# Patient Record
Sex: FEMALE | Race: BLACK OR AFRICAN AMERICAN | NOT HISPANIC OR LATINO | Employment: UNEMPLOYED | URBAN - METROPOLITAN AREA
[De-identification: names, ages, dates, MRNs, and addresses within clinical notes are randomized per-mention and may not be internally consistent; named-entity substitution may affect disease eponyms.]

---

## 2017-02-20 ENCOUNTER — COMMUNICATION - HEALTHEAST (OUTPATIENT)
Dept: CARDIOLOGY | Facility: CLINIC | Age: 47
End: 2017-02-20

## 2017-02-20 DIAGNOSIS — I10 HYPERTENSION: ICD-10-CM

## 2017-02-20 DIAGNOSIS — I10 UNSPECIFIED ESSENTIAL HYPERTENSION: ICD-10-CM

## 2017-04-11 ENCOUNTER — HOSPITAL ENCOUNTER (OUTPATIENT)
Dept: RADIOLOGY | Facility: HOSPITAL | Age: 47
Discharge: HOME | End: 2017-04-11
Attending: OBSTETRICS & GYNECOLOGY
Payer: COMMERCIAL

## 2017-04-11 DIAGNOSIS — Z12.31 VISIT FOR SCREENING MAMMOGRAM: ICD-10-CM

## 2017-04-11 PROCEDURE — 77067 SCR MAMMO BI INCL CAD: CPT | Mod: 26,,, | Performed by: RADIOLOGY

## 2017-04-11 PROCEDURE — 77067 SCR MAMMO BI INCL CAD: CPT | Mod: TC

## 2017-04-19 ENCOUNTER — COMMUNICATION - HEALTHEAST (OUTPATIENT)
Dept: CARDIOLOGY | Facility: CLINIC | Age: 47
End: 2017-04-19

## 2017-04-19 DIAGNOSIS — I10 UNSPECIFIED ESSENTIAL HYPERTENSION: ICD-10-CM

## 2017-04-19 DIAGNOSIS — I10 HYPERTENSION: ICD-10-CM

## 2017-04-26 ENCOUNTER — COMMUNICATION - HEALTHEAST (OUTPATIENT)
Dept: CARDIOLOGY | Facility: CLINIC | Age: 47
End: 2017-04-26

## 2017-05-18 ENCOUNTER — COMMUNICATION - HEALTHEAST (OUTPATIENT)
Dept: CARDIOLOGY | Facility: CLINIC | Age: 47
End: 2017-05-18

## 2017-05-18 DIAGNOSIS — I10 UNSPECIFIED ESSENTIAL HYPERTENSION: ICD-10-CM

## 2017-05-18 DIAGNOSIS — I10 HYPERTENSION: ICD-10-CM

## 2017-06-13 ENCOUNTER — COMMUNICATION - HEALTHEAST (OUTPATIENT)
Dept: CARDIOLOGY | Facility: CLINIC | Age: 47
End: 2017-06-13

## 2017-06-13 DIAGNOSIS — I10 HYPERTENSION: ICD-10-CM

## 2017-06-13 DIAGNOSIS — E87.6 HYPOKALEMIA: ICD-10-CM

## 2017-06-13 DIAGNOSIS — I10 UNSPECIFIED ESSENTIAL HYPERTENSION: ICD-10-CM

## 2017-07-25 ENCOUNTER — COMMUNICATION - HEALTHEAST (OUTPATIENT)
Dept: CARDIOLOGY | Facility: CLINIC | Age: 47
End: 2017-07-25

## 2017-07-25 DIAGNOSIS — I10 UNSPECIFIED ESSENTIAL HYPERTENSION: ICD-10-CM

## 2017-07-25 DIAGNOSIS — I10 HYPERTENSION: ICD-10-CM

## 2017-07-25 DIAGNOSIS — E87.6 HYPOKALEMIA: ICD-10-CM

## 2017-07-27 ENCOUNTER — OFFICE VISIT - HEALTHEAST (OUTPATIENT)
Dept: CARDIOLOGY | Facility: CLINIC | Age: 47
End: 2017-07-27

## 2017-07-27 DIAGNOSIS — I10 UNSPECIFIED ESSENTIAL HYPERTENSION: ICD-10-CM

## 2017-07-27 DIAGNOSIS — I10 ESSENTIAL HYPERTENSION: ICD-10-CM

## 2017-07-27 DIAGNOSIS — E87.6 HYPOKALEMIA: ICD-10-CM

## 2017-07-27 DIAGNOSIS — I10 HYPERTENSION: ICD-10-CM

## 2017-07-27 LAB
ATRIAL RATE - MUSE: 65 BPM
DIASTOLIC BLOOD PRESSURE - MUSE: NORMAL MMHG
INTERPRETATION ECG - MUSE: NORMAL
P AXIS - MUSE: 51 DEGREES
PR INTERVAL - MUSE: 166 MS
QRS DURATION - MUSE: 82 MS
QT - MUSE: 412 MS
QTC - MUSE: 428 MS
R AXIS - MUSE: 52 DEGREES
SYSTOLIC BLOOD PRESSURE - MUSE: NORMAL MMHG
T AXIS - MUSE: 41 DEGREES
VENTRICULAR RATE- MUSE: 65 BPM

## 2017-07-27 ASSESSMENT — MIFFLIN-ST. JEOR: SCORE: 1724.05

## 2018-03-27 ENCOUNTER — COMMUNICATION - HEALTHEAST (OUTPATIENT)
Dept: CARDIOLOGY | Facility: CLINIC | Age: 48
End: 2018-03-27

## 2018-03-28 DIAGNOSIS — Z12.39 SCREENING BREAST EXAMINATION: Primary | ICD-10-CM

## 2018-04-03 ENCOUNTER — OFFICE VISIT - HEALTHEAST (OUTPATIENT)
Dept: CARDIOLOGY | Facility: CLINIC | Age: 48
End: 2018-04-03

## 2018-04-03 DIAGNOSIS — I10 ESSENTIAL HYPERTENSION: ICD-10-CM

## 2018-04-03 DIAGNOSIS — E66.811 OBESITY (BMI 30.0-34.9): ICD-10-CM

## 2018-04-03 DIAGNOSIS — R07.89 OTHER CHEST PAIN: ICD-10-CM

## 2018-04-03 ASSESSMENT — MIFFLIN-ST. JEOR: SCORE: 1749.45

## 2018-04-16 ENCOUNTER — HOSPITAL ENCOUNTER (OUTPATIENT)
Dept: RADIOLOGY | Facility: HOSPITAL | Age: 48
Discharge: HOME | End: 2018-04-16
Attending: OBSTETRICS & GYNECOLOGY
Payer: COMMERCIAL

## 2018-04-16 ENCOUNTER — AMBULATORY - HEALTHEAST (OUTPATIENT)
Dept: CARDIOLOGY | Facility: CLINIC | Age: 48
End: 2018-04-16

## 2018-04-16 VITALS — BODY MASS INDEX: 25.71 KG/M2 | HEIGHT: 66 IN | WEIGHT: 160 LBS

## 2018-04-16 DIAGNOSIS — Z12.39 SCREENING BREAST EXAMINATION: ICD-10-CM

## 2018-04-16 DIAGNOSIS — R07.89 CHEST DISCOMFORT: ICD-10-CM

## 2018-04-16 DIAGNOSIS — I10 HTN (HYPERTENSION): ICD-10-CM

## 2018-04-16 PROCEDURE — 77063 BREAST TOMOSYNTHESIS BI: CPT | Mod: 26,,, | Performed by: RADIOLOGY

## 2018-04-16 PROCEDURE — 77067 SCR MAMMO BI INCL CAD: CPT | Mod: 26,,, | Performed by: RADIOLOGY

## 2018-04-16 PROCEDURE — 77067 SCR MAMMO BI INCL CAD: CPT | Mod: TC

## 2018-04-18 ENCOUNTER — COMMUNICATION - HEALTHEAST (OUTPATIENT)
Dept: ADMINISTRATIVE | Facility: CLINIC | Age: 48
End: 2018-04-18

## 2018-04-20 ENCOUNTER — COMMUNICATION - HEALTHEAST (OUTPATIENT)
Dept: CARDIOLOGY | Facility: CLINIC | Age: 48
End: 2018-04-20

## 2018-05-08 ENCOUNTER — COMMUNICATION - HEALTHEAST (OUTPATIENT)
Dept: CARDIOLOGY | Facility: CLINIC | Age: 48
End: 2018-05-08

## 2018-05-14 ENCOUNTER — COMMUNICATION - HEALTHEAST (OUTPATIENT)
Dept: CARDIOLOGY | Facility: CLINIC | Age: 48
End: 2018-05-14

## 2018-06-10 ENCOUNTER — OFFICE VISIT (OUTPATIENT)
Dept: URGENT CARE | Facility: URGENT CARE | Age: 48
End: 2018-06-10
Payer: COMMERCIAL

## 2018-06-10 VITALS
RESPIRATION RATE: 16 BRPM | WEIGHT: 220 LBS | HEIGHT: 69 IN | HEART RATE: 78 BPM | TEMPERATURE: 98.1 F | OXYGEN SATURATION: 99 % | BODY MASS INDEX: 32.58 KG/M2

## 2018-06-10 DIAGNOSIS — R07.9 ACUTE CHEST PAIN: Primary | ICD-10-CM

## 2018-06-10 DIAGNOSIS — I10 HYPERTENSION, UNSPECIFIED TYPE: ICD-10-CM

## 2018-06-10 PROCEDURE — 93000 ELECTROCARDIOGRAM COMPLETE: CPT | Performed by: PHYSICIAN ASSISTANT

## 2018-06-10 PROCEDURE — 99204 OFFICE O/P NEW MOD 45 MIN: CPT | Performed by: PHYSICIAN ASSISTANT

## 2018-06-10 RX ORDER — HYDROCHLOROTHIAZIDE 12.5 MG/1
12.5 CAPSULE ORAL DAILY
COMMUNITY
End: 2021-10-22

## 2018-06-10 NOTE — MR AVS SNAPSHOT
"              After Visit Summary   6/10/2018    Esmer Smith    MRN: [id number]           Patient Information     Date Of Birth          1970        Visit Information        Provider Department      6/10/2018 10:25 AM Tiffanie Bang PA-C [facility]        Today's Diagnoses     Acute chest pain    -  1    Hypertension, unspecified type           Follow-ups after your visit        Who to contact     If you have questions or need follow up information about today's clinic visit or your schedule please contact [facility] directly at [phone].  Normal or non-critical lab and imaging results will be communicated to you by MyChart, letter or phone within 4 business days after the clinic has received the results. If you do not hear from us within 7 days, please contact the clinic through MyChart or phone. If you have a critical or abnormal lab result, we will notify you by phone as soon as possible.  Submit refill requests through [employer] or call your pharmacy and they will forward the refill request to us. Please allow 3 business days for your refill to be completed.          Additional Information About Your Visit        Care EveryWhere ID     This is your Care EveryWhere ID. This could be used by other organizations to access your [place] medical records  [id number]        Your Vitals Were     Pulse Temperature Respirations Height Pulse Oximetry Breastfeeding?    78 98.1  F (36.7  C) (Oral) 16 5' 9\" (1.753 m) 99% No    BMI (Body Mass Index)                   32.49 kg/m2            Blood Pressure from Last 3 Encounters:   No data found for BP    Weight from Last 3 Encounters:   06/10/18 220 lb (99.8 kg)              We Performed the Following     EKG 12-lead complete w/read - Clinics        Primary Care Provider    None Specified       No primary provider on file.        Equal Access to Services     DIANE CERDA AH: Hadii navi Wynne " adriana bryantbenvirgilio jordanidalia joseph rosafred camargo. So [facility] [phone].    ATENCIÓN: Si meri bello, tiene a perez disposición servicios gratuitos de asistencia lingüística. Llame al [phone].    We comply with applicable federal civil rights laws and [place] laws. We do not discriminate on the basis of race, color, national origin, age, disability, sex, sexual orientation, or gender identity.            Thank you!     Thank you for choosing [facility]  for your care. Our goal is always to provide you with excellent care. Hearing back from our patients is one way we can continue to improve our services. Please take a few minutes to complete the written survey that you may receive in the mail after your visit with us. Thank you!             Your Updated Medication List - Protect others around you: Learn how to safely use, store and throw away your medicines at www.disposemymeds.org.          This list is accurate as of 6/10/18  1:23 PM.  Always use your most recent med list.                   Brand Name Dispense Instructions for use Diagnosis    hydrochlorothiazide 12.5 MG capsule    MICROZIDE     Take 12.5 mg by mouth daily        NORVASC PO           POTASSIUM CITRATE PO

## 2018-06-10 NOTE — PROGRESS NOTES
SUBJECTIVE:  Esmer Smith is a 47 year old female who presents to the office with the CC of chest pain.  Patient complains of right sided chest pain and right arm numbness.  The pain is characterized as severe burning located right chest with radiation to upper back. Symptoms began 2 hours prior to arrival and are worsening.   Pain is associated with diaphoresis, nausea, lightheadedness and feeling faint.  Pain is exacerbated by no known provoking events.  Pain is relieved by none.  Cardiac risk factors: negative for, abnormal lipids, diabetes mellitus, smoking  positive for HTN and FH      PAST MEDICAL HISTORY:   Past Medical History:   Diagnosis Date     Hypertension        PAST SURGICAL HISTORY:   Past Surgical History:   Procedure Laterality Date     GYN SURGERY         FAMILY HISTORY:   Family History   Problem Relation Age of Onset     CEREBROVASCULAR DISEASE Maternal Grandmother        SOCIAL HISTORY:   Social History   Substance Use Topics     Smoking status: Never Smoker     Smokeless tobacco: Never Used     Alcohol use Not on file         Current Outpatient Prescriptions:      AmLODIPine Besylate (NORVASC PO), , Disp: , Rfl:      hydrochlorothiazide (MICROZIDE) 12.5 MG capsule, Take 12.5 mg by mouth daily, Disp: , Rfl:      POTASSIUM CITRATE PO, , Disp: , Rfl:     Social History   Substance Use Topics     Smoking status: Never Smoker     Smokeless tobacco: Never Used     Alcohol use Not on file     ROS:  C: NEGATIVE for fever, chills, change in weight  INTEGUMENTARY/SKIN: NEGATIVE for worrisome rashes, moles or lesions  E/M: NEGATIVE for sore throat, ear or sinus problems  R: NEGATIVE for cough  CV: POSITIVE for CP  GI: POSITIVE for nausea  : NEGATIVE for dysuria, hematuria, decreased urinary stream or discharge  MUSCULOSKELETAL: POSITIVE for arm pain  NEURO: POSITIVE for weakness, dizziness  ENDOCRINE: NEGATIVE for cold intolerance, HX diabetes and HX thyroid disease  HEME/ALLERGY/IMMUNE: NEGATIVE for  "swollen nodes      EXAM:  Pulse 78  Temp 98.1  F (36.7  C) (Oral)  Resp 16  Ht 5' 9\" (1.753 m)  Wt 220 lb (99.8 kg)  SpO2 99%  Breastfeeding? No  BMI 32.49 kg/m2  GENERAL APPEARANCE: healthy, alert and distressed  EYES: EOMI,  PERRL, conjunctiva clear  RESP: lungs clear to auscultation - no rales, rhonchi or wheezes  CV: regular rates and rhythm, normal S1 S2, no murmur noted  ABDOMEN:  soft, nontender, no HSM or masses and bowel sounds normal  NEURO: Normal strength and tone, sensory exam grossly normal,  normal speech and mentation  SKIN: no suspicious lesions or rashes  PSYCH: mentation appears normal, anxious and crying     Office EKG demonstrates:  appears normal, NSR,normal axis, normal intervals, no acute ST/T changes c/w ischemia,no LVH by voltage criteria,unchanged from previous tracings    Assessment / PLAN:  1. Chest pain  Patient is anxious in office, crying and stating that she cant move her arms.   Unable to obtain BP, EMS called for transport to ED for evaluation.   - EKG 12-lead complete w/read - Clinics    Tiffanie Bang PA-C    "

## 2018-06-15 ENCOUNTER — HOSPITAL ENCOUNTER (OUTPATIENT)
Dept: CARDIOLOGY | Facility: CLINIC | Age: 48
Discharge: HOME | End: 2018-06-15
Attending: INTERNAL MEDICINE

## 2018-06-15 DIAGNOSIS — R07.89 CHEST DISCOMFORT: ICD-10-CM

## 2018-06-15 DIAGNOSIS — I10 HTN (HYPERTENSION): ICD-10-CM

## 2018-06-15 LAB
CV STRESS CURRENT BP HE: NORMAL
CV STRESS CURRENT HR HE: 100
CV STRESS CURRENT HR HE: 105
CV STRESS CURRENT HR HE: 106
CV STRESS CURRENT HR HE: 116
CV STRESS CURRENT HR HE: 122
CV STRESS CURRENT HR HE: 124
CV STRESS CURRENT HR HE: 125
CV STRESS CURRENT HR HE: 127
CV STRESS CURRENT HR HE: 140
CV STRESS CURRENT HR HE: 140
CV STRESS CURRENT HR HE: 147
CV STRESS CURRENT HR HE: 149
CV STRESS CURRENT HR HE: 150
CV STRESS CURRENT HR HE: 154
CV STRESS CURRENT HR HE: 163
CV STRESS CURRENT HR HE: 166
CV STRESS CURRENT HR HE: 167
CV STRESS CURRENT HR HE: 167
CV STRESS CURRENT HR HE: 75
CV STRESS CURRENT HR HE: 87
CV STRESS CURRENT HR HE: 88
CV STRESS CURRENT HR HE: 88
CV STRESS CURRENT HR HE: 92
CV STRESS CURRENT HR HE: 93
CV STRESS DEVIATION TIME HE: NORMAL
CV STRESS ECHO PERCENT HR HE: NORMAL
CV STRESS EXERCISE STAGE HE: NORMAL
CV STRESS FINAL RESTING BP HE: NORMAL
CV STRESS FINAL RESTING HR HE: 92
CV STRESS MAX HR HE: 167
CV STRESS MAX TREADMILL GRADE HE: 14
CV STRESS MAX TREADMILL SPEED HE: 3.4
CV STRESS PEAK DIA BP HE: NORMAL
CV STRESS PEAK SYS BP HE: NORMAL
CV STRESS PHASE HE: NORMAL
CV STRESS PROTOCOL HE: NORMAL
CV STRESS RESTING PT POSITION HE: NORMAL
CV STRESS RESTING PT POSITION HE: NORMAL
CV STRESS ST DEVIATION AMOUNT HE: NORMAL
CV STRESS ST DEVIATION ELEVATION HE: NORMAL
CV STRESS ST EVELATION AMOUNT HE: NORMAL
CV STRESS TEST TYPE HE: NORMAL
CV STRESS TOTAL STAGE TIME MIN 1 HE: NORMAL
ECHO EJECTION FRACTION ESTIMATED: 60 %
STRESS ECHO BASELINE BP: NORMAL
STRESS ECHO BASELINE HR: 84
STRESS ECHO CALCULATED PERCENT HR: 95 %
STRESS ECHO LAST STRESS BP: NORMAL
STRESS ECHO LAST STRESS HR: 167
STRESS ECHO POST ESTIMATED WORKLOAD: 9.3
STRESS ECHO POST EXERCISE DUR MIN: 7
STRESS ECHO POST EXERCISE DUR SEC: 43
STRESS ECHO TARGET HR: 149

## 2018-06-20 ENCOUNTER — OFFICE VISIT - HEALTHEAST (OUTPATIENT)
Dept: CARDIOLOGY | Facility: CLINIC | Age: 48
End: 2018-06-20

## 2018-06-20 DIAGNOSIS — I10 ESSENTIAL HYPERTENSION: ICD-10-CM

## 2018-06-20 ASSESSMENT — MIFFLIN-ST. JEOR: SCORE: 1746.73

## 2018-09-10 ENCOUNTER — COMMUNICATION - HEALTHEAST (OUTPATIENT)
Dept: CARDIOLOGY | Facility: CLINIC | Age: 48
End: 2018-09-10

## 2018-09-18 ENCOUNTER — COMMUNICATION - HEALTHEAST (OUTPATIENT)
Dept: CARDIOLOGY | Facility: CLINIC | Age: 48
End: 2018-09-18

## 2018-09-18 DIAGNOSIS — I10 ESSENTIAL HYPERTENSION: ICD-10-CM

## 2018-09-18 DIAGNOSIS — E87.6 HYPOKALEMIA: ICD-10-CM

## 2018-09-25 ENCOUNTER — RECORDS - HEALTHEAST (OUTPATIENT)
Dept: LAB | Facility: CLINIC | Age: 48
End: 2018-09-25

## 2018-09-25 LAB
ANION GAP SERPL CALCULATED.3IONS-SCNC: 5 MMOL/L (ref 5–18)
BUN SERPL-MCNC: 9 MG/DL (ref 8–22)
CALCIUM SERPL-MCNC: 9 MG/DL (ref 8.5–10.5)
CHLORIDE BLD-SCNC: 108 MMOL/L (ref 98–107)
CHOLEST SERPL-MCNC: 193 MG/DL
CO2 SERPL-SCNC: 26 MMOL/L (ref 22–31)
CREAT SERPL-MCNC: 0.67 MG/DL (ref 0.6–1.1)
FASTING STATUS PATIENT QL REPORTED: YES
FERRITIN SERPL-MCNC: 50 NG/ML (ref 10–130)
GFR SERPL CREATININE-BSD FRML MDRD: >60 ML/MIN/1.73M2
GLUCOSE BLD-MCNC: 95 MG/DL (ref 70–125)
HDLC SERPL-MCNC: 53 MG/DL
LDLC SERPL CALC-MCNC: 129 MG/DL
MAGNESIUM SERPL-MCNC: 2 MG/DL (ref 1.8–2.6)
POTASSIUM BLD-SCNC: 3.9 MMOL/L (ref 3.5–5)
SODIUM SERPL-SCNC: 139 MMOL/L (ref 136–145)
TRIGL SERPL-MCNC: 54 MG/DL
TSH SERPL DL<=0.005 MIU/L-ACNC: 3.31 UIU/ML (ref 0.3–5)

## 2019-04-01 DIAGNOSIS — Z12.31 VISIT FOR SCREENING MAMMOGRAM: Primary | ICD-10-CM

## 2019-04-23 ENCOUNTER — HOSPITAL ENCOUNTER (OUTPATIENT)
Dept: RADIOLOGY | Facility: HOSPITAL | Age: 49
Discharge: HOME | End: 2019-04-23
Attending: OBSTETRICS & GYNECOLOGY
Payer: COMMERCIAL

## 2019-04-23 VITALS — HEIGHT: 66 IN | WEIGHT: 160 LBS | BODY MASS INDEX: 25.71 KG/M2

## 2019-04-23 DIAGNOSIS — Z12.31 VISIT FOR SCREENING MAMMOGRAM: ICD-10-CM

## 2019-04-23 PROCEDURE — 77063 BREAST TOMOSYNTHESIS BI: CPT | Mod: 26,,, | Performed by: RADIOLOGY

## 2019-04-23 PROCEDURE — 77067 MAMMO DIGITAL SCREENING BILAT WITH TOMOSYNTHESIS_CAD: ICD-10-PCS | Mod: 26,,, | Performed by: RADIOLOGY

## 2019-04-23 PROCEDURE — 77063 MAMMO DIGITAL SCREENING BILAT WITH TOMOSYNTHESIS_CAD: ICD-10-PCS | Mod: 26,,, | Performed by: RADIOLOGY

## 2019-04-23 PROCEDURE — 77067 SCR MAMMO BI INCL CAD: CPT | Mod: 26,,, | Performed by: RADIOLOGY

## 2019-04-23 PROCEDURE — 77067 SCR MAMMO BI INCL CAD: CPT | Mod: TC

## 2019-06-15 ENCOUNTER — COMMUNICATION - HEALTHEAST (OUTPATIENT)
Dept: CARDIOLOGY | Facility: CLINIC | Age: 49
End: 2019-06-15

## 2019-06-15 DIAGNOSIS — E87.6 HYPOKALEMIA: ICD-10-CM

## 2019-06-15 DIAGNOSIS — I10 ESSENTIAL HYPERTENSION: ICD-10-CM

## 2019-06-28 ENCOUNTER — RECORDS - HEALTHEAST (OUTPATIENT)
Dept: LAB | Facility: CLINIC | Age: 49
End: 2019-06-28

## 2019-06-28 LAB — TSH SERPL DL<=0.005 MIU/L-ACNC: 3.61 UIU/ML (ref 0.3–5)

## 2019-07-02 LAB — THYROID PEROXIDASE ANTIBODIES - HISTORICAL: 148.1 IU/ML (ref 0–5.6)

## 2019-07-03 ENCOUNTER — COMMUNICATION - HEALTHEAST (OUTPATIENT)
Dept: CARDIOLOGY | Facility: CLINIC | Age: 49
End: 2019-07-03

## 2019-07-03 DIAGNOSIS — I10 ESSENTIAL HYPERTENSION: ICD-10-CM

## 2019-07-16 ENCOUNTER — RECORDS - HEALTHEAST (OUTPATIENT)
Dept: ADMINISTRATIVE | Facility: OTHER | Age: 49
End: 2019-07-16

## 2019-07-16 ENCOUNTER — AMBULATORY - HEALTHEAST (OUTPATIENT)
Dept: CARDIOLOGY | Facility: CLINIC | Age: 49
End: 2019-07-16

## 2019-07-17 ENCOUNTER — AMBULATORY - HEALTHEAST (OUTPATIENT)
Dept: CARDIOLOGY | Facility: CLINIC | Age: 49
End: 2019-07-17

## 2019-07-26 ENCOUNTER — OFFICE VISIT - HEALTHEAST (OUTPATIENT)
Dept: CARDIOLOGY | Facility: CLINIC | Age: 49
End: 2019-07-26

## 2019-07-26 DIAGNOSIS — I10 ESSENTIAL HYPERTENSION: ICD-10-CM

## 2019-07-26 DIAGNOSIS — R07.89 ATYPICAL CHEST PAIN: ICD-10-CM

## 2019-07-26 ASSESSMENT — MIFFLIN-ST. JEOR: SCORE: 1784.81

## 2019-12-30 ENCOUNTER — COMMUNICATION - HEALTHEAST (OUTPATIENT)
Dept: CARDIOLOGY | Facility: CLINIC | Age: 49
End: 2019-12-30

## 2019-12-30 DIAGNOSIS — I10 ESSENTIAL HYPERTENSION: ICD-10-CM

## 2019-12-31 ENCOUNTER — COMMUNICATION - HEALTHEAST (OUTPATIENT)
Dept: CARDIOLOGY | Facility: CLINIC | Age: 49
End: 2019-12-31

## 2019-12-31 DIAGNOSIS — E87.6 HYPOKALEMIA: ICD-10-CM

## 2020-03-02 ENCOUNTER — COMMUNICATION - HEALTHEAST (OUTPATIENT)
Dept: CARDIOLOGY | Facility: CLINIC | Age: 50
End: 2020-03-02

## 2020-03-02 DIAGNOSIS — I10 ESSENTIAL HYPERTENSION: ICD-10-CM

## 2020-04-08 ENCOUNTER — COMMUNICATION - HEALTHEAST (OUTPATIENT)
Dept: CARDIOLOGY | Facility: CLINIC | Age: 50
End: 2020-04-08

## 2020-04-15 ENCOUNTER — OFFICE VISIT - HEALTHEAST (OUTPATIENT)
Dept: CARDIOLOGY | Facility: CLINIC | Age: 50
End: 2020-04-15

## 2020-04-15 DIAGNOSIS — I10 ESSENTIAL HYPERTENSION: ICD-10-CM

## 2020-04-15 DIAGNOSIS — R07.89 OTHER CHEST PAIN: ICD-10-CM

## 2020-04-15 DIAGNOSIS — I10 HTN (HYPERTENSION): ICD-10-CM

## 2020-04-16 ENCOUNTER — COMMUNICATION - HEALTHEAST (OUTPATIENT)
Dept: CARDIOLOGY | Facility: CLINIC | Age: 50
End: 2020-04-16

## 2020-06-19 ENCOUNTER — COMMUNICATION - HEALTHEAST (OUTPATIENT)
Dept: CARDIOLOGY | Facility: CLINIC | Age: 50
End: 2020-06-19

## 2020-06-19 DIAGNOSIS — I10 ESSENTIAL HYPERTENSION: ICD-10-CM

## 2020-06-19 DIAGNOSIS — E87.6 HYPOKALEMIA: ICD-10-CM

## 2020-06-23 ENCOUNTER — COMMUNICATION - HEALTHEAST (OUTPATIENT)
Dept: CARDIOLOGY | Facility: CLINIC | Age: 50
End: 2020-06-23

## 2020-06-23 DIAGNOSIS — I10 ESSENTIAL HYPERTENSION: ICD-10-CM

## 2020-12-01 ENCOUNTER — OFFICE VISIT (OUTPATIENT)
Dept: INTERNAL MEDICINE | Facility: CLINIC | Age: 50
End: 2020-12-01
Payer: COMMERCIAL

## 2020-12-01 VITALS
OXYGEN SATURATION: 99 % | DIASTOLIC BLOOD PRESSURE: 70 MMHG | HEIGHT: 66 IN | WEIGHT: 180.44 LBS | HEART RATE: 73 BPM | BODY MASS INDEX: 29 KG/M2 | SYSTOLIC BLOOD PRESSURE: 132 MMHG

## 2020-12-01 DIAGNOSIS — Z00.00 ROUTINE GENERAL MEDICAL EXAMINATION AT A HEALTH CARE FACILITY: ICD-10-CM

## 2020-12-01 DIAGNOSIS — I51.7 LVH (LEFT VENTRICULAR HYPERTROPHY): ICD-10-CM

## 2020-12-01 DIAGNOSIS — I10 ESSENTIAL HYPERTENSION: Primary | ICD-10-CM

## 2020-12-01 LAB
BILIRUB SERPL-MCNC: ABNORMAL MG/DL
BLOOD URINE, POC: ABNORMAL
CLARITY, POC UA: ABNORMAL
COLOR, POC UA: ABNORMAL
GLUCOSE UR QL STRIP: ABNORMAL
KETONES UR QL STRIP: ABNORMAL
LEUKOCYTE ESTERASE URINE, POC: ABNORMAL
NITRITE, POC UA: ABNORMAL
PH, POC UA: ABNORMAL
PROTEIN, POC: ABNORMAL
SPECIFIC GRAVITY, POC UA: ABNORMAL
UROBILINOGEN, POC UA: ABNORMAL

## 2020-12-01 PROCEDURE — 90471 FLU VACCINE (QUAD) GREATER THAN OR EQUAL TO 3YO PRESERVATIVE FREE IM: ICD-10-PCS | Mod: S$GLB,,, | Performed by: INTERNAL MEDICINE

## 2020-12-01 PROCEDURE — 90471 IMMUNIZATION ADMIN: CPT | Mod: S$GLB,,, | Performed by: INTERNAL MEDICINE

## 2020-12-01 PROCEDURE — 93010 EKG 12-LEAD: ICD-10-PCS | Mod: S$GLB,,, | Performed by: INTERNAL MEDICINE

## 2020-12-01 PROCEDURE — 99204 OFFICE O/P NEW MOD 45 MIN: CPT | Mod: 25,S$GLB,, | Performed by: INTERNAL MEDICINE

## 2020-12-01 PROCEDURE — 3008F BODY MASS INDEX DOCD: CPT | Mod: CPTII,S$GLB,, | Performed by: INTERNAL MEDICINE

## 2020-12-01 PROCEDURE — 3008F PR BODY MASS INDEX (BMI) DOCUMENTED: ICD-10-PCS | Mod: CPTII,S$GLB,, | Performed by: INTERNAL MEDICINE

## 2020-12-01 PROCEDURE — 90686 IIV4 VACC NO PRSV 0.5 ML IM: CPT | Mod: S$GLB,,, | Performed by: INTERNAL MEDICINE

## 2020-12-01 PROCEDURE — 81002 URINALYSIS NONAUTO W/O SCOPE: CPT | Mod: S$GLB,,, | Performed by: INTERNAL MEDICINE

## 2020-12-01 PROCEDURE — 99204 PR OFFICE/OUTPT VISIT, NEW, LEVL IV, 45-59 MIN: ICD-10-PCS | Mod: 25,S$GLB,, | Performed by: INTERNAL MEDICINE

## 2020-12-01 PROCEDURE — 99999 PR PBB SHADOW E&M-EST. PATIENT-LVL III: ICD-10-PCS | Mod: PBBFAC,,, | Performed by: INTERNAL MEDICINE

## 2020-12-01 PROCEDURE — 93010 ELECTROCARDIOGRAM REPORT: CPT | Mod: S$GLB,,, | Performed by: INTERNAL MEDICINE

## 2020-12-01 PROCEDURE — 93005 ELECTROCARDIOGRAM TRACING: CPT | Mod: S$GLB,,, | Performed by: INTERNAL MEDICINE

## 2020-12-01 PROCEDURE — 93005 EKG 12-LEAD: ICD-10-PCS | Mod: S$GLB,,, | Performed by: INTERNAL MEDICINE

## 2020-12-01 PROCEDURE — 1126F PR PAIN SEVERITY QUANTIFIED, NO PAIN PRESENT: ICD-10-PCS | Mod: S$GLB,,, | Performed by: INTERNAL MEDICINE

## 2020-12-01 PROCEDURE — 90472 IMMUNIZATION ADMIN EACH ADD: CPT | Mod: S$GLB,,, | Performed by: INTERNAL MEDICINE

## 2020-12-01 PROCEDURE — 90472 TDAP VACCINE GREATER THAN OR EQUAL TO 7YO IM: ICD-10-PCS | Mod: S$GLB,,, | Performed by: INTERNAL MEDICINE

## 2020-12-01 PROCEDURE — 81002 POCT URINE DIPSTICK WITHOUT MICROSCOPE: ICD-10-PCS | Mod: S$GLB,,, | Performed by: INTERNAL MEDICINE

## 2020-12-01 PROCEDURE — 90715 TDAP VACCINE 7 YRS/> IM: CPT | Mod: S$GLB,,, | Performed by: INTERNAL MEDICINE

## 2020-12-01 PROCEDURE — 90715 TDAP VACCINE GREATER THAN OR EQUAL TO 7YO IM: ICD-10-PCS | Mod: S$GLB,,, | Performed by: INTERNAL MEDICINE

## 2020-12-01 PROCEDURE — 99999 PR PBB SHADOW E&M-EST. PATIENT-LVL III: CPT | Mod: PBBFAC,,, | Performed by: INTERNAL MEDICINE

## 2020-12-01 PROCEDURE — 90686 FLU VACCINE (QUAD) GREATER THAN OR EQUAL TO 3YO PRESERVATIVE FREE IM: ICD-10-PCS | Mod: S$GLB,,, | Performed by: INTERNAL MEDICINE

## 2020-12-01 PROCEDURE — 1126F AMNT PAIN NOTED NONE PRSNT: CPT | Mod: S$GLB,,, | Performed by: INTERNAL MEDICINE

## 2020-12-01 RX ORDER — LOSARTAN POTASSIUM 100 MG/1
100 TABLET ORAL DAILY
Qty: 90 TABLET | Refills: 3 | Status: SHIPPED | OUTPATIENT
Start: 2020-12-01 | End: 2021-05-27 | Stop reason: SDUPTHER

## 2020-12-01 RX ORDER — ACETAMINOPHEN 500 MG
1 TABLET ORAL DAILY
Qty: 1 EACH | Refills: 0 | Status: SHIPPED | OUTPATIENT
Start: 2020-12-01 | End: 2021-05-30

## 2020-12-01 RX ORDER — MEDROXYPROGESTERONE ACETATE 150 MG/ML
INJECTION, SUSPENSION INTRAMUSCULAR
COMMUNITY
Start: 2020-11-24 | End: 2022-07-14 | Stop reason: SDUPTHER

## 2020-12-01 NOTE — PATIENT INSTRUCTIONS
Eating Heart-Healthy Foods  Eating has a big impact on your heart health. In fact, eating healthier can improve several of your heart risks at once. For instance, it helps you manage weight, cholesterol, and blood pressure. Here are ideas to help you make heart-healthy changes without giving up all the foods and flavors you love.  Getting started  · Talk with your health care provider about eating plans, such as the DASH or Mediterranean diet. You may also be referred to a dietitian.  · Change a few things at a time. Give yourself time to get used to a few eating changes before adding more.  · Work to create a tasty, healthy eating plan that you can stick to for the rest of your life.    Goals for healthy eating  Below are some tips to improve your eating habits:  · Limit saturated fats and trans fats. Saturated fats raise your levels of cholesterol, so keep these fats to a minimum. They are found in foods such as fatty meats, whole milk, cheese, and palm and coconut oils. Avoid trans fats because they lower good cholesterol as well as raise bad cholesterol. Trans fats are most often found in processed foods.  · Reduce sodium (salt) intake. Eating too much salt may increase your blood pressure. Limit your sodium intake to 2,300 milligrams (mg) per day, or less if your health care provider recommends it. Dining out less often and eating fewer processed foods are two great ways to decrease the amount of salt you consume.  · Managing calories. A calorie is a unit of energy. Your body burns calories for fuel, but if you eat more calories than your body burns, the extras are stored as fat. Your health care provider can help you create a diet plan to manage your calories. This will likely include eating healthier foods as well as exercising regularly. To help you track your progress, keep a diary to record what you eat and how often you exercise.  Choose the right foods  Aim to make these foods staples of your diet. If  you have diabetes, you may have different recommendations than what is listed here:  · Fruits and vegetable provide plenty of nutrients without a lot of calories. At meals, fill half your plate with these foods. Split the other half of your plate between whole grains and lean protein.  · Whole grains are high in fiber and rich in vitamins and nutrients. Good choices include whole-wheat bread, pasta, and brown rice.  · Lean proteins give you nutrition with less fat. Good choices include fish, skinless chicken, and beans.  · Low-fat or nonfat dairy provides nutrients without a lot of fat. Try low-fat or nonfat milk, cheese, or yogurt.  · Healthy fats can be good for you in small amounts. These are unsaturated fats, such as olive oil, nuts, and fish. Try to have at least 2 servings per week of fatty fish such as salmon, sardines, mackerel, rainbow trout, and albacore tuna. These contain omega-3 fatty acids, which are good for your heart. Flaxseed is another source of a heart-healthy fat.  More on heart healthy eating    Read food labels  Healthy eating starts at the grocery store. Be sure to pay attention to food labels on packaged foods. Look for products that are high in fiber and protein, and low in saturated fat, cholesterol, and sodium. Avoid products that contain trans fat. And pay close attention to serving size. For instance, if you plan to eat two servings, double all the numbers on the label.  Prepare food right  A key part of healthy cooking is cutting down on added fat and salt. Look on the internet for lower-fat, lower-sodium recipes. Also, try these tips:  · Remove fat from meat and skin from poultry before cooking.  · Skim fat from the surface of soups and sauces.  · Broil, boil, bake, steam, grill, and microwave food without added fats.  · Choose ingredients that spice up your food without adding calories, fat, or sodium. Try these items: horseradish, hot sauce, lemon, mustard, nonfat salad dressings,  and vinegar. For salt-free herbs and spices, try basil, cilantro, cinnamon, pepper, and rosemary.  Date Last Reviewed: 6/25/2015  © 2221-5526 [employer]. [address] [place] [address], [place] [address]. All rights reserved. This information is not intended as a substitute for professional medical care. Always follow your healthcare professional's instructions.        Exercise for a Healthier Heart  You may wonder how you can improve the health of your heart. If youre thinking about exercise, youre on the right track. You dont need to become an athlete, but you do need a certain amount of brisk exercise to help strengthen your heart. If you have been diagnosed with a heart condition, your doctor may recommend exercise to help stabilize your condition. To help make exercise a habit, choose safe, fun activities.     Exercise with a friend. When activity is fun, you're more likely to stick with it.     Be sure to check with your healthcare provider before starting an exercise program.   Why exercise?  Exercising regularly offers many healthy rewards. It can help you do all of the following:  · Improve your blood cholesterol level to help prevent further heart trouble  · Lower your blood pressure to help prevent a stroke or heart attack  · Control diabetes, or reduce your risk of getting this disease  · Improve your heart and lung function  · Reach and maintain a healthy weight  · Make your muscles stronger and more limber so you can stay active  · Prevent falls and fractures by slowing the loss of bone mass (osteoporosis)  · Manage stress better  · Reduce your blood pressure  · Improve your sense of self and your body image  Exercise tips  Ease into your routine. Set small goals. Then build on them.  Exercise on most days. Aim for a total of 150 or more minutes of moderate to  vigorous intensity activity each week. Consider 40 minutes, 3 to 4 times a week. For best results, activity should last for 40  minutes on average. It is OK to work up to the 40 minute period over time. Examples of moderate-intensity activity is walking 1 mile in 15 minutes or 30 to 45 minutes of yard work.  Step up your daily activity level. Along with your exercise program, try being more active throughout the day. Walk instead of drive. Do more household tasks or yard work.  Choose one or more activities you enjoy. Walking is one of the easiest things you can do. You can also try swimming, riding a bike, dancing, or taking an exercise class.  Stop exercising and call your doctor if you:  · Have chest pain or feel dizzy or lightheaded  · Feel burning, tightness, pressure, or heaviness in your chest, neck, shoulders, back, or arms  · Have unusual shortness of breath  · Have increased joint or muscle pain  · Have palpitations or an irregular heartbeat   Date Last Reviewed: 5/1/2016 © 2000-2017 [employer]. [address] [place] [address]. All rights reserved. This information is not intended as a substitute for professional medical care. Always follow your healthcare professional's instructions.        Taking Your Blood Pressure  Blood pressure is the force of blood against the artery wall as it moves from the heart through the blood vessels. You can take your own blood pressure reading using a digital monitor. Take your readings the same each time, using the same arm. Take readings as often as your healthcare provider instructs.  About blood pressure monitors  Blood pressure monitors are designed for certain ages and cases. You can find monitors for older adults, for pregnant women, and for children. Make sure the one you choose is the right one for your age and situation.  The American Heart Association recommends an automatic cuff monitor that fits on your upper arm (bicep). The cuff should fit your arm size. A cuff thats too large or too small will not give an accurate reading. Measure around your upper arm to  find your size.  Monitors that attach to your finger or wrist are not as accurate as monitors for your upper arm.  Ask your healthcare provider for help in choosing a monitor. Bring your monitor to your next provider visit if you need help in using it the correct way.  The steps below are general instructions for using an automatic digital monitor.  Step 1. Relax    · Take your blood pressure at the same time every day, such as in the morning or evening, or at the time your healthcare provider recommends.  · Wait at least a half-hour after smoking, eating, or exercising. Don't drink coffee, tea, soda, or other caffeinated beverages before checking your blood pressure.  · Sit comfortably at a table with both feet on the floor. Do not cross your legs or feet. Place the monitor near you.  · Rest for a few minutes before you begin.  Step 2. Wrap the cuff    · Place your arm on the table, palm up. Your arm should be at the level of your heart. Wrap the cuff around your upper arm, just above your elbow. Its best done on bare skin, not over clothing. Most cuffs will indicate where the brachial artery (the blood vessel in the middle of the arm at the inner side of the elbow) should line up with the cuff. Look in your monitor's instruction booklet for an illustration. You can also bring your cuff to your healthcare provider and have them show you how to correctly place the cuff.  Step 3. Inflate the cuff    · Push the button that starts the pump.  · The cuff will tighten, then loosen.  · The numbers will change. When they stop changing, your blood pressure reading will appear.  · Take 2 or 3 readings one minute apart.  Step 4. Write down the results of each reading    · Write down your blood pressure numbers for each reading. Note the date and time. Keep your results in one place, such as a notebook. Even if your monitor has a built-in memory, keep a hard copy of the readings.  · Remove the cuff from your arm. Turn off the  machine.  · Bring your blood pressure records with your healthcare providers at each visit.  · If you start a new blood pressure medicine, note the day you started the new medicine. Also note the day if you change the dose of your medicine. This information goes on your blood pressure recording sheet. This will help your healthcare provider monitor how well the medicine changes are working.  · Ask your healthcare provider what numbers should prompt you to call him or her. Also ask what numbers should prompt you to get help right away.  Date Last Reviewed: 11/1/2016 © 2000-2017 [employer]. [address] [place] [address]. All rights reserved. This information is not intended as a substitute for professional medical care. Always follow your healthcare professional's instructions.

## 2020-12-01 NOTE — PROGRESS NOTES
Subjective:       Patient ID: Anna Oakley is a 50 y.o. female.    Chief Complaint: Hypertension (labs needed) and Headache    HPI  Pt establishing care.  Pt started awakening with HA, blurry vision 2 weeks ago and started having her mother check her BPs in AM which were running in 170s/90s range.  No CP, SOB but has sl SALAS climbing steps.  Had a heart murmur as a child..  FH of HTN.  Pt stopped walking and has gained > 20 lbs over past year. Only recently started avoiding Na.  No cigs.  Light EtOH use.  U/A with 1 + protein, EKG with LAD. LAE, question of AMI.  Review of Systems   All other systems reviewed and are negative.      Objective:      Physical Exam  Vitals signs reviewed.   Constitutional:       General: She is not in acute distress.     Appearance: She is well-developed.   HENT:      Head: Normocephalic.      Mouth/Throat:      Mouth: Mucous membranes are moist.   Eyes:      General: No scleral icterus.     Extraocular Movements: Extraocular movements intact.      Conjunctiva/sclera: Conjunctivae normal.   Neck:      Musculoskeletal: Normal range of motion.   Cardiovascular:      Rate and Rhythm: Normal rate and regular rhythm.      Pulses: Normal pulses.      Heart sounds: Normal heart sounds.   Pulmonary:      Effort: Pulmonary effort is normal.      Breath sounds: Normal breath sounds.   Abdominal:      General: Bowel sounds are normal. There is no distension.      Palpations: Abdomen is soft. There is mass.   Musculoskeletal: Normal range of motion.      Right lower leg: No edema.      Left lower leg: No edema.   Lymphadenopathy:      Cervical: No cervical adenopathy.   Skin:     General: Skin is warm and dry.   Neurological:      General: No focal deficit present.      Mental Status: She is alert.      Cranial Nerves: No cranial nerve deficit.      Motor: No abnormal muscle tone.      Coordination: Coordination normal.   Psychiatric:         Mood and Affect: Mood normal.         Behavior:  Behavior normal.         Assessment:       1. Essential hypertension    2. LVH (left ventricular hypertrophy)    3. Routine general medical examination at a health care facility        Plan:       Anna was seen today for hypertension and headache.    Diagnoses and all orders for this visit:    Essential hypertension  -     POCT urine dipstick without microscope  -     EKG 12-lead  -     blood pressure test kit-large Kit; 1 Units by Misc.(Non-Drug; Combo Route) route once daily.  -     losartan (COZAAR) 100 MG tablet; Take 1 tablet (100 mg total) by mouth once daily.   Low na diet   Walk qd    LVH (left ventricular hypertrophy)  -     CBC Auto Differential; Future  -     Comprehensive Metabolic Panel; Future  -     TSH; Future  -     Echo Color Flow Doppler? Yes  -     losartan (COZAAR) 100 MG tablet; Take 1 tablet (100 mg total) by mouth once daily.    Routine general medical examination at a health care facility  -     Influenza - Quadrivalent (PF)  -     Tdap Vaccine  -     Lipid Panel; Future      Follow up in about 2 weeks (around 12/15/2020).

## 2020-12-02 ENCOUNTER — TELEPHONE (OUTPATIENT)
Dept: INTERNAL MEDICINE | Facility: CLINIC | Age: 50
End: 2020-12-02

## 2020-12-07 ENCOUNTER — HOSPITAL ENCOUNTER (OUTPATIENT)
Dept: CARDIOLOGY | Facility: HOSPITAL | Age: 50
Discharge: HOME | End: 2020-12-07
Attending: INTERNAL MEDICINE
Payer: COMMERCIAL

## 2020-12-07 VITALS — HEIGHT: 66 IN | WEIGHT: 180 LBS | BODY MASS INDEX: 28.93 KG/M2

## 2020-12-07 LAB
AORTIC ROOT ANNULUS: 2.09 CM
AORTIC VALVE CUSP SEPERATION: 1.64 CM
AV INDEX (PROSTH): 0.78
AV MEAN GRADIENT: 6 MMHG
AV PEAK GRADIENT: 9 MMHG
AV VALVE AREA: 2.32 CM2
AV VELOCITY RATIO: 0.84
BSA FOR ECHO PROCEDURE: 1.95 M2
CV ECHO LV RWT: 0.44 CM
DOP CALC AO PEAK VEL: 1.5 M/S
DOP CALC AO VTI: 35 CM
DOP CALC LVOT AREA: 3 CM2
DOP CALC LVOT DIAMETER: 1.95 CM
DOP CALC LVOT PEAK VEL: 1.26 M/S
DOP CALC LVOT STROKE VOLUME: 81.16 CM3
DOP CALCLVOT PEAK VEL VTI: 27.19 CM
E WAVE DECELERATION TIME: 153.59 MSEC
E/A RATIO: 1.52
E/E' RATIO: 7.38 M/S
ECHO LV POSTERIOR WALL: 0.9 CM (ref 0.6–1.1)
FRACTIONAL SHORTENING: 28 % (ref 28–44)
INTERVENTRICULAR SEPTUM: 0.7 CM (ref 0.6–1.1)
IVC PROX: 0.8 CM
LA MAJOR: 4.4 CM
LA MINOR: 4.48 CM
LA WIDTH: 3.29 CM
LEFT ATRIUM SIZE: 3.24 CM
LEFT ATRIUM VOLUME INDEX: 21 ML/M2
LEFT ATRIUM VOLUME: 40.23 CM3
LEFT INTERNAL DIMENSION IN SYSTOLE: 2.96 CM (ref 2.1–4)
LEFT VENTRICLE DIASTOLIC VOLUME INDEX: 40.98 ML/M2
LEFT VENTRICLE DIASTOLIC VOLUME: 78.37 ML
LEFT VENTRICLE MASS INDEX: 51 G/M2
LEFT VENTRICLE SYSTOLIC VOLUME INDEX: 17.7 ML/M2
LEFT VENTRICLE SYSTOLIC VOLUME: 33.82 ML
LEFT VENTRICULAR INTERNAL DIMENSION IN DIASTOLE: 4.1 CM (ref 3.5–6)
LEFT VENTRICULAR MASS: 97.34 G
LV LATERAL E/E' RATIO: 6.86 M/S
LV SEPTAL E/E' RATIO: 8 M/S
MV A" WAVE DURATION": 118 MSEC
MV PEAK A VEL: 0.63 M/S
MV PEAK E VEL: 0.96 M/S
MV STENOSIS PRESSURE HALF TIME: 44.54 MS
MV VALVE AREA P 1/2 METHOD: 4.94 CM2
PULM VEIN A" WAVE DURATION": 93 MSEC
PULM VEIN S/D RATIO: 1.27
PV PEAK D VEL: 0.48 M/S
PV PEAK S VEL: 0.61 M/S
PV PEAK VELOCITY: 1.61 CM/S
RA MAJOR: 3.66 CM
RA PRESSURE: 3 MMHG
RA WIDTH: 3.04 CM
RIGHT VENTRICULAR END-DIASTOLIC DIMENSION: 2.29 CM
SINUS: 2.12 CM
TDI LATERAL: 0.14 M/S
TDI SEPTAL: 0.12 M/S
TDI: 0.13 M/S
TRICUSPID ANNULAR PLANE SYSTOLIC EXCURSION: 2.28 CM

## 2020-12-07 PROCEDURE — 93306 ECHO (CUPID ONLY): ICD-10-PCS | Mod: 26,,, | Performed by: INTERNAL MEDICINE

## 2020-12-07 PROCEDURE — 93306 TTE W/DOPPLER COMPLETE: CPT | Mod: 26,,, | Performed by: INTERNAL MEDICINE

## 2020-12-07 PROCEDURE — 93306 TTE W/DOPPLER COMPLETE: CPT | Mod: PO

## 2020-12-09 ENCOUNTER — COMMUNICATION - HEALTHEAST (OUTPATIENT)
Dept: CARDIOLOGY | Facility: CLINIC | Age: 50
End: 2020-12-09

## 2020-12-09 DIAGNOSIS — E87.6 HYPOKALEMIA: ICD-10-CM

## 2020-12-15 ENCOUNTER — OFFICE VISIT (OUTPATIENT)
Dept: INTERNAL MEDICINE | Facility: CLINIC | Age: 50
End: 2020-12-15
Payer: COMMERCIAL

## 2020-12-15 VITALS
WEIGHT: 178.38 LBS | SYSTOLIC BLOOD PRESSURE: 130 MMHG | BODY MASS INDEX: 28.67 KG/M2 | HEIGHT: 66 IN | DIASTOLIC BLOOD PRESSURE: 76 MMHG | HEART RATE: 72 BPM | OXYGEN SATURATION: 97 %

## 2020-12-15 DIAGNOSIS — Z00.00 ROUTINE GENERAL MEDICAL EXAMINATION AT A HEALTH CARE FACILITY: Primary | ICD-10-CM

## 2020-12-15 DIAGNOSIS — E03.9 HYPOTHYROIDISM, UNSPECIFIED TYPE: ICD-10-CM

## 2020-12-15 DIAGNOSIS — I51.7 LVH (LEFT VENTRICULAR HYPERTROPHY): ICD-10-CM

## 2020-12-15 DIAGNOSIS — I10 ESSENTIAL HYPERTENSION: ICD-10-CM

## 2020-12-15 PROCEDURE — 1126F PR PAIN SEVERITY QUANTIFIED, NO PAIN PRESENT: ICD-10-PCS | Mod: S$GLB,,, | Performed by: INTERNAL MEDICINE

## 2020-12-15 PROCEDURE — 99396 PR PREVENTIVE VISIT,EST,40-64: ICD-10-PCS | Mod: S$GLB,,, | Performed by: INTERNAL MEDICINE

## 2020-12-15 PROCEDURE — 99999 PR PBB SHADOW E&M-EST. PATIENT-LVL III: CPT | Mod: PBBFAC,,, | Performed by: INTERNAL MEDICINE

## 2020-12-15 PROCEDURE — 3008F PR BODY MASS INDEX (BMI) DOCUMENTED: ICD-10-PCS | Mod: CPTII,S$GLB,, | Performed by: INTERNAL MEDICINE

## 2020-12-15 PROCEDURE — 1126F AMNT PAIN NOTED NONE PRSNT: CPT | Mod: S$GLB,,, | Performed by: INTERNAL MEDICINE

## 2020-12-15 PROCEDURE — 3008F BODY MASS INDEX DOCD: CPT | Mod: CPTII,S$GLB,, | Performed by: INTERNAL MEDICINE

## 2020-12-15 PROCEDURE — 99396 PREV VISIT EST AGE 40-64: CPT | Mod: S$GLB,,, | Performed by: INTERNAL MEDICINE

## 2020-12-15 PROCEDURE — 99999 PR PBB SHADOW E&M-EST. PATIENT-LVL III: ICD-10-PCS | Mod: PBBFAC,,, | Performed by: INTERNAL MEDICINE

## 2020-12-15 RX ORDER — LEVOTHYROXINE SODIUM 50 UG/1
50 TABLET ORAL DAILY
Qty: 90 TABLET | Refills: 3 | Status: SHIPPED | OUTPATIENT
Start: 2020-12-15 | End: 2021-05-27

## 2020-12-15 NOTE — PROGRESS NOTES
Subjective:       Patient ID: Anna Oakley is a 50 y.o. female.    Chief Complaint: Follow-up (2 week f/u) and Fatigue    HPI  Wellness check.  Chart reviewed.  Home BPs 130s/80s.  Still with AM, HA, sl fatigue.  Sticking to low Na diet.  Review of Systems   All other systems reviewed and are negative.      Objective:      Physical Exam  Vitals signs reviewed.   Constitutional:       General: She is not in acute distress.     Appearance: She is well-developed.   HENT:      Head: Normocephalic.      Mouth/Throat:      Mouth: Mucous membranes are moist.   Eyes:      General: No scleral icterus.     Extraocular Movements: Extraocular movements intact.      Conjunctiva/sclera: Conjunctivae normal.   Neck:      Musculoskeletal: Normal range of motion.   Cardiovascular:      Rate and Rhythm: Normal rate and regular rhythm.      Pulses: Normal pulses.      Heart sounds: Normal heart sounds.   Pulmonary:      Effort: Pulmonary effort is normal.      Breath sounds: Normal breath sounds.   Abdominal:      General: There is no distension.   Musculoskeletal: Normal range of motion.      Right lower leg: No edema.      Left lower leg: No edema.   Lymphadenopathy:      Cervical: No cervical adenopathy.   Skin:     General: Skin is warm and dry.   Neurological:      General: No focal deficit present.      Mental Status: She is alert.      Cranial Nerves: No cranial nerve deficit.      Motor: No abnormal muscle tone.      Coordination: Coordination normal.   Psychiatric:         Mood and Affect: Mood normal.         Behavior: Behavior normal.         Assessment:       1. Routine general medical examination at a health care facility    2. Essential hypertension    3. LVH (left ventricular hypertrophy)    4. Hypothyroidism, unspecified type        Plan:       Anna was seen today for follow-up and fatigue.    Diagnoses and all orders for this visit:    Routine general medical examination at a health care  facility    Essential hypertension   Well-cont    LVH (left ventricular hypertrophy)   Cont rx    Hypothyroidism, unspecified type  -     TSH; Future  -     levothyroxine (SYNTHROID) 50 MCG tablet; Take 1 tablet (50 mcg total) by mouth once daily.      Follow up in about 6 months (around 6/15/2021).

## 2021-01-06 ENCOUNTER — OFFICE VISIT - HEALTHEAST (OUTPATIENT)
Dept: CARDIOLOGY | Facility: CLINIC | Age: 51
End: 2021-01-06

## 2021-01-06 DIAGNOSIS — I10 ESSENTIAL HYPERTENSION: ICD-10-CM

## 2021-01-08 ENCOUNTER — OFFICE VISIT - HEALTHEAST (OUTPATIENT)
Dept: CARDIOLOGY | Facility: CLINIC | Age: 51
End: 2021-01-08

## 2021-01-08 DIAGNOSIS — I10 HTN (HYPERTENSION): ICD-10-CM

## 2021-01-08 DIAGNOSIS — I10 BENIGN ESSENTIAL HYPERTENSION: ICD-10-CM

## 2021-01-08 DIAGNOSIS — E03.9 HYPOTHYROIDISM: ICD-10-CM

## 2021-01-08 DIAGNOSIS — R07.89 ATYPICAL CHEST PAIN: ICD-10-CM

## 2021-01-08 LAB
ANION GAP SERPL CALCULATED.3IONS-SCNC: 8 MMOL/L (ref 5–18)
BUN SERPL-MCNC: 11 MG/DL (ref 8–22)
CALCIUM SERPL-MCNC: 8.6 MG/DL (ref 8.5–10.5)
CHLORIDE BLD-SCNC: 106 MMOL/L (ref 98–107)
CHOLEST SERPL-MCNC: 187 MG/DL
CO2 SERPL-SCNC: 25 MMOL/L (ref 22–31)
CREAT SERPL-MCNC: 0.65 MG/DL (ref 0.6–1.1)
FASTING STATUS PATIENT QL REPORTED: YES
GFR SERPL CREATININE-BSD FRML MDRD: >60 ML/MIN/1.73M2
GLUCOSE BLD-MCNC: 90 MG/DL (ref 70–125)
HDLC SERPL-MCNC: 53 MG/DL
LDLC SERPL CALC-MCNC: 118 MG/DL
MAGNESIUM SERPL-MCNC: 1.8 MG/DL (ref 1.8–2.6)
POTASSIUM BLD-SCNC: 4 MMOL/L (ref 3.5–5)
SODIUM SERPL-SCNC: 139 MMOL/L (ref 136–145)
TRIGL SERPL-MCNC: 78 MG/DL
TSH SERPL DL<=0.005 MIU/L-ACNC: 3.25 UIU/ML (ref 0.3–5)

## 2021-01-08 ASSESSMENT — MIFFLIN-ST. JEOR: SCORE: 1778.54

## 2021-01-11 LAB
25(OH)D3 SERPL-MCNC: 10.6 NG/ML (ref 30–80)
ATRIAL RATE - MUSE: 65 BPM
DIASTOLIC BLOOD PRESSURE - MUSE: NORMAL
INTERPRETATION ECG - MUSE: NORMAL
P AXIS - MUSE: 52 DEGREES
PR INTERVAL - MUSE: 160 MS
QRS DURATION - MUSE: 80 MS
QT - MUSE: 420 MS
QTC - MUSE: 436 MS
R AXIS - MUSE: 36 DEGREES
SYSTOLIC BLOOD PRESSURE - MUSE: NORMAL
T AXIS - MUSE: 32 DEGREES
VENTRICULAR RATE- MUSE: 65 BPM

## 2021-01-13 ENCOUNTER — COMMUNICATION - HEALTHEAST (OUTPATIENT)
Dept: FAMILY MEDICINE | Facility: CLINIC | Age: 51
End: 2021-01-13

## 2021-01-13 DIAGNOSIS — Z12.31 OTHER SCREENING MAMMOGRAM: ICD-10-CM

## 2021-01-14 ENCOUNTER — AMBULATORY - HEALTHEAST (OUTPATIENT)
Dept: CARDIOLOGY | Facility: CLINIC | Age: 51
End: 2021-01-14

## 2021-01-14 DIAGNOSIS — R89.9 ABNORMAL LABORATORY TEST RESULT: ICD-10-CM

## 2021-01-19 ENCOUNTER — COMMUNICATION - HEALTHEAST (OUTPATIENT)
Dept: SCHEDULING | Facility: CLINIC | Age: 51
End: 2021-01-19

## 2021-01-19 DIAGNOSIS — R89.9 ABNORMAL LABORATORY TEST RESULT: ICD-10-CM

## 2021-03-01 ENCOUNTER — HOSPITAL ENCOUNTER (OUTPATIENT)
Dept: RADIOLOGY | Facility: HOSPITAL | Age: 51
Discharge: HOME | End: 2021-03-01
Attending: INTERNAL MEDICINE
Payer: COMMERCIAL

## 2021-03-01 DIAGNOSIS — Z12.31 OTHER SCREENING MAMMOGRAM: ICD-10-CM

## 2021-03-01 PROCEDURE — 77067 MAMMO DIGITAL SCREENING BILAT WITH TOMO: ICD-10-PCS | Mod: 26,,, | Performed by: RADIOLOGY

## 2021-03-01 PROCEDURE — 77067 SCR MAMMO BI INCL CAD: CPT | Mod: TC

## 2021-03-01 PROCEDURE — 77063 MAMMO DIGITAL SCREENING BILAT WITH TOMO: ICD-10-PCS | Mod: 26,,, | Performed by: RADIOLOGY

## 2021-03-01 PROCEDURE — 77067 SCR MAMMO BI INCL CAD: CPT | Mod: 26,,, | Performed by: RADIOLOGY

## 2021-03-01 PROCEDURE — 77063 BREAST TOMOSYNTHESIS BI: CPT | Mod: 26,,, | Performed by: RADIOLOGY

## 2021-03-02 ENCOUNTER — TELEPHONE (OUTPATIENT)
Dept: INTERNAL MEDICINE | Facility: CLINIC | Age: 51
End: 2021-03-02

## 2021-03-03 ENCOUNTER — COMMUNICATION - HEALTHEAST (OUTPATIENT)
Dept: CARDIOLOGY | Facility: CLINIC | Age: 51
End: 2021-03-03

## 2021-03-03 DIAGNOSIS — I10 ESSENTIAL HYPERTENSION: ICD-10-CM

## 2021-04-14 ENCOUNTER — COMMUNICATION - HEALTHEAST (OUTPATIENT)
Dept: SCHEDULING | Facility: CLINIC | Age: 51
End: 2021-04-14

## 2021-04-14 DIAGNOSIS — R89.9 ABNORMAL LABORATORY TEST RESULT: ICD-10-CM

## 2021-05-12 ENCOUNTER — OFFICE VISIT (OUTPATIENT)
Dept: URGENT CARE | Facility: URGENT CARE | Age: 51
End: 2021-05-12
Payer: COMMERCIAL

## 2021-05-12 VITALS
DIASTOLIC BLOOD PRESSURE: 88 MMHG | HEIGHT: 69 IN | OXYGEN SATURATION: 97 % | SYSTOLIC BLOOD PRESSURE: 130 MMHG | WEIGHT: 236 LBS | TEMPERATURE: 97.4 F | BODY MASS INDEX: 34.96 KG/M2 | HEART RATE: 83 BPM

## 2021-05-12 DIAGNOSIS — R35.0 URINARY FREQUENCY: ICD-10-CM

## 2021-05-12 DIAGNOSIS — S39.012A BACK STRAIN, INITIAL ENCOUNTER: ICD-10-CM

## 2021-05-12 DIAGNOSIS — R30.0 DYSURIA: Primary | ICD-10-CM

## 2021-05-12 DIAGNOSIS — G62.9 NEUROPATHY: ICD-10-CM

## 2021-05-12 LAB
ALBUMIN UR-MCNC: NEGATIVE MG/DL
APPEARANCE UR: CLEAR
BILIRUB UR QL STRIP: NEGATIVE
COLOR UR AUTO: YELLOW
FERRITIN SERPL-MCNC: 62 NG/ML (ref 8–252)
GLUCOSE UR STRIP-MCNC: NEGATIVE MG/DL
HGB UR QL STRIP: NEGATIVE
KETONES UR STRIP-MCNC: NEGATIVE MG/DL
LEUKOCYTE ESTERASE UR QL STRIP: NEGATIVE
NITRATE UR QL: NEGATIVE
PH UR STRIP: 5.5 PH (ref 5–7)
SOURCE: NORMAL
SP GR UR STRIP: >1.03 (ref 1–1.03)
UROBILINOGEN UR STRIP-ACNC: 0.2 EU/DL (ref 0.2–1)
VIT B12 SERPL-MCNC: 466 PG/ML (ref 193–986)

## 2021-05-12 PROCEDURE — 82607 VITAMIN B-12: CPT | Performed by: FAMILY MEDICINE

## 2021-05-12 PROCEDURE — 81003 URINALYSIS AUTO W/O SCOPE: CPT | Performed by: FAMILY MEDICINE

## 2021-05-12 PROCEDURE — 82728 ASSAY OF FERRITIN: CPT | Performed by: FAMILY MEDICINE

## 2021-05-12 PROCEDURE — 36415 COLL VENOUS BLD VENIPUNCTURE: CPT | Performed by: FAMILY MEDICINE

## 2021-05-12 PROCEDURE — 99204 OFFICE O/P NEW MOD 45 MIN: CPT | Performed by: FAMILY MEDICINE

## 2021-05-12 RX ORDER — CYCLOBENZAPRINE HCL 5 MG
5 TABLET ORAL 3 TIMES DAILY PRN
Qty: 20 TABLET | Refills: 0 | Status: SHIPPED | OUTPATIENT
Start: 2021-05-12 | End: 2021-11-27

## 2021-05-12 ASSESSMENT — MIFFLIN-ST. JEOR: SCORE: 1764.87

## 2021-05-12 NOTE — PROGRESS NOTES
Assessment & Plan     Dysuria  Urinary frequency  - *UA reflex to Microscopic and Culture ([facility] (except [place] and [place])    Back strain, initial encounter  - cyclobenzaprine (FLEXERIL) 5 MG tablet  Dispense: 20 tablet; Refill: 0    Neuropathy  - Ferritin  - Vitamin B12     Will check labs as above. For the back strain a course of muscle relaxants to treat discomfort as described by patient. No evidence of nerve impingement on exam    F/u with PCP in the next week    Additional care tips provided in AVS      Amaury Ruggiero MD   [facility]    Amelia Lyman is a 50 year old female who presents to clinic today for the following health issues:  Chief Complaint   Patient presents with     Urgent Care     Pt in clinic c/o back pain, hand tingling, and urinary frequency.     Back Pain     Numbness     Frequency     HPI    Last night had 3 episodes of needing to wake up to go to the bathroom  Additionally having left lower back strain that started last night wondering if it was related to where she reached far backseat while in 's seat.     Her last UTI was  A couple years . No present burning with urination.    Last week she saw her PCP and discussed sore throat symptoms -- was having symptoms of a dry mouth especially after waking up.     Stressful incidents in life with family/friends passing away.     She does have a hx of carpal tunnel of her hands. No weakness of her extremities. No obvious swelling of the extremities    Tylenol attempted for discomfort    Muscle relaxers have been used in the past    Back strain does feel like a tightness/spasm. No hx of a slipped disc or pinched nerve. No leg weakness      There are no active problems to display for this patient.      Current Outpatient Medications   Medication     cyclobenzaprine (FLEXERIL) 5 MG tablet     AmLODIPine Besylate (NORVASC PO)     hydrochlorothiazide (MICROZIDE) 12.5 MG capsule     POTASSIUM  "CITRATE PO     No current facility-administered medications for this visit.            Objective    /88   Pulse 83   Temp 97.4  F (36.3  C) (Tympanic)   Ht 1.753 m (5' 9\")   Wt 107 kg (236 lb)   SpO2 97%   BMI 34.85 kg/m    Physical Exam   GEN: NAD, aaox3  Back: left of center mid -lumbar discomfort aggravated with forward flexion, flexion to 45, extension for 30 degrees.   HEENT: no enlarged tonsils, no discharge, no trismus      Results for orders placed or performed in visit on 05/12/21   *UA reflex to Microscopic and Culture ([facility] (except [place] and [place])     Status: None    Specimen: Midstream Urine   Result Value Ref Range    Color Urine Yellow     Appearance Urine Clear     Glucose Urine Negative NEG^Negative mg/dL    Bilirubin Urine Negative NEG^Negative    Ketones Urine Negative NEG^Negative mg/dL    Specific Gravity Urine >1.030 1.003 - 1.035    Blood Urine Negative NEG^Negative    pH Urine 5.5 5.0 - 7.0 pH    Protein Albumin Urine Negative NEG^Negative mg/dL    Urobilinogen Urine 0.2 0.2 - 1.0 EU/dL    Nitrite Urine Negative NEG^Negative    Leukocyte Esterase Urine Negative NEG^Negative    Source Midstream Urine                  The use of Dragon/Erenis dictation services may have been used to construct the content in this note; any grammatical or spelling errors are non-intentional. Please contact the author of this note directly if you are in need of any clarification.   "

## 2021-05-12 NOTE — PATIENT INSTRUCTIONS
If you develop worsening symptoms (pain with urination, increased frequency, blood in urine) then return to be evaluated      For the back strain:     1) flexeril (prescriptoin)  every 8 hours as needed    2) Ibuprofen and/or tylenol every 4-6 hours as needed    3) heat packs and /or topical back creams  Avoid excessive sitting/laying as this can weaken your core muscles  If symptoms worsen return to be seen, otherwise expect improvement over the next week.         Follow up appointment with your Doctor in the next week to discuss the numbness/tingling

## 2021-05-24 ENCOUNTER — LAB VISIT (OUTPATIENT)
Dept: LAB | Facility: HOSPITAL | Age: 51
End: 2021-05-24
Attending: INTERNAL MEDICINE
Payer: COMMERCIAL

## 2021-05-24 DIAGNOSIS — E03.9 HYPOTHYROIDISM, UNSPECIFIED TYPE: ICD-10-CM

## 2021-05-24 LAB
T4 FREE SERPL-MCNC: 1.03 NG/DL (ref 0.71–1.51)
TSH SERPL DL<=0.005 MIU/L-ACNC: 5.76 UIU/ML (ref 0.4–4)

## 2021-05-24 PROCEDURE — 84439 ASSAY OF FREE THYROXINE: CPT | Performed by: INTERNAL MEDICINE

## 2021-05-24 PROCEDURE — 84443 ASSAY THYROID STIM HORMONE: CPT | Mod: PO | Performed by: INTERNAL MEDICINE

## 2021-05-24 PROCEDURE — 36415 COLL VENOUS BLD VENIPUNCTURE: CPT | Mod: PO | Performed by: INTERNAL MEDICINE

## 2021-05-27 ENCOUNTER — OFFICE VISIT (OUTPATIENT)
Dept: FAMILY MEDICINE | Facility: CLINIC | Age: 51
End: 2021-05-27
Payer: COMMERCIAL

## 2021-05-27 VITALS
HEART RATE: 92 BPM | WEIGHT: 181.44 LBS | HEIGHT: 66 IN | SYSTOLIC BLOOD PRESSURE: 132 MMHG | DIASTOLIC BLOOD PRESSURE: 82 MMHG | OXYGEN SATURATION: 95 % | TEMPERATURE: 98 F | BODY MASS INDEX: 29.16 KG/M2

## 2021-05-27 DIAGNOSIS — I51.7 LVH (LEFT VENTRICULAR HYPERTROPHY): ICD-10-CM

## 2021-05-27 DIAGNOSIS — Z01.419 WELL WOMAN EXAM: Primary | ICD-10-CM

## 2021-05-27 DIAGNOSIS — E03.9 HYPOTHYROIDISM, UNSPECIFIED TYPE: ICD-10-CM

## 2021-05-27 DIAGNOSIS — I10 ESSENTIAL HYPERTENSION: ICD-10-CM

## 2021-05-27 PROCEDURE — 1126F AMNT PAIN NOTED NONE PRSNT: CPT | Mod: S$GLB,,, | Performed by: STUDENT IN AN ORGANIZED HEALTH CARE EDUCATION/TRAINING PROGRAM

## 2021-05-27 PROCEDURE — 3079F DIAST BP 80-89 MM HG: CPT | Mod: CPTII,S$GLB,, | Performed by: STUDENT IN AN ORGANIZED HEALTH CARE EDUCATION/TRAINING PROGRAM

## 2021-05-27 PROCEDURE — 3075F SYST BP GE 130 - 139MM HG: CPT | Mod: CPTII,S$GLB,, | Performed by: STUDENT IN AN ORGANIZED HEALTH CARE EDUCATION/TRAINING PROGRAM

## 2021-05-27 PROCEDURE — 3079F PR MOST RECENT DIASTOLIC BLOOD PRESSURE 80-89 MM HG: ICD-10-PCS | Mod: CPTII,S$GLB,, | Performed by: STUDENT IN AN ORGANIZED HEALTH CARE EDUCATION/TRAINING PROGRAM

## 2021-05-27 PROCEDURE — 1126F PR PAIN SEVERITY QUANTIFIED, NO PAIN PRESENT: ICD-10-PCS | Mod: S$GLB,,, | Performed by: STUDENT IN AN ORGANIZED HEALTH CARE EDUCATION/TRAINING PROGRAM

## 2021-05-27 PROCEDURE — 3008F PR BODY MASS INDEX (BMI) DOCUMENTED: ICD-10-PCS | Mod: CPTII,S$GLB,, | Performed by: STUDENT IN AN ORGANIZED HEALTH CARE EDUCATION/TRAINING PROGRAM

## 2021-05-27 PROCEDURE — 99212 PR OFFICE/OUTPT VISIT, EST, LEVL II, 10-19 MIN: ICD-10-PCS | Mod: S$GLB,,, | Performed by: STUDENT IN AN ORGANIZED HEALTH CARE EDUCATION/TRAINING PROGRAM

## 2021-05-27 PROCEDURE — 99212 OFFICE O/P EST SF 10 MIN: CPT | Mod: S$GLB,,, | Performed by: STUDENT IN AN ORGANIZED HEALTH CARE EDUCATION/TRAINING PROGRAM

## 2021-05-27 PROCEDURE — 3008F BODY MASS INDEX DOCD: CPT | Mod: CPTII,S$GLB,, | Performed by: STUDENT IN AN ORGANIZED HEALTH CARE EDUCATION/TRAINING PROGRAM

## 2021-05-27 PROCEDURE — 3075F PR MOST RECENT SYSTOLIC BLOOD PRESS GE 130-139MM HG: ICD-10-PCS | Mod: CPTII,S$GLB,, | Performed by: STUDENT IN AN ORGANIZED HEALTH CARE EDUCATION/TRAINING PROGRAM

## 2021-05-27 RX ORDER — LEVOTHYROXINE SODIUM 88 UG/1
88 TABLET ORAL DAILY
Qty: 90 TABLET | Refills: 3 | Status: SHIPPED | OUTPATIENT
Start: 2021-05-27 | End: 2022-03-03 | Stop reason: SDUPTHER

## 2021-05-27 RX ORDER — LOSARTAN POTASSIUM 100 MG/1
100 TABLET ORAL DAILY
Qty: 90 TABLET | Refills: 3 | Status: SHIPPED | OUTPATIENT
Start: 2021-05-27 | End: 2022-03-03 | Stop reason: SDUPTHER

## 2021-05-27 RX ORDER — UBIDECARENONE 75 MG
500 CAPSULE ORAL DAILY
COMMUNITY
End: 2022-08-17

## 2021-05-28 ENCOUNTER — TELEPHONE (OUTPATIENT)
Dept: ADMINISTRATIVE | Facility: OTHER | Age: 51
End: 2021-05-28

## 2021-05-29 ENCOUNTER — TELEPHONE (OUTPATIENT)
Dept: FAMILY MEDICINE | Facility: CLINIC | Age: 51
End: 2021-05-29

## 2021-05-30 NOTE — PATIENT INSTRUCTIONS - HE
We talked about cutting the Amlodipine in half to 5mg from 10mg. Please call with 6 to 10 blood pressure readings over the next couple of weeks to my nurse.Check your blood pressure after being seated for 10 minutes.We are going to plan a stress test. Goal blood pressure is an average of 130/80.My nurse is Elaine and her number is [phone]

## 2021-05-31 VITALS — WEIGHT: 227 LBS | BODY MASS INDEX: 33.62 KG/M2 | HEIGHT: 69 IN

## 2021-06-01 VITALS — WEIGHT: 232 LBS | HEIGHT: 69 IN | BODY MASS INDEX: 34.36 KG/M2

## 2021-06-01 VITALS — BODY MASS INDEX: 34.45 KG/M2 | WEIGHT: 232.6 LBS | HEIGHT: 69 IN

## 2021-06-02 ENCOUNTER — RECORDS - HEALTHEAST (OUTPATIENT)
Dept: ADMINISTRATIVE | Facility: CLINIC | Age: 51
End: 2021-06-02

## 2021-06-03 VITALS — WEIGHT: 238 LBS | BODY MASS INDEX: 34.07 KG/M2 | HEIGHT: 70 IN

## 2021-06-05 VITALS
DIASTOLIC BLOOD PRESSURE: 94 MMHG | HEIGHT: 70 IN | HEART RATE: 80 BPM | SYSTOLIC BLOOD PRESSURE: 132 MMHG | BODY MASS INDEX: 33.87 KG/M2 | WEIGHT: 236.6 LBS | RESPIRATION RATE: 16 BRPM

## 2021-06-07 NOTE — TELEPHONE ENCOUNTER
----- Message from Ever Cooper MD sent at 4/7/2020  9:18 PM CDT -----  Regarding: FW: Change to phone visit  Phone olga jacksong  ----- Message -----  From: Esperanza Olmedo RN  Sent: 4/7/2020   2:46 PM CDT  To: Ever Cooper MD  Subject: Change to phone visit                            -- Message -----  From: Esperanza Olmedo RN  Sent: 4/7/2020   2:38 PM CDT  To: [facility] Scheduling Registration Pool  Subject: ov change to phone                               Pt was seen by you 7/26/19 and was to follow up in 3 months, BP meds adjusted.   change to phone?

## 2021-06-07 NOTE — TELEPHONE ENCOUNTER
----- Message from Ever Cooper MD sent at 4/15/2020  3:27 PM CDT -----  Hi  I spoke with her by phone and I like to have her get a basic metabolic profile and magnesium may be in 4 to 6 weeks when things settle down.  Can we please schedule this.  Also can somebody call her to help her set up my chart she was hoping to get some help so that she can get on my chart.  Let me know if you are able to reach her and set this up.  Thank you mdg

## 2021-06-07 NOTE — PATIENT INSTRUCTIONS - HE
It was nice to visit with you by telephone.  I understand that these are stressful times and talked about this today.  I did do the previous stress test a few years ago that was negative.  We talked about providing me with some blood pressure readings over the next 3 to 4 weeks and calling with any specific concerns.  I also wanted to schedule some laboratory studies to follow-up on your potassium and kidney function.  Please call my nurse Elaine with any heart related questions.Her number is [phone]

## 2021-06-07 NOTE — TELEPHONE ENCOUNTER
Phone call to patient - informed her that she should contact [facility] support team for assistance in setting up program and provided contact # - explained that lab orders placed and  should call her to arrange f/u labs - patient verbalized understanding and offered no questions/concerns at this time.  mg

## 2021-06-08 ENCOUNTER — COMMUNICATION - HEALTHEAST (OUTPATIENT)
Dept: CARDIOLOGY | Facility: CLINIC | Age: 51
End: 2021-06-08

## 2021-06-08 DIAGNOSIS — E87.6 HYPOKALEMIA: ICD-10-CM

## 2021-06-12 NOTE — PROGRESS NOTES
[facility] Progress Note    Assessment:  1.  Hypertension.  Blood pressure appears to be under good control based on blood pressure readings today.  I have asked her to monitor her blood pressure periodically and she does have a blood pressure cuff at home.  I have asked her to contact us with some blood pressure readings.  I renewed her antihypertensive medications today and will plan chemistry profile.  I have asked her to follow-up again in 6 months.  I have asked her to follow-up with her primary care physician.        Plan: Outlined above monitor blood pressure with plans for laboratory studies today    1. Hypertension  Basic metabolic panel    [facility] Clinic - Today   2. Hypokalemia  potassium chloride SA (KLOR-CON M20) 20 MEQ tablet   3. Essential hypertension  hydroCHLOROthiazide (HYDRODIURIL) 12.5 MG tablet   4. Unspecified essential hypertension  amLODIPine (NORVASC) 10 MG tablet         An After Visit Summary was printed and given to the patient.    Subjective:    Esmer Smith is a 46 y.o. female who returned for a planned  follow up visit.  I have not seen her back in follow-up since December 2015.  She is followed secondary to hypertension.  She indicates that she takes her medications on most days but has not been monitoring her blood pressures regularly as requested.  She tells me that a year ago she had a complicated hysterectomy with postop infection.  She had laboratory studies at that time per review of the records were chemistries were stable with a potassium of 3.9 and creatinine 0.61.  She was anemic at that time and encouraged to follow-up with her primary care physician.. She had a negative coronary CT angiogram in 2012.  She reports chest discomfort with increased stress events but otherwise denies complaints of chest pain or shortness of breath..    Review of Systems:   General: WNL  Eyes: WNL  Ears/Nose/Throat: WNL  Lungs: WNL  Heart: WNL  Stomach: WNL  Bladder:  "WNL  Muscle/Joints: WNL  Skin: WNL  Nervous System: WNL  Mental Health: WNL     Blood: WNL      Problem List:    Patient Active Problem List   Diagnosis     Atypical Chest Pain     Hypertension       Social History     Social History     Marital status:      Spouse name: N/A     Number of children: N/A     Years of education: N/A     Occupational History     Not on file.     Social History Main Topics     Smoking status: Never Smoker     Smokeless tobacco: Never Used     Alcohol use Not on file     Drug use: No     Sexual activity: Not on file     Other Topics Concern     Not on file     Social History Narrative       No family history on file.    Current Outpatient Prescriptions   Medication Sig Dispense Refill     amLODIPine (NORVASC) 10 MG tablet Take 1 tablet (10 mg total) by mouth daily. 90 tablet 3     hydroCHLOROthiazide (HYDRODIURIL) 12.5 MG tablet Take 1 tablet (12.5 mg total) by mouth daily. 90 tablet 3     potassium chloride SA (KLOR-CON M20) 20 MEQ tablet Take 1 tablet (20 mEq total) by mouth daily. 90 tablet 3     No current facility-administered medications for this visit.        Objective:     /76 (Patient Site: Right Arm, Patient Position: Sitting, Cuff Size: Adult Large)  Pulse 80  Resp 18  Ht 5' 9\" (1.753 m)  Wt (!) 227 lb (103 kg)  BMI 33.52 kg/m2  (!) 227 lb (103 kg)       Physical Exam:    GENERAL APPEARANCE: alert, no apparent distress  HEENT: no scleral icterus or xanthelasma  NECK: jugular venous pressure within normal limits  CHEST: symmetric, the lungs are clear to auscultation  CARDIOVASCULAR: regular rhythm without murmur, click, or gallop; no carotid bruits  Abdomen: No Organomegaly, masses, bruits, or tenderness. Bowels sounds are present      EXTREMITIES: no cyanosis, clubbing or edema    Cardiac Testing:  ECG today demonstrates normal sinus rhythm.  Normal EKG    Lab Results:    Lab Results   Component Value Date     05/27/2016    K 3.9 06/29/2016     " 05/27/2016    CO2 26 05/27/2016    BUN 11 05/27/2016    CREATININE 0.66 05/27/2016    CALCIUM 9.4 05/27/2016     Lab Results   Component Value Date    LDLDIRECT 111 12/07/2015     BNP (pg/mL)   Date Value   10/10/2012 <10     Creatinine (mg/dL)   Date Value   05/27/2016 0.66   12/07/2015 0.68   09/15/2014 0.74   08/28/2013 0.78     No results found for: LDLCALC  Lab Results   Component Value Date    HGB 13.1 01/02/2011               This note has been dictated using voice recognition software. Any grammatical or context distortions are unintentional and inherent to the software.      Ever Cooper M.D., F.A.C.C.  [facility]  [phone]

## 2021-06-14 ENCOUNTER — OFFICE VISIT (OUTPATIENT)
Dept: OBSTETRICS AND GYNECOLOGY | Facility: CLINIC | Age: 51
End: 2021-06-14
Payer: COMMERCIAL

## 2021-06-14 VITALS
SYSTOLIC BLOOD PRESSURE: 130 MMHG | DIASTOLIC BLOOD PRESSURE: 90 MMHG | WEIGHT: 181.19 LBS | BODY MASS INDEX: 29.25 KG/M2

## 2021-06-14 DIAGNOSIS — Z01.419 WOMEN'S ANNUAL ROUTINE GYNECOLOGICAL EXAMINATION: Primary | ICD-10-CM

## 2021-06-14 DIAGNOSIS — Z11.51 SCREENING FOR HPV (HUMAN PAPILLOMAVIRUS): ICD-10-CM

## 2021-06-14 DIAGNOSIS — Z30.9 ENCOUNTER FOR CONTRACEPTIVE MANAGEMENT, UNSPECIFIED TYPE: ICD-10-CM

## 2021-06-14 DIAGNOSIS — Z12.4 ENCOUNTER FOR PAPANICOLAOU SMEAR FOR CERVICAL CANCER SCREENING: ICD-10-CM

## 2021-06-14 DIAGNOSIS — Z01.419 WELL WOMAN EXAM: ICD-10-CM

## 2021-06-14 PROCEDURE — 87624 HPV HI-RISK TYP POOLED RSLT: CPT | Performed by: NURSE PRACTITIONER

## 2021-06-14 PROCEDURE — 99386 PR PREVENTIVE VISIT,NEW,40-64: ICD-10-PCS | Mod: S$GLB,,, | Performed by: NURSE PRACTITIONER

## 2021-06-14 PROCEDURE — 99386 PREV VISIT NEW AGE 40-64: CPT | Mod: S$GLB,,, | Performed by: NURSE PRACTITIONER

## 2021-06-14 PROCEDURE — 1126F PR PAIN SEVERITY QUANTIFIED, NO PAIN PRESENT: ICD-10-PCS | Mod: S$GLB,,, | Performed by: NURSE PRACTITIONER

## 2021-06-14 PROCEDURE — 3008F BODY MASS INDEX DOCD: CPT | Mod: CPTII,S$GLB,, | Performed by: NURSE PRACTITIONER

## 2021-06-14 PROCEDURE — 1126F AMNT PAIN NOTED NONE PRSNT: CPT | Mod: S$GLB,,, | Performed by: NURSE PRACTITIONER

## 2021-06-14 PROCEDURE — 88175 CYTOPATH C/V AUTO FLUID REDO: CPT | Performed by: NURSE PRACTITIONER

## 2021-06-14 PROCEDURE — 3008F PR BODY MASS INDEX (BMI) DOCUMENTED: ICD-10-PCS | Mod: CPTII,S$GLB,, | Performed by: NURSE PRACTITIONER

## 2021-06-14 PROCEDURE — 99999 PR PBB SHADOW E&M-EST. PATIENT-LVL III: ICD-10-PCS | Mod: PBBFAC,,, | Performed by: NURSE PRACTITIONER

## 2021-06-14 PROCEDURE — 99999 PR PBB SHADOW E&M-EST. PATIENT-LVL III: CPT | Mod: PBBFAC,,, | Performed by: NURSE PRACTITIONER

## 2021-06-14 RX ORDER — MEDROXYPROGESTERONE ACETATE 150 MG/ML
150 INJECTION, SUSPENSION INTRAMUSCULAR
Qty: 1 SYRINGE | Refills: 3 | Status: SHIPPED | OUTPATIENT
Start: 2021-06-14 | End: 2021-06-18

## 2021-06-14 NOTE — TELEPHONE ENCOUNTER
Triage call:    Pt calling regarding vitamin D prescription from Dr. Cooper.   Pt states the pharmacist told her she could get a prescription.  It was recommended to have a prescription sent in for the Vitamin D.   Needs it to be a prescription so she can pay for it through her HSA.   Pt does not have appointment to establish with Dr. Godoy until 2/10/21.  Please advise on vitamin D request.     Pt uses the [facility] on [address]     RN called over to [facility] to find what pool to route message to. RN was advised to route to RN working with Dr. Cooper and was given name of RN from [facility].  RN will route to clinic to advise on patient request.     Ludivina Marinelli RN 01/19/21 3:35 PM  [facility] Nurse Advisor    Reason for Disposition    Caller requesting a NON-URGENT new prescription or refill and triager unable to refill per department policy    Additional Information    Negative: Caller has NON-URGENT medication question about med that PCP prescribed and triager unable to answer question    Negative: Request for URGENT new prescription or refill of 'essential' medication (i.e., likelihood of harm to patient if not taken) and triager unable to fill per department policy    Negative: Prescription not at pharmacy and was prescribed today by PCP    Negative: Pharmacy calling with prescription questions and triager unable to answer question    Negative: Caller has URGENT medication question about med that PCP prescribed and triager unable to answer question    Negative: Caller has medication question about med not prescribed by PCP and triager unable to answer question (e.g., compatibility with other med, storage)    Negative: DOUBLE DOSE (an extra dose or lesser amount) of prescription drug and NO symptoms (Exception: a double dose of antibiotics)    Negative: Diabetes drug error or overdose (e.g., took wrong type of insulin or took extra dose)    Negative: MORE THAN A DOUBLE DOSE of a prescription or  over-the-counter (OTC) drug    Negative: DOUBLE DOSE (an extra dose or lesser amount) of over-the-counter (OTC) drug and any symptoms (e.g., dizziness, nausea, pain, sleepiness)    Negative: DOUBLE DOSE (an extra dose or lesser amount) of prescription drug and any symptoms (e.g., dizziness, nausea, pain, sleepiness)    Negative: Took another person's prescription drug    Negative: Drug overdose and triager unable to answer question    Negative: Caller requesting information unrelated to medicine    Negative: Caller requesting a prescription for Strep throat and has a positive culture result    Negative: Rash while taking a medication or within 3 days of stopping it    Negative: Immunization reaction suspected    Negative: Asthma and having symptoms of asthma (cough, wheezing, etc.)    Negative: Breastfeeding questions about mother's medicines and diet    Protocols used: MEDICATION QUESTION CALL-A-OH

## 2021-06-14 NOTE — PROGRESS NOTES
----- Message -----  From: Harika Godoy MD  Sent: 1/13/2021   3:30 PM CST  To: MD Gerson Rubin,     I generally have folks start with 2000 international unit(s) of D3 daily, available OTC. You could do 50,000 units weekly for a few months, but generally this has fallen out of favor (hard to remember and too easy to over treat).     Bouchra MONIQUE    ----- Message -----  From: Ever Cooper MD  Sent: 1/13/2021   6:56 PM CST  To: Elaine Michael RN, *    Much appreciated.  Elaine can we please let her know to initiate 2000 international units of Vitamin D3 daily which she can get over-the-counter.  In addition she needs to follow-up with Dr. Godoy as a new patient.ILDA Cooper    ----------------------------------------------------------------------  LVM for pt stating recommendations.  Vit D3 ordered. -cleo

## 2021-06-14 NOTE — PATIENT INSTRUCTIONS - HE
It was nice to visit with you today.  We talked about monitoring your blood pressure after being seated for 10 to 15 minutes.  I am going to review the laboratory studies and then will be in touch with you with the results and then will make some decisions about whether we can uptitrate the blood pressure medicines.  We also talked about whether we would repeat the CT scan of your heart given some periodic chest discomfort that you are experiencing.  Please let me know if you have any questions or concerns.  My nurse is Elaine and her number is [phone]

## 2021-06-14 NOTE — TELEPHONE ENCOUNTER
Left message to call back for: patient  Information to relay to patient:  See below and help schedule with Dr Godoy.

## 2021-06-14 NOTE — TELEPHONE ENCOUNTER
Please call patient, her cardiologist reached out letting me know that she is looking for new primary care. i'd be happy to see her for a physical or establish care if she is due.

## 2021-06-14 NOTE — TELEPHONE ENCOUNTER
Patient Returning Call  Reason for call:  Patient called to schedule appointment with Dr Godoy  Information relayed to patient:  Relayed below message from Dr Godoy, writer scheduled as a Physical, unable to use Establish Care or Establish Care Physical due to rescrictions, please adjust appointment if needed.  Patient has additional questions:  No  If YES, what are your questions/concerns:   Okay to leave a detailed message?: No call back needed, unless needed.

## 2021-06-14 NOTE — TELEPHONE ENCOUNTER
----- Message from Ever Cooper MD sent at 1/10/2021  6:56 PM CST -----  That would be great if your office can help her schedule  Thanks ! Gerson Cooper

## 2021-06-16 PROBLEM — E66.811 OBESITY (BMI 30.0-34.9): Status: ACTIVE | Noted: 2018-04-03

## 2021-06-16 PROBLEM — D25.9 UTERINE FIBROID: Status: ACTIVE | Noted: 2021-02-10

## 2021-06-16 NOTE — TELEPHONE ENCOUNTER
RN cannot approve Refill Request    RN can NOT refill this medication No established PCP. Last office visit: 7/26/2019 Ever Cooper MD Last Physical: Visit date not found Last MTM visit: Visit date not found Last visit same specialty: Visit date not found.  Next visit within 3 mo: Visit date not found  Next physical within 3 mo: Visit date not found      Zunilda Mendez, [facility] Triage/Med Refill 4/14/2021    Requested Prescriptions   Pending Prescriptions Disp Refills     cholecalciferol, vitamin D3, 50 mcg (2,000 unit) capsule [Pharmacy Med Name: VITAMIN D3 50 MCG (2,000 UNIT)] 90 capsule 0     Sig: TAKE 1 CAPSULE BY MOUTH EVERY DAY       There is no refill protocol information for this order

## 2021-06-17 NOTE — PROGRESS NOTES
Assessment/Plan:     1. Hypertension: Today clinic BP reading is 120/70 and HR 80- Repeat BP was 120/80 at the end of the visit. She works as a .Reporsts her BP at school last Friday was 180/90 and she had symptoms of headache, blurred vision, chest discomfort, and some numbness in right hand. She reports her BP fluctuates and gets elevated with stressful situation. She did not bring any BP readings from home or her BP cuff with her. She reports occasionally miss taking her BP medications.She has been taking OTC Ibuprofen 600 mg every 4 hours as needed for headache. She doesn't have PCP but has seen provider at [facility]. She followed up with her OBGYN last year November. She has an appointment coming up with Opthalmology next week.    Recommended to check BP and heart rate X 1 week and call with readings. Will consider making adjustment on BP medications.  Highly recommend establish care with PCP regarding her ongoing personal stress with high BP and headache and for other regular health maintenance. Discussed about the importance of staying compliant with BP medications to reduce CV risk and stroke. Encouraged to limit salt intake to 2400 mg/day and avoid using NSAID to prevent CV adverse effect and hypertension.  Also gave instruction on how to check the blood pressure accurately.    Risk factor modification and lifestyle management topics were discussed including managing comorbidities, weight loss, heart healthy diet, exercise and stress reduction.  Cardiac rehab has been ordered.      2. Chest discomfort: Reports chest discomfort with high BP. She denies BAKER, orthopnea, PND, leg swelling, fatigue or abdominal bloating. Likely stress induced and high blood pressure. She had negative CT angio in 2012. Will discuss with Dr. Cooper for any further work up.     3. Hypokalemia: Recent K level 3.5 and on KCl 20 mEq daily.     4. Obesity with BMI of 34.35: Reports weight gain during winter from lack of  exercise and usually able to lose it during summer.  We discussed about the regular physical exercise (fitness center or walking at the mall), weight loss, and heart healthy diet including low sodium diet.     Follow-up with primary care provider   Subjective:     Esmer Smith is a 47 y.o. years old  with a significant PMH of HTN,obesity with a BMI of 34.35, and  atypical chest pain  who is seen at [facility] for follow up per Dr. Cooper. Patient was last seen by Dr. Cooper on July 2017 for high blood pressure and atypical chest discomfort.  She had an EKG done with no new changes.  Her kidney function was unremarkable.  Her most CT angiogram was done in 2012 that was negative.    Today, patient comes in for 6 months follow-up.  Her blood pressure today is 120/70 and heart rate 80.  Repeat blood pressure at the end of the visit was 120/80.  Patient reports her blood pressure last Friday when checked by the school nurse was 180/90.  She had some personal stress event which led her to have high BP.  She reports her blood pressure tends to run high when she is stressed out.  She also experiences some headache, blurry vision, and some numbness in her hand when her blood pressure is high.  She has been taking ibuprofen 600 mg every 4 hours as needed for headache.  She reports she occasionally missed to take her blood pressure medicine.  She tends to gain weight during the winter time due to lack of exercise and able to lose it during summer.  She did not bring her blood pressure cuff with her and has not been monitoring her blood pressure at home.      She denies fatigue, lightheadedness, shortness of breath, dyspnea on exertion, orthopnea, PND, palpitations, chest pain, abdominal fullness/bloating and lower extremity edema.  She has an appointment coming up with her ophthalmology next week.  She does not have a primary care provider but has seen provider at the entire clinic in the past.    Review of Systems:    General: WNL  Eyes: Visual Distubance  Ears/Nose/Throat: WNL  Lungs: WNL  Heart: Chest Pain  Stomach: WNL  Bladder: WNL  Muscle/Joints: WNL  Skin: WNL  Nervous System: WNL  Mental Health: WNL     Blood: WNL     Patient Active Problem List   Diagnosis     Atypical Chest Pain     Hypertension       No past medical history on file.    No past surgical history on file.    No family history on file.    Social History     Social History     Marital status:      Spouse name: N/A     Number of children: N/A     Years of education: N/A     Occupational History     Not on file.     Social History Main Topics     Smoking status: Never Smoker     Smokeless tobacco: Never Used     Alcohol use Not on file     Drug use: No     Sexual activity: Not on file     Other Topics Concern     Not on file     Social History Narrative       Current Outpatient Prescriptions   Medication Sig Dispense Refill     amLODIPine (NORVASC) 10 MG tablet Take 1 tablet (10 mg total) by mouth daily. 90 tablet 3     hydroCHLOROthiazide (HYDRODIURIL) 12.5 MG tablet Take 1 tablet (12.5 mg total) by mouth daily. 90 tablet 3     ibuprofen (ADVIL,MOTRIN) 200 MG tablet Take 200 mg by mouth every 6 (six) hours as needed for headaches (Takes 600 mg every4 hours as needed for headache).       potassium chloride SA (KLOR-CON M20) 20 MEQ tablet Take 1 tablet (20 mEq total) by mouth daily. 90 tablet 3     No current facility-administered medications for this visit.        No Known Allergies    Objective:     Vitals:    04/03/18 1327   BP: 120/70   Pulse: 80   Resp: 20     Body mass index is 34.35 kg/(m^2).    General Appearance:   Alert, cooperative and in no acute distress.   HEENT:  No scleral icterus; the mucous membranes were pink and moist. No JVD and negative HJR.   Chest: The spine was straight. The chest was symmetric.   Lungs:   Respirations unlabored; the lungs are clear to auscultation.   Cardiovascular:   Regular rhythm. S1 and S2 without murmur,  clicks or rubs. Carotid pulses are intact and symmetrical.  No carotid/radial/pedal pulses are intact. CMS intact. No carotid bruits noted.   Abdomen:  Large but soft, nontender, nondistended, bowel sounds present   Extremities: No cyanosis, clubbing, or edema.   Skin: No xanthelasma.   Neurologic: Mood and affect are appropriate.             Lab Review   Lab Results   Component Value Date    CREATININE 0.71 07/27/2017    BUN 13 07/27/2017     07/27/2017    K 3.5 07/27/2017     (H) 07/27/2017    CO2 24 07/27/2017     Creatinine (mg/dL)   Date Value   07/27/2017 0.71   05/27/2016 0.66   12/07/2015 0.68   09/15/2014 0.74       30 minutes were spent with the patient with greater than 50% spent on education and counseling.      Chavo Brown, [facility]

## 2021-06-18 LAB
HPV HR 12 DNA SPEC QL NAA+PROBE: POSITIVE
HPV16 AG SPEC QL: NEGATIVE
HPV18 DNA SPEC QL NAA+PROBE: NEGATIVE

## 2021-06-18 NOTE — PROGRESS NOTES
[facility] Progress Note    Assessment:  1.  Hypertension.  Blood pressures appear to be under good control today.  Blood pressures at home have been better since she has been under less stress and dissipating a more prudent diet and regular exercise.  We spent some time reviewing this today.  Talked about trying not to miss her blood pressure medications and talked about contact me with any specific complaints.  Recent laboratory studies are reviewed from [facility]    2.  Atypical chest discomfort.  Chest discomfort has resolved in the past few weeks now that her stress level is improved.  Blood pressures appear to be under good control.  She had a normal coronary CT angiogram in 2012.  We talked about prudent diet and regular exercise.        Plan: As outlined above.  I did ask her to consider having follow-up lipid studies when she sees her primary care physician.    1. Hypertension           An After Visit Summary was printed and given to the patient.    Subjective:    Esmer Smith is a 47 y.o. female who returned for a planned  follow up visit.  She had some recent stressful family issues and her blood pressure was higher and she was describing some atypical chest discomfort.  She recently had a stress echocardiogram that was reported negative by EKG and echocardiographic criteria.  He was seen in the emergency room at [facility] on [address] and I reviewed the chart records.  She had a negative troponin and laboratory studies were within normal limits.  ECG was reviewed that demonstrated no significant EKG changes by the report.    She indicates that she is trying to do more exercise and cut back on her soda intake and is feeling much better.    Review of Systems:   General: Weight Gain  Eyes: WNL  Ears/Nose/Throat: WNL  Lungs: WNL  Heart: Chest Pain  Stomach: WNL  Bladder: WNL  Muscle/Joints: WNL  Skin: WNL  Nervous System: WNL  Mental Health: Anxiety     Blood: WNL      Problem  "List:    Patient Active Problem List   Diagnosis     Atypical Chest Pain     Hypertension     Obesity (BMI 30.0-34.9)       Social History     Social History     Marital status:      Spouse name: N/A     Number of children: N/A     Years of education: N/A     Occupational History     Not on file.     Social History Main Topics     Smoking status: Never Smoker     Smokeless tobacco: Never Used     Alcohol use Not on file     Drug use: No     Sexual activity: Not on file     Other Topics Concern     Not on file     Social History Narrative       No family history on file.    Current Outpatient Prescriptions   Medication Sig Dispense Refill     acetaminophen (TYLENOL) 325 MG tablet Take 650 mg by mouth every 6 (six) hours as needed for pain.       amLODIPine (NORVASC) 10 MG tablet Take 1 tablet (10 mg total) by mouth daily. 90 tablet 3     hydroCHLOROthiazide (HYDRODIURIL) 12.5 MG tablet Take 1 tablet (12.5 mg total) by mouth daily. 90 tablet 3     potassium chloride SA (KLOR-CON M20) 20 MEQ tablet Take 1 tablet (20 mEq total) by mouth daily. 90 tablet 3     No current facility-administered medications for this visit.        Objective:     /88 (Patient Site: Left Arm, Patient Position: Sitting, Cuff Size: Adult Regular)  Pulse 68  Resp 18  Ht 5' 9\" (1.753 m)  Wt (!) 232 lb (105.2 kg)  BMI 34.26 kg/m2  (!) 232 lb (105.2 kg)       Physical Exam:    GENERAL APPEARANCE: alert, no apparent distress  HEENT: no scleral icterus or xanthelasma  NECK: jugular venous pressure within normal limits  CHEST: symmetric, the lungs are clear to auscultation  CARDIOVASCULAR: regular rhythm without murmur, click, or gallop; no carotid bruits  Abdomen: No Organomegaly, masses, bruits, or tenderness. Bowels sounds are present      EXTREMITIES: no cyanosis, clubbing or edema    Cardiac Testing:   Scan on: 6/15/18 2:10 PM by:       Indications      Chest discomfort     HTN (hypertension)       Interpretation Summary      "   The patient exercised for 7 minutes and 43 seconds under a Royal protocol.  This represents an average exercise capacity for age and gender.    There is a normal heart rate and blood pressure response to exercise with no reported exertional symptoms.    The stress electrocardiogram is negative for inducible ischemic changes    Baseline screening echocardiogram reveals normal biventricular size and systolic function with an LVEF is 60% and no hemodynamically significant valvular disease.    Stress echocardiogram does not reveal any inducible ischemia.    Overall the sensitivity of these findings are mildly reduced due to relatively poor acoustic windows from patient's body habitus.    On direct comparison to the prior study dated 12/1/2012, there has been no significant change.         Lab Results:    Lab Results   Component Value Date     07/27/2017    K 3.5 07/27/2017     (H) 07/27/2017    CO2 24 07/27/2017    BUN 13 07/27/2017    CREATININE 0.71 07/27/2017    CALCIUM 8.8 07/27/2017     Lab Results   Component Value Date    LDLDIRECT 111 12/07/2015     BNP (pg/mL)   Date Value   10/10/2012 <10     Creatinine (mg/dL)   Date Value   07/27/2017 0.71   05/27/2016 0.66   12/07/2015 0.68   09/15/2014 0.74     No results found for: LDLCALC  Lab Results   Component Value Date    HGB 13.1 01/02/2011               This note has been dictated using voice recognition software. Any grammatical or context distortions are unintentional and inherent to the software.      Ever Cooper M.D., F.A.C.C.  [facility]  [phone]

## 2021-06-19 LAB
FINAL PATHOLOGIC DIAGNOSIS: NORMAL
Lab: NORMAL

## 2021-06-21 ENCOUNTER — PATIENT MESSAGE (OUTPATIENT)
Dept: OBSTETRICS AND GYNECOLOGY | Facility: CLINIC | Age: 51
End: 2021-06-21

## 2021-06-21 DIAGNOSIS — B97.7 HPV IN FEMALE: ICD-10-CM

## 2021-06-27 NOTE — PROGRESS NOTES
Progress Notes by Ever Cooper MD at 7/26/2019  1:30 PM     Author: Ever Cooper MD Service: -- Author Type: Physician    Filed: 7/26/2019  2:23 PM Encounter Date: 7/26/2019 Status: Signed    : Ever Cooper MD (Physician)       [facility] Progress Note    Assessment:  1.  Hypertension.  Blood pressure appears to be under good control based on blood pressures shortly obtained.  She would like to try to cut back on her antihypertensive medications and therefore we are going to decrease the amlodipine to 5 mg daily.  Recent laboratory studies are reviewed and were within normal limits except for mild elevation in TSH.  She is asked to monitor and report her blood pressure over the next few weeks.    2.  Atypical chest discomfort with a history of atypical chest discomfort.  Negative stress echocardiogram 1 year ago.  She had a negative coronary CT angiogram in 2012.  Given the most recent episodes of chest discomfort we are going to plan a repeat stress test in the form of an exercise stress echocardiogram.  I will comment once this is available for review.    3.  Mild elevation in TSH.  She will continue to monitor and follow-up with her primary care physician has been placed on low-dose Synthroid by her primary care provider.      Plan: Decrease amlodipine to 5 mg daily and follow-up in 4 months.    1. Atypical chest pain  Echo Stress Exercise   2. Hypertension           An After Visit Summary was printed and given to the patient.    Subjective:    Esmer Smith is a 48 y.o. female who returned for a planned  follow up visit.  I have reviewed recent notes from her primary care physician in the ER.  She presented to the ER and can night in June 23, 2019 with atypical chest discomfort.  She reported at that time right-sided chest discomfort radiating to her right arm and leg is worsening with lying flat.  She also complained of some diaphoresis.  She underwent a CT scan of the  second abdominal aorta which was negative.  Small amount of free fluid in the pelvis.  Was also noted to have a mild elevation in TSH in the 6.24 range.  Absolutely seen by her primary care physician where she was started on Synthroid.  Pressures both by her primary care physician in the ER were adequately controlled in the 120/70 range.  She does follow for hypertension and has been on a combination of amlodipine and hydrochlorothiazide.  She has not been monitoring her blood pressure regularly.    Review of Systems:   General: WNL  Eyes: WNL  Ears/Nose/Throat: WNL  Lungs: WNL  Heart: Chest Pain(Patient denies experiencing chest pain at this time. )  Stomach: WNL  Bladder: WNL  Muscle/Joints: Muscle Pain  Skin: WNL  Nervous System: WNL  Mental Health: WNL     Blood: WNL      Problem List:    Patient Active Problem List   Diagnosis   ? Atypical Chest Pain   ? Hypertension   ? Obesity (BMI 30.0-34.9)       Social History     Socioeconomic History   ? Marital status:      Spouse name: Not on file   ? Number of children: Not on file   ? Years of education: Not on file   ? Highest education level: Not on file   Occupational History   ? Not on file   Social Needs   ? Financial resource strain: Not on file   ? Food insecurity:     Worry: Not on file     Inability: Not on file   ? Transportation needs:     Medical: Not on file     Non-medical: Not on file   Tobacco Use   ? Smoking status: Never Smoker   ? Smokeless tobacco: Never Used   Substance and Sexual Activity   ? Alcohol use: Not on file   ? Drug use: No   ? Sexual activity: Not on file   Lifestyle   ? Physical activity:     Days per week: Not on file     Minutes per session: Not on file   ? Stress: Not on file   Relationships   ? Social connections:     Talks on phone: Not on file     Gets together: Not on file     Attends Pent[employer]alism service: Not on file     Active member of club or organization: Not on file     Attends meetings of clubs or organizations:  "Not on file     Relationship status: Not on file   ? Intimate partner violence:     Fear of current or ex partner: Not on file     Emotionally abused: Not on file     Physically abused: Not on file     Forced sexual activity: Not on file   Other Topics Concern   ? Not on file   Social History Narrative   ? Not on file       No family history on file.    Current Outpatient Medications   Medication Sig Dispense Refill   ? amLODIPine (NORVASC) 10 MG tablet TAKE 1 TABLET (10 MG TOTAL) BY MOUTH DAILY. 90 tablet 0   ? hydroCHLOROthiazide (HYDRODIURIL) 12.5 MG tablet TAKE 1 TABLET (12.5 MG TOTAL) BY MOUTH DAILY. 90 tablet 0   ? KLOR-CON M20 20 mEq tablet TAKE 1 TABLET (20 MEQ TOTAL) BY MOUTH DAILY. 90 tablet 0   ? levothyroxine (SYNTHROID, LEVOTHROID) 50 MCG tablet Take 50 mcg by mouth daily.     ? acetaminophen (TYLENOL) 325 MG tablet Take 650 mg by mouth every 6 (six) hours as needed for pain.     ? amLODIPine (NORVASC) 10 MG tablet TAKE 1 TABLET (10 MG TOTAL) BY MOUTH DAILY. 90 tablet 0   ? hydroCHLOROthiazide (HYDRODIURIL) 12.5 MG tablet TAKE 1 TABLET (12.5 MG TOTAL) BY MOUTH DAILY. 90 tablet 0     No current facility-administered medications for this visit.        Objective:     BP 96/64 (Patient Site: Right Arm, Patient Position: Sitting, Cuff Size: Adult Large)   Pulse 84   Resp 16   Ht 5' 9.69\" (1.77 m)   Wt (!) 238 lb (108 kg)   BMI 34.46 kg/m    120/80.    Physical Exam:    GENERAL APPEARANCE: alert, no apparent distress  HEENT: no scleral icterus or xanthelasma  NECK: jugular venous pressure normal limits  CHEST: symmetric, the lungs are clear to auscultation  CARDIOVASCULAR: regular rhythm without murmur, click, or gallop; no carotid bruits  Abdomen: No Organomegaly, masses, bruits, or tenderness. Bowels sounds are present      EXTREMITIES: no cyanosis, clubbing or edema    Cardiac Testing:  Performing Date Performing Department   Servando 15, 2018 [facility] [[id number]]   Patient Information "     Patient Name  Esmer Smith MRN  [id number] Sex  Female              Age  1970 (48 y.o.)   EKG Scan       Scan on: 6/15/18 2:10 PM by:    Indications     Chest discomfort   HTN (hypertension)   Interpretation Summary       The patient exercised for 7 minutes and 43 seconds under a Royal protocol.  This represents an average exercise capacity for age and gender.    There is a normal heart rate and blood pressure response to exercise with no reported exertional symptoms.    The stress electrocardiogram is negative for inducible ischemic changes    Baseline screening echocardiogram reveals normal biventricular size and systolic function with an LVEF is 60% and no hemodynamically significant valvular disease.    Stress echocardiogram does not reveal any inducible ischemia.    Overall the sensitivity of these findings are mildly reduced due to relatively poor acoustic windows from patient's body habitus.    On direct comparison to the prior study dated 2012, there has been no significant change.           Lab Results:    Lab Results   Component Value Date     2018    K 3.9 2018     (H) 2018    CO2 26 2018    BUN 9 2018    CREATININE 0.67 2018    CALCIUM 9.0 2018     Lab Results   Component Value Date    CHOL 193 2018    TRIG 54 2018    HDL 53 2018    LDLDIRECT 111 2015     BNP (pg/mL)   Date Value   10/10/2012 <10     Creatinine (mg/dL)   Date Value   2018 0.67   2017 0.71   2016 0.66   2015 0.68     LDL Calculated (mg/dL)   Date Value   2018 129     Lab Results   Component Value Date    HGB 13.1 2011               This note has been dictated using voice recognition software. Any grammatical or context distortions are unintentional and inherent to the software.      Ever Cooper M.D., F.A.C.C.  [facility]  [phone]

## 2021-06-29 NOTE — PROGRESS NOTES
"Progress Notes by Ever Cooper MD at 4/15/2020  3:10 PM     Author: Ever Cooper MD Service: -- Author Type: Physician    Filed: 4/15/2020  4:21 PM Encounter Date: 4/15/2020 Status: Signed    : Ever Cooper MD (Physician)           The patient has been notified of following:     \"This telephone visit will be conducted via a call between you and your physician/provider. We have found that certain health care needs can be provided without the need for a physical exam.  This service lets us provide the care you need with a phone conversation.  If a prescription is necessary we can send it directly to your pharmacy.  If lab work is needed we can place an order for that and you can then stop by our lab to have the test done at a later time. If during the course of the call the physician/provider feels a telephone visit is not appropriate, you will not be charged for this service.\" Verbal consent has been obtained for this service by care team member:         HEART CARE PHONE ENCOUNTER        The patient has chosen to have the visit conducted as a telephone visit, to reduce risk of exposure given the current status of Coronavirus in our community. This telephone visit is being conducted via a call between the patient and physician/provider. Health care needs are being provided without a physical exam.     Assessment/Recommendations   Assessment:    1.  Hypertension.  Blood pressures have been under good control historically with a combination of medications that she has been on.  She is tolerating the medications but is not monitoring her blood pressures regularly and is going to try to get back to the habit.  Did review a note from an ER visit for atypical chest pain January 2020  Which time her blood pressure was said to be 128/85.    2.  Atypical chest discomfort.  She does have a fair amount of anxiety and we did talk about this.  She intermittently has some chest discomfort which sounds " musculoskeletal on her description with certain movements and position changes.  She was seen in the emergency room as noted in January 2020 and was felt to have musculoskeletal discomfort.  She is trying to be more regular with respect to exercise and laxation techniques.  He had an exercise stress echocardiogram June 2018 that was negative for ischemia with somewhat challenging imaging.  She has a CT coronary angiogram from 2012 that was negative for coronary artery disease and a 0 calcium score.  Plan:  1.  She is requested to get some additional blood pressure readings over the next few weeks and follow-up laboratory studies once things settle down with respect to the virus.      Follow Up Plan: Follow up in   I have reviewed the note as documented.  This accurately captures the substance of my conversation with the patient.    Total time of call between patient and provider was 15 minutes   Start Time:310  Stop Time:325       History of Present Illness/Subjective    Esmer Smith is a 49 y.o. female who is being evaluated via a billable telephone visit.    She has a history of hypertension and is followed intermittently with me in the office.  She does indicate that she has been under a fair amount of stress as a relates to current issues related to the virus, family related issues and she is a schoolteacher and trying to teach remotely.  She has not monitored her blood pressure recently and did ask her to consider doing this periodically.  She continues with the current combination of medications.  She is taking 10 mg of amlodipine.  I had previously recommended that she cut the dose to 5 mg but she states that she has been taking 10 mg.  She is not describing dizziness or lightheadedness.  She has intermittently had some atypical chest discomfort which he describes as being musculoskeletal.  Patient was seen in the ER at [place] January 2020 with some symptoms of dizziness related to some increased distress  and some chest discomfort that per the ER note was felt to be musculoskeletal based on their note.  A troponin during that visit was normal but no additional electrolytes were obtained.  The most recent laboratory studies otherwise in the chart record are from 2019 where her creatinine was 0.73, sodium 138, potassium 3.5, chloride 106, CO2 24.  I did suggest that once things settle down with the virus that she does have some laboratory studies follow-up on her chemistries as she is on 12.5 mg of hydrochlorothiazide  Amlodipine 10 mg daily, potassium 20 mEq daily and Synthroid.    Patient Information     Patient Name   Esmer Smith  MRN   [id number]  Sex   Female   1   1970 (47 y.o.)    EKG Scan      Stress Test Data - Scan on 6/15/18 2:10 PM by     Indications     Chest discomfort    HTN (hypertension)    Interpretation Summary       The patient exercised for 7 minutes and 43 seconds under a Royal protocol.  This represents an average exercise capacity for age and gender.    There is a normal heart rate and blood pressure response to exercise with no reported exertional symptoms.    The stress electrocardiogram is negative for inducible ischemic changes    Baseline screening echocardiogram reveals normal biventricular size and systolic function with an LVEF is 60% and no hemodynamically significant valvular disease.    Stress echocardiogram does not reveal any inducible ischemia.    Overall the sensitivity of these findings are mildly reduced due to relatively poor acoustic windows from patient's body habitus.    On direct comparison to the prior study dated 2012, there has been no significant change       I have reviewed and updated the patient's Past Medical History, Social History, Family History and Medication List.     Physical Examination not performed given phone encounter Review of Systems                                                Medical History  Surgical History Family History  Social History   No past medical history on file. No past surgical history on file. No family history on file. Social History     Socioeconomic History   ? Marital status:      Spouse name: Not on file   ? Number of children: Not on file   ? Years of education: Not on file   ? Highest education level: Not on file   Occupational History   ? Not on file   Social Needs   ? Financial resource strain: Not on file   ? Food insecurity     Worry: Not on file     Inability: Not on file   ? Transportation needs     Medical: Not on file     Non-medical: Not on file   Tobacco Use   ? Smoking status: Never Smoker   ? Smokeless tobacco: Never Used   Substance and Sexual Activity   ? Alcohol use: Not on file   ? Drug use: No   ? Sexual activity: Not on file   Lifestyle   ? Physical activity     Days per week: Not on file     Minutes per session: Not on file   ? Stress: Not on file   Relationships   ? Social connections     Talks on phone: Not on file     Gets together: Not on file     Attends Mandaeism service: Not on file     Active member of club or organization: Not on file     Attends meetings of clubs or organizations: Not on file     Relationship status: Not on file   ? Intimate partner violence     Fear of current or ex partner: Not on file     Emotionally abused: Not on file     Physically abused: Not on file     Forced sexual activity: Not on file   Other Topics Concern   ? Not on file   Social History Narrative   ? Not on file          Medications  Allergies   Current Outpatient Medications   Medication Sig Dispense Refill   ? acetaminophen (TYLENOL) 325 MG tablet Take 650 mg by mouth every 6 (six) hours as needed for pain.     ? amLODIPine (NORVASC) 10 MG tablet TAKE 1 TABLET (10 MG TOTAL) BY MOUTH DAILY. 90 tablet 0   ? hydroCHLOROthiazide (HYDRODIURIL) 12.5 MG tablet TAKE 1 TABLET (12.5 MG TOTAL) BY MOUTH DAILY. 90 tablet 0   ? KLOR-CON M20 20 mEq tablet TAKE 1 TABLET (20 MEQ TOTAL) BY MOUTH DAILY. 90 tablet 0    ? amLODIPine (NORVASC) 10 MG tablet TAKE 1/2 TABLET BY MOUTH DAILY 45 tablet 0   ? hydroCHLOROthiazide (HYDRODIURIL) 12.5 MG tablet TAKE 1 TABLET BY MOUTH EVERY DAY 90 tablet 0   ? levothyroxine (SYNTHROID, LEVOTHROID) 50 MCG tablet Take 50 mcg by mouth daily.       No current facility-administered medications for this visit.     Allergies   Allergen Reactions   ? Chocolate      itching         Lab Results    Chemistry/lipid CBC Cardiac Enzymes/BNP/TSH/INR   Lab Results   Component Value Date    CHOL 193 09/25/2018    HDL 53 09/25/2018    LDLCALC 129 09/25/2018    TRIG 54 09/25/2018    CREATININE 0.67 09/25/2018    BUN 9 09/25/2018    K 3.9 09/25/2018     09/25/2018     (H) 09/25/2018    CO2 26 09/25/2018    Lab Results   Component Value Date    HGB 13.1 01/02/2011    Lab Results   Component Value Date    TROPONINI <0.01 12/06/2012    BNP <10 10/10/2012    TSH 3.61 06/28/2019        Ever Cooper

## 2021-06-30 NOTE — PROGRESS NOTES
"Progress Notes by Ever Cooper MD at 1/6/2021  8:10 AM     Author: Ever Cooper MD Service: -- Author Type: Physician    Filed: 1/6/2021  8:47 AM Encounter Date: 1/6/2021 Status: Signed    : Ever Cooper MD (Physician)           The patient has been notified of following:     \"This video visit will be conducted via a call between you and your physician/provider. We have found that certain health care needs can be provided without the need for an in-person physical exam.  This service lets us provide the care you need with a video conversation.  If a prescription is necessary we can send it directly to your pharmacy.  If lab work is needed we can place an order for that and you can then stop by our lab to have the test done at a later time.      Patient has given verbal consent to a Video visit? Yes    HEART CARE VIDEO ENCOUNTER        The patient has chosen to have the visit conducted as a video visit, to reduce risk of exposure given the current status of Coronavirus in our community. This video visit is being conducted via a call between the patient and physician/provider. Health care needs are being provided without a physical exam.     Assessment/Recommendations   Assessment/Plan:  1.  Hypertension.  She is reporting to me that her blood pressures are perhaps running higher than ideal.  She only provided with a few blood pressure readings and I thought it would be best if she came to the office for blood pressure evaluation.  She will check her blood pressure between now and Friday and bring me some additional blood pressure readings.  She currently is on amlodipine and hydrochlorothiazide.    2.  History of atypical chest discomfort.  She had a negative coronary CT angiogram in 2012.  She had atypical chest discomfort and was evaluated in January 2020 had a negative stress echocardiogram in 2018.    Follow Up Plan: This upcoming Friday  I have reviewed the note as documented.  This accurately " captures the substance of my conversation with the patient.    Total time of video between patient and provider was 15 minutes   Start Time:805   Stop Time:820    Originating Location (pt. Location): Home    Distant Location (provider location):  [facility]     Mode of Communication:  Video Conference via [email or web address]       History of Present Illness/Subjective    Esmer Smith is a 50 y.o. female who is being evaluated via a billable video visit and has consented to a video visit. Esmer Smith has a history of hypertension.  She reports to me today that she has been under a fair amount of stress related to family issues and work issues and was tearful during our visit.  She tells me she is feeling overwhelmed but is not having any thoughts of hurting herself.  She is not been to see her primary care physician.  She tells me that her blood pressures been running high although she provides any which is a few readings this past  she reported the blood pressure is 150/100 and the previous number of 148/98.  She tells me that on her own she increased the amlodipine back to 10 mg daily.  She has not had follow-up blood work.  She is not describing any specific cardiovascular symptoms.  I suggested to her today that it would be best if she came to visit with me in the office so that we can check her blood pressure in the office and consider repeat laboratory studies.    Patient Information    Patient Name   Esmer Smith MRN   [id number] Sex   Female  1   1970 (47 y.o.)   EKG Scan     Stress Test Data - Scan on 6/15/18 2:10 PM by    Indications    Chest discomfort   HTN (hypertension)   Interpretation Summary      The patient exercised for 7 minutes and 43 seconds under a Royal protocol.  This represents an average exercise capacity for age and gender.    There is a normal heart rate and blood pressure response to exercise with no reported exertional symptoms.    The  stress electrocardiogram is negative for inducible ischemic changes    Baseline screening echocardiogram reveals normal biventricular size and systolic function with an LVEF is 60% and no hemodynamically significant valvular disease.    Stress echocardiogram does not reveal any inducible ischemia.    Overall the sensitivity of these findings are mildly reduced due to relatively poor acoustic windows from patient's body habitus.    On direct comparison to the prior study dated 12/1/2012, there has been no significant change       I have reviewed and updated the patient's Past Medical History, Social History, Family History and Medication List.     Physical Examination performed via live video encounter Review of Systems   General Appearance:   no distress, normal body habitus, upright.  Irritable at times   ENT/Mouth: membranes moist, no nasal discharge or bleeding gums.  Normal head shape, no evidence of injury or laceration.     EYES:  no scleral icterus, normal conjunctivae   Neck: no evidence of thyromegaly.  Supple   Chest/Lungs:   No audible wheezing equal chest wall expansion. Non labored breathing.  No cough.   Cardiovascular:      Abdomen:     Extremities:    Skin:    Neurologic: Normal arm motion bilateral, no tremors.  No evidence of focal defect.       Psychiatric: alert and oriented x3, calm                                               Medical History  Surgical History Family History Social History   No past medical history on file. No past surgical history on file. No family history on file.   Social History     Socioeconomic History   ? Marital status:      Spouse name: Not on file   ? Number of children: Not on file   ? Years of education: Not on file   ? Highest education level: Not on file   Occupational History   ? Not on file   Social Needs   ? Financial resource strain: Not on file   ? Food insecurity     Worry: Not on file     Inability: Not on file   ? Transportation needs     Medical:  Not on file     Non-medical: Not on file   Tobacco Use   ? Smoking status: Never Smoker   ? Smokeless tobacco: Never Used   Substance and Sexual Activity   ? Alcohol use: Not on file   ? Drug use: No   ? Sexual activity: Not on file   Lifestyle   ? Physical activity     Days per week: Not on file     Minutes per session: Not on file   ? Stress: Not on file   Relationships   ? Social connections     Talks on phone: Not on file     Gets together: Not on file     Attends Mosque service: Not on file     Active member of club or organization: Not on file     Attends meetings of clubs or organizations: Not on file     Relationship status: Not on file   ? Intimate partner violence     Fear of current or ex partner: Not on file     Emotionally abused: Not on file     Physically abused: Not on file     Forced sexual activity: Not on file   Other Topics Concern   ? Not on file   Social History Narrative   ? Not on file          Medications  Allergies   Current Outpatient Medications   Medication Sig Dispense Refill   ? acetaminophen (TYLENOL) 325 MG tablet Take 650 mg by mouth every 6 (six) hours as needed for pain.     ? amLODIPine (NORVASC) 10 MG tablet Take 1 tablet (10 mg total) by mouth daily. 90 tablet 1   ? hydroCHLOROthiazide (HYDRODIURIL) 12.5 MG tablet Take 1 tablet (12.5 mg total) by mouth daily. 90 tablet 1   ? hydrOXYzine HCL (ATARAX) 10 MG tablet Take 10 mg by mouth 3 (three) times a day as needed for itching.     ? KLOR-CON M20 20 mEq tablet TAKE 1 TABLET BY MOUTH EVERY DAY 90 tablet 0   ? levothyroxine (SYNTHROID, LEVOTHROID) 50 MCG tablet Take 50 mcg by mouth daily.       No current facility-administered medications for this visit.     Allergies   Allergen Reactions   ? Chocolate      itching         Lab Results    Chemistry/lipid CBC Cardiac Enzymes/BNP/TSH/INR   Lab Results   Component Value Date    CHOL 193 09/25/2018    HDL 53 09/25/2018    LDLCALC 129 09/25/2018    TRIG 54 09/25/2018    CREATININE  0.67 09/25/2018    BUN 9 09/25/2018    K 3.9 09/25/2018     09/25/2018     (H) 09/25/2018    CO2 26 09/25/2018    Lab Results   Component Value Date    HGB 13.1 01/02/2011    Lab Results   Component Value Date    TROPONINI <0.01 12/06/2012    BNP <10 10/10/2012    TSH 3.61 06/28/2019        Ever Cooper

## 2021-06-30 NOTE — PROGRESS NOTES
Progress Notes by Ever Cooper MD at 1/8/2021  7:50 AM     Author: Ever Cooper MD Service: -- Author Type: Physician    Filed: 1/8/2021  9:14 AM Encounter Date: 1/8/2021 Status: Signed    : Ever Cooper MD (Physician)             [facility] Progress Note    Assessment:  1.  Hypertension.  She does report some intermittent elevation in blood pressure but there does appear to be a stress and anxiety component.  We will plan to check some laboratory studies today and then make decisions about whether uptitrating the hydrochlorothiazide or adding an ACE inhibitor would be next options.  She does try to follow a low-sodium diet and she does try to walk regularly.    2.  Chest discomfort.  Atypical features.  Negative stress echocardiogram in 2018 with a negative coronary CT angiography dating back to 2012.  She does have a significant family history of heart disease.  Need to recheck lipids today.  Cholesterol numbers from September 2018 revealed a cholesterol 193 with an LDL of 129.  ECG today demonstrates normal sinus rhythm, normal resting EKG    3.  Stress and anxiety.  Patient does have a fair amount of stressors related to family issues and work issues.  We talked about options for further evaluation and I am going to correspond with the primary care provider and see if we get her reestablished with her primary care.      Plan: As outlined above with plan follow-up in 3 months with telephone conversation in the interim.    1. Atypical chest pain  ECG Clinic - Today    Lipid Profile   2. Benign essential hypertension  Basic metabolic panel    Magnesium    Vitamin D, Total (25-Hydroxy)    Lipid Profile   3. Hypothyroidism  Thyroid Stimulating Hormone (TSH)    Magnesium         An After Visit Summary was printed and given to the patient.    Subjective:    Esmer Smith is a 50 y.o. female who returned for a planned  follow up visit.  She has a history of hypertension and  tells me that her blood pressures have been running somewhat higher as of late.  She states they can run from 140-150/ although this appears to be at times when she is more anxious.  She reports that he continues to walk regularly but has been under a fair amount of stress.  Increased stressors related to family issues as well as work issues and Covid related issues.  We spent a good portion of this visit discussing blood pressure management and relaxation techniques.  In addition she is to reestablish with primary care.    She reports some episodic chest discomfort that is mid substernal with occasional radiation to her left arm.  There is no clear exertional component of the symptom.  She has had intermittent chest discomfort for quite some time.  Work-up in the past has been negative.  Had a negative coronary CT angiogram in 2012.  Stress echocardiogram was negative for inducible ischemia with normal left ventricular function in 2018.    Review of Systems:   General: Weight Loss  Eyes: WNL  Ears/Nose/Throat: WNL  Lungs: WNL  Heart: Chest Pain, Arm Pain  Stomach: WNL  Bladder: WNL  Muscle/Joints: Muscle Pain  Skin: WNL  Nervous System: WNL  Mental Health: Depression, Anxiety     Blood: WNL      Problem List:    Patient Active Problem List   Diagnosis   ? Atypical Chest Pain   ? Hypertension   ? Obesity (BMI 30.0-34.9)       Social History     Socioeconomic History   ? Marital status:      Spouse name: Not on file   ? Number of children: Not on file   ? Years of education: Not on file   ? Highest education level: Not on file   Occupational History   ? Not on file   Social Needs   ? Financial resource strain: Not on file   ? Food insecurity     Worry: Not on file     Inability: Not on file   ? Transportation needs     Medical: Not on file     Non-medical: Not on file   Tobacco Use   ? Smoking status: Never Smoker   ? Smokeless tobacco: Never Used   Substance and Sexual Activity   ? Alcohol use: Not on  "file   ? Drug use: No   ? Sexual activity: Not on file   Lifestyle   ? Physical activity     Days per week: Not on file     Minutes per session: Not on file   ? Stress: Not on file   Relationships   ? Social connections     Talks on phone: Not on file     Gets together: Not on file     Attends Faith service: Not on file     Active member of club or organization: Not on file     Attends meetings of clubs or organizations: Not on file     Relationship status: Not on file   ? Intimate partner violence     Fear of current or ex partner: Not on file     Emotionally abused: Not on file     Physically abused: Not on file     Forced sexual activity: Not on file   Other Topics Concern   ? Not on file   Social History Narrative   ? Not on file       No family history on file.    Current Outpatient Medications   Medication Sig Dispense Refill   ? acetaminophen (TYLENOL) 325 MG tablet Take 650 mg by mouth every 6 (six) hours as needed for pain.     ? amLODIPine (NORVASC) 10 MG tablet Take 1 tablet (10 mg total) by mouth daily. 90 tablet 1   ? hydroCHLOROthiazide (HYDRODIURIL) 12.5 MG tablet Take 1 tablet (12.5 mg total) by mouth daily. 90 tablet 1   ? KLOR-CON M20 20 mEq tablet TAKE 1 TABLET BY MOUTH EVERY DAY 90 tablet 0   ? nystatin (MYCOSTATIN) 100,000 unit/mL suspension Take 500,000 Units by mouth as needed.     ? hydrOXYzine HCL (ATARAX) 10 MG tablet Take 10 mg by mouth 3 (three) times a day as needed for itching.     ? levothyroxine (SYNTHROID, LEVOTHROID) 50 MCG tablet Take 50 mcg by mouth daily.       No current facility-administered medications for this visit.        Objective:     BP (!) 132/94 (Patient Site: Left Arm, Patient Position: Sitting, Cuff Size: Adult Large)   Pulse 80   Resp 16   Ht 5' 9.69\" (1.77 m)   Wt (!) 236 lb 9.6 oz (107.3 kg)   BMI 34.25 kg/m    (!) 236 lb 9.6 oz (107.3 kg)   [unfilled]  Wt Readings from Last 3 Encounters:   01/08/21 (!) 236 lb 9.6 oz (107.3 kg)   07/26/19 (!) 238 lb " (108 kg)   18 (!) 232 lb (105.2 kg)     122/86 left arm seated  Physical Exam:    GENERAL APPEARANCE: alert, no apparent distress  HEENT: no scleral icterus or xanthelasma  NECK: jugular venous pressure within normal limits  CHEST: symmetric, the lungs are clear to auscultation  CARDIOVASCULAR: regular rhythm without murmur, click, or gallop; no carotid bruits        EXTREMITIES: no cyanosis, clubbing or edema    Cardiac Testing:  Echo Stress Exercise  Order# [id number]  Reading physician: Shay Garcia MD Ordering physician: Ever Cooper MD Study date: 6/15/18   Performing Date Performing Department   Servando 15, 2018  CARDIAC TESTING [[id number]]   Patient Information    Patient Name   Esmer Smith MRN   [id number] Sex   Female  1   1970 (47 y.o.)   EKG Scan     Stress Test Data - Scan on 6/15/18 2:10 PM by    Indications    Chest discomfort   HTN (hypertension)   Interpretation Summary      The patient exercised for 7 minutes and 43 seconds under a Royal protocol.  This represents an average exercise capacity for age and gender.    There is a normal heart rate and blood pressure response to exercise with no reported exertional symptoms.    The stress electrocardiogram is negative for inducible ischemic changes    Baseline screening echocardiogram reveals normal biventricular size and systolic function with an LVEF is 60% and no hemodynamically significant valvular disease.    Stress echocardiogram does not reveal any inducible ischemia.    Overall the sensitivity of these findings are mildly reduced due to relatively poor acoustic windows from patient's body habitus.    On direct comparison to the prior study dated 2012, there has been no significant change.   Technical Details      MUSE DIAGNOSIS  ECG Clinic - Today  Collected: [id number]   Result status: Final   Resulting lab: HE MUSE   Value: Normal sinus rhythm   Normal ECG   No previous ECGs available   Confirmed by HARVINDER   DEVAN HAMPTON LOC:LEAH ([id number]) on 7/27/2017 4:51:31 PM    ECG, normal sinus rhythm, normal resting EKG    Lab Results:    Lab Results   Component Value Date     09/25/2018    K 3.9 09/25/2018     (H) 09/25/2018    CO2 26 09/25/2018    BUN 9 09/25/2018    CREATININE 0.67 09/25/2018    CALCIUM 9.0 09/25/2018     Lab Results   Component Value Date    CHOL 193 09/25/2018    TRIG 54 09/25/2018    HDL 53 09/25/2018    LDLDIRECT 111 12/07/2015     BNP (pg/mL)   Date Value   10/10/2012 <10     Creatinine (mg/dL)   Date Value   09/25/2018 0.67   07/27/2017 0.71   05/27/2016 0.66   12/07/2015 0.68     LDL Calculated (mg/dL)   Date Value   09/25/2018 129     Lab Results   Component Value Date    HGB 13.1 01/02/2011         This note has been dictated using voice recognition software. Any grammatical or context distortions are unintentional and inherent to the software.

## 2021-07-08 ENCOUNTER — LAB VISIT (OUTPATIENT)
Dept: LAB | Facility: HOSPITAL | Age: 51
End: 2021-07-08
Attending: STUDENT IN AN ORGANIZED HEALTH CARE EDUCATION/TRAINING PROGRAM
Payer: COMMERCIAL

## 2021-07-08 DIAGNOSIS — E03.9 HYPOTHYROIDISM, UNSPECIFIED TYPE: ICD-10-CM

## 2021-07-08 LAB — TSH SERPL DL<=0.005 MIU/L-ACNC: 1.09 UIU/ML (ref 0.4–4)

## 2021-07-08 PROCEDURE — 36415 COLL VENOUS BLD VENIPUNCTURE: CPT | Mod: PO | Performed by: STUDENT IN AN ORGANIZED HEALTH CARE EDUCATION/TRAINING PROGRAM

## 2021-07-08 PROCEDURE — 84443 ASSAY THYROID STIM HORMONE: CPT | Mod: PO | Performed by: STUDENT IN AN ORGANIZED HEALTH CARE EDUCATION/TRAINING PROGRAM

## 2021-10-13 ENCOUNTER — PATIENT OUTREACH (OUTPATIENT)
Dept: ADMINISTRATIVE | Facility: OTHER | Age: 51
End: 2021-10-13

## 2021-10-14 ENCOUNTER — HOSPITAL ENCOUNTER (OUTPATIENT)
Dept: RADIOLOGY | Facility: HOSPITAL | Age: 51
Discharge: HOME | End: 2021-10-14
Attending: PHYSICAL MEDICINE & REHABILITATION
Payer: COMMERCIAL

## 2021-10-14 ENCOUNTER — OFFICE VISIT (OUTPATIENT)
Dept: PAIN MEDICINE | Facility: CLINIC | Age: 51
End: 2021-10-14
Payer: COMMERCIAL

## 2021-10-14 VITALS — DIASTOLIC BLOOD PRESSURE: 83 MMHG | HEART RATE: 75 BPM | SYSTOLIC BLOOD PRESSURE: 138 MMHG

## 2021-10-14 DIAGNOSIS — M54.16 LUMBAR RADICULOPATHY: ICD-10-CM

## 2021-10-14 DIAGNOSIS — G89.29 CHRONIC RIGHT-SIDED LOW BACK PAIN WITH RIGHT-SIDED SCIATICA: Primary | ICD-10-CM

## 2021-10-14 DIAGNOSIS — M53.3 SACROILIAC JOINT DYSFUNCTION: ICD-10-CM

## 2021-10-14 DIAGNOSIS — G89.29 CHRONIC RIGHT-SIDED LOW BACK PAIN WITH RIGHT-SIDED SCIATICA: ICD-10-CM

## 2021-10-14 DIAGNOSIS — M54.41 CHRONIC RIGHT-SIDED LOW BACK PAIN WITH RIGHT-SIDED SCIATICA: Primary | ICD-10-CM

## 2021-10-14 DIAGNOSIS — M54.41 CHRONIC RIGHT-SIDED LOW BACK PAIN WITH RIGHT-SIDED SCIATICA: ICD-10-CM

## 2021-10-14 PROCEDURE — 72100 X-RAY EXAM L-S SPINE 2/3 VWS: CPT | Mod: 26,,, | Performed by: RADIOLOGY

## 2021-10-14 PROCEDURE — 99204 OFFICE O/P NEW MOD 45 MIN: CPT | Mod: S$GLB,,, | Performed by: PHYSICAL MEDICINE & REHABILITATION

## 2021-10-14 PROCEDURE — 4010F ACE/ARB THERAPY RXD/TAKEN: CPT | Mod: CPTII,S$GLB,, | Performed by: PHYSICAL MEDICINE & REHABILITATION

## 2021-10-14 PROCEDURE — 4010F PR ACE/ARB THEARPY RXD/TAKEN: ICD-10-PCS | Mod: CPTII,S$GLB,, | Performed by: PHYSICAL MEDICINE & REHABILITATION

## 2021-10-14 PROCEDURE — 99204 PR OFFICE/OUTPT VISIT, NEW, LEVL IV, 45-59 MIN: ICD-10-PCS | Mod: S$GLB,,, | Performed by: PHYSICAL MEDICINE & REHABILITATION

## 2021-10-14 PROCEDURE — 3079F DIAST BP 80-89 MM HG: CPT | Mod: CPTII,S$GLB,, | Performed by: PHYSICAL MEDICINE & REHABILITATION

## 2021-10-14 PROCEDURE — 72100 XR LUMBAR SPINE AP AND LATERAL: ICD-10-PCS | Mod: 26,,, | Performed by: RADIOLOGY

## 2021-10-14 PROCEDURE — 99999 PR PBB SHADOW E&M-EST. PATIENT-LVL III: CPT | Mod: PBBFAC,,, | Performed by: PHYSICAL MEDICINE & REHABILITATION

## 2021-10-14 PROCEDURE — 3079F PR MOST RECENT DIASTOLIC BLOOD PRESSURE 80-89 MM HG: ICD-10-PCS | Mod: CPTII,S$GLB,, | Performed by: PHYSICAL MEDICINE & REHABILITATION

## 2021-10-14 PROCEDURE — 72100 X-RAY EXAM L-S SPINE 2/3 VWS: CPT | Mod: TC,FY

## 2021-10-14 PROCEDURE — 1160F RVW MEDS BY RX/DR IN RCRD: CPT | Mod: CPTII,S$GLB,, | Performed by: PHYSICAL MEDICINE & REHABILITATION

## 2021-10-14 PROCEDURE — 99999 PR PBB SHADOW E&M-EST. PATIENT-LVL III: ICD-10-PCS | Mod: PBBFAC,,, | Performed by: PHYSICAL MEDICINE & REHABILITATION

## 2021-10-14 PROCEDURE — 1160F PR REVIEW ALL MEDS BY PRESCRIBER/CLIN PHARMACIST DOCUMENTED: ICD-10-PCS | Mod: CPTII,S$GLB,, | Performed by: PHYSICAL MEDICINE & REHABILITATION

## 2021-10-14 PROCEDURE — 3075F PR MOST RECENT SYSTOLIC BLOOD PRESS GE 130-139MM HG: ICD-10-PCS | Mod: CPTII,S$GLB,, | Performed by: PHYSICAL MEDICINE & REHABILITATION

## 2021-10-14 PROCEDURE — 1159F MED LIST DOCD IN RCRD: CPT | Mod: CPTII,S$GLB,, | Performed by: PHYSICAL MEDICINE & REHABILITATION

## 2021-10-14 PROCEDURE — 3075F SYST BP GE 130 - 139MM HG: CPT | Mod: CPTII,S$GLB,, | Performed by: PHYSICAL MEDICINE & REHABILITATION

## 2021-10-14 PROCEDURE — 1159F PR MEDICATION LIST DOCUMENTED IN MEDICAL RECORD: ICD-10-PCS | Mod: CPTII,S$GLB,, | Performed by: PHYSICAL MEDICINE & REHABILITATION

## 2021-10-14 RX ORDER — NAPROXEN 500 MG/1
500 TABLET ORAL 2 TIMES DAILY
Qty: 60 TABLET | Refills: 1 | Status: SHIPPED | OUTPATIENT
Start: 2021-10-14 | End: 2022-08-26

## 2021-10-22 ENCOUNTER — TELEPHONE (OUTPATIENT)
Dept: CARDIOLOGY | Facility: CLINIC | Age: 51
End: 2021-10-22

## 2021-10-22 DIAGNOSIS — I10 ESSENTIAL HYPERTENSION: Primary | ICD-10-CM

## 2021-10-22 RX ORDER — AMLODIPINE BESYLATE 10 MG/1
10 TABLET ORAL DAILY
Qty: 90 TABLET | Refills: 0 | Status: SHIPPED | OUTPATIENT
Start: 2021-10-22 | End: 2021-12-02

## 2021-10-22 RX ORDER — POTASSIUM CHLORIDE 1500 MG/1
20 TABLET, EXTENDED RELEASE ORAL DAILY
COMMUNITY
Start: 2021-06-11 | End: 2021-10-22

## 2021-10-22 RX ORDER — HYDROCHLOROTHIAZIDE 12.5 MG/1
12.5 CAPSULE ORAL DAILY
Qty: 90 CAPSULE | Refills: 0 | Status: SHIPPED | OUTPATIENT
Start: 2021-10-22 | End: 2021-12-02

## 2021-10-22 RX ORDER — POTASSIUM CHLORIDE 1500 MG/1
20 TABLET, EXTENDED RELEASE ORAL DAILY
Qty: 90 TABLET | Refills: 0 | Status: SHIPPED | OUTPATIENT
Start: 2021-10-22 | End: 2021-12-02

## 2021-10-22 NOTE — TELEPHONE ENCOUNTER
90 day supply sent, no refills.  -[facility]      ----- Message from Libra Rubin sent at 10/22/2021 10:35 AM CDT -----  Regarding: MD[facility] pt  Patient has already contacted their pharmacy. The medication or refill issue is below:    Ordering Cardiologist: MDG  Medication: all of them   Issue / Concern: She set up an appt with MDG   Preferred Pharmacy/City: [facility] in [facility] Phone Number for Patient: [phone]     Additional Info:

## 2021-10-26 ENCOUNTER — CLINICAL SUPPORT (OUTPATIENT)
Dept: REHABILITATION | Facility: HOSPITAL | Age: 51
End: 2021-10-26
Attending: PHYSICAL MEDICINE & REHABILITATION
Payer: COMMERCIAL

## 2021-10-26 DIAGNOSIS — M54.41 CHRONIC RIGHT-SIDED LOW BACK PAIN WITH RIGHT-SIDED SCIATICA: ICD-10-CM

## 2021-10-26 DIAGNOSIS — M54.16 LUMBAR RADICULOPATHY: ICD-10-CM

## 2021-10-26 DIAGNOSIS — G89.29 CHRONIC RIGHT-SIDED LOW BACK PAIN WITH RIGHT-SIDED SCIATICA: ICD-10-CM

## 2021-10-26 DIAGNOSIS — M53.3 SACROILIAC JOINT DYSFUNCTION: ICD-10-CM

## 2021-10-26 DIAGNOSIS — R29.898 WEAKNESS OF RIGHT LOWER EXTREMITY: ICD-10-CM

## 2021-10-26 DIAGNOSIS — M53.86 DECREASED ROM OF LUMBAR SPINE: ICD-10-CM

## 2021-10-26 PROCEDURE — 97161 PT EVAL LOW COMPLEX 20 MIN: CPT | Mod: PO | Performed by: PHYSICAL THERAPIST

## 2021-10-26 PROCEDURE — 97110 THERAPEUTIC EXERCISES: CPT | Mod: PO | Performed by: PHYSICAL THERAPIST

## 2021-10-27 PROBLEM — R29.898 LOWER EXTREMITY WEAKNESS: Status: ACTIVE | Noted: 2021-10-27

## 2021-10-27 PROBLEM — M53.86 DECREASED ROM OF LUMBAR SPINE: Status: ACTIVE | Noted: 2021-10-27

## 2021-11-22 ENCOUNTER — LAB VISIT (OUTPATIENT)
Dept: LAB | Facility: HOSPITAL | Age: 51
End: 2021-11-22
Attending: STUDENT IN AN ORGANIZED HEALTH CARE EDUCATION/TRAINING PROGRAM
Payer: COMMERCIAL

## 2021-11-22 DIAGNOSIS — E03.9 HYPOTHYROIDISM, UNSPECIFIED TYPE: ICD-10-CM

## 2021-11-22 DIAGNOSIS — I10 ESSENTIAL HYPERTENSION: ICD-10-CM

## 2021-11-22 LAB
ALBUMIN SERPL BCP-MCNC: 4.2 G/DL (ref 3.5–5.2)
ALP SERPL-CCNC: 65 U/L (ref 38–126)
ALT SERPL W/O P-5'-P-CCNC: 20 U/L (ref 10–44)
ANION GAP SERPL CALC-SCNC: 11 MMOL/L (ref 8–16)
AST SERPL-CCNC: 26 U/L (ref 15–46)
BASOPHILS # BLD AUTO: 0.03 K/UL (ref 0–0.2)
BASOPHILS NFR BLD: 0.6 % (ref 0–1.9)
BILIRUB SERPL-MCNC: 0.7 MG/DL (ref 0.1–1)
CALCIUM SERPL-MCNC: 9.4 MG/DL (ref 8.7–10.5)
CHLORIDE SERPL-SCNC: 108 MMOL/L (ref 95–110)
CO2 SERPL-SCNC: 23 MMOL/L (ref 23–29)
CREAT SERPL-MCNC: 0.74 MG/DL (ref 0.5–1.4)
DIFFERENTIAL METHOD: NORMAL
EOSINOPHIL # BLD AUTO: 0.1 K/UL (ref 0–0.5)
EOSINOPHIL NFR BLD: 2.1 % (ref 0–8)
ERYTHROCYTE [DISTWIDTH] IN BLOOD BY AUTOMATED COUNT: 12.4 % (ref 11.5–14.5)
EST. GFR  (AFRICAN AMERICAN): >60 ML/MIN/1.73 M^2
EST. GFR  (NON AFRICAN AMERICAN): >60 ML/MIN/1.73 M^2
GLUCOSE SERPL-MCNC: 88 MG/DL (ref 70–110)
HCT VFR BLD AUTO: 40.3 % (ref 37–48.5)
HGB BLD-MCNC: 13.3 G/DL (ref 12–16)
IMM GRANULOCYTES # BLD AUTO: 0.01 K/UL (ref 0–0.04)
IMM GRANULOCYTES NFR BLD AUTO: 0.2 % (ref 0–0.5)
LYMPHOCYTES # BLD AUTO: 1.9 K/UL (ref 1–4.8)
LYMPHOCYTES NFR BLD: 36.5 % (ref 18–48)
MCH RBC QN AUTO: 30.1 PG (ref 27–31)
MCHC RBC AUTO-ENTMCNC: 33 G/DL (ref 32–36)
MCV RBC AUTO: 91 FL (ref 82–98)
MONOCYTES # BLD AUTO: 0.4 K/UL (ref 0.3–1)
MONOCYTES NFR BLD: 6.8 % (ref 4–15)
NEUTROPHILS # BLD AUTO: 2.9 K/UL (ref 1.8–7.7)
NEUTROPHILS NFR BLD: 53.8 % (ref 38–73)
NRBC BLD-RTO: 0 /100 WBC
PLATELET # BLD AUTO: 196 K/UL (ref 150–450)
PMV BLD AUTO: 12.3 FL (ref 9.2–12.9)
POTASSIUM SERPL-SCNC: 4.8 MMOL/L (ref 3.5–5.1)
PROT SERPL-MCNC: 7 G/DL (ref 6–8.4)
RBC # BLD AUTO: 4.42 M/UL (ref 4–5.4)
SODIUM SERPL-SCNC: 142 MMOL/L (ref 136–145)
TSH SERPL DL<=0.005 MIU/L-ACNC: 0.76 UIU/ML (ref 0.4–4)
UUN UR-MCNC: 13 MG/DL (ref 7–17)
WBC # BLD AUTO: 5.32 K/UL (ref 3.9–12.7)

## 2021-11-22 PROCEDURE — 36415 COLL VENOUS BLD VENIPUNCTURE: CPT | Mod: PO | Performed by: STUDENT IN AN ORGANIZED HEALTH CARE EDUCATION/TRAINING PROGRAM

## 2021-11-22 PROCEDURE — 84443 ASSAY THYROID STIM HORMONE: CPT | Mod: PO | Performed by: STUDENT IN AN ORGANIZED HEALTH CARE EDUCATION/TRAINING PROGRAM

## 2021-11-22 PROCEDURE — 80053 COMPREHEN METABOLIC PANEL: CPT | Mod: PO | Performed by: STUDENT IN AN ORGANIZED HEALTH CARE EDUCATION/TRAINING PROGRAM

## 2021-11-22 PROCEDURE — 85025 COMPLETE CBC W/AUTO DIFF WBC: CPT | Mod: PO | Performed by: STUDENT IN AN ORGANIZED HEALTH CARE EDUCATION/TRAINING PROGRAM

## 2021-11-27 ENCOUNTER — OFFICE VISIT (OUTPATIENT)
Dept: URGENT CARE | Facility: URGENT CARE | Age: 51
End: 2021-11-27
Payer: COMMERCIAL

## 2021-11-27 VITALS
HEART RATE: 77 BPM | OXYGEN SATURATION: 98 % | TEMPERATURE: 97.8 F | SYSTOLIC BLOOD PRESSURE: 133 MMHG | BODY MASS INDEX: 34.85 KG/M2 | WEIGHT: 236 LBS | DIASTOLIC BLOOD PRESSURE: 83 MMHG

## 2021-11-27 DIAGNOSIS — S29.011A MUSCLE STRAIN OF CHEST WALL, INITIAL ENCOUNTER: Primary | ICD-10-CM

## 2021-11-27 DIAGNOSIS — R07.82 INTERCOSTAL PAIN: ICD-10-CM

## 2021-11-27 PROCEDURE — 93000 ELECTROCARDIOGRAM COMPLETE: CPT

## 2021-11-27 PROCEDURE — 99213 OFFICE O/P EST LOW 20 MIN: CPT

## 2021-11-27 RX ORDER — HYDROXYZINE HYDROCHLORIDE 10 MG/1
10-20 TABLET, FILM COATED ORAL
COMMUNITY
Start: 2020-03-20

## 2021-11-27 RX ORDER — CYCLOBENZAPRINE HCL 5 MG
5-10 TABLET ORAL 3 TIMES DAILY PRN
Qty: 20 TABLET | Refills: 0 | Status: SHIPPED | OUTPATIENT
Start: 2021-11-27

## 2021-11-27 NOTE — PATIENT INSTRUCTIONS
Tylenol 1,000 mg every 6-8 hours  Flexeril: 1-2 pills three times a day as needed  Ibuprofen: 400 every 6 hours as needed    Concerns for heart attack: worsened with activity that makes your heart beat faster/work harder (stairs, etc.), nausea, dizziness

## 2021-11-27 NOTE — PROGRESS NOTES
"URGENT CARE NOTE    Assessment/Plan:    Chest pain  Muscle strain of chest wall, initial encounter  Concerning that is on the left chest radiating down the left arm, but history of increased physical activity fracture and pain is very clearly exacerbated with palpation of left chest and by arm flexion, extension.  EKG benign.  Patient does have multiple risk factors for cardiac disease but is reassuring that she had a normal stress echo 3 years ago and the fact that it is very clearly exacerbated by movement of chest and not exacerbated by activity.  Recommended continue Tylenol and ibuprofen at home, will add Flexeril.  Discussed criteria for going to emergency department.  Already has follow-up with cardiology next Thursday.  - EKG 12-lead complete w/read - Clinics  - cyclobenzaprine (FLEXERIL) 5 MG tablet  Dispense: 20 tablet; Refill: 0      Yaima Burger MD  PGY3, Family Medicine      Review of the result(s) of each unique test - stress echo  Prescription drug management       Esmer Smith is a 51 year old female with a PMH of   Patient Active Problem List   Diagnosis     Atypical Chest Pain     Hypertension     Obesity (BMI 30.0-34.9)     Hypertension     Uterine fibroid     S/P hysterectomy     presenting to clinic today with a chief complaint of:    Patient presents with:  Urgent Care  Chest Pain: c/o chest pain for 1 day    Started hurting yesterday morning but got bad last night. Radiates down left arm.     A couple years ago went to ER for the chest pain and they told her that she had inflammation around the \"walls of the heart\". Amlodipine and hydrochlorothiazide potassium.     Nauseous but hasn't eaten and sometimes that happens. No dizziness.     Most uncomfortable when lies down. Hurt the most when is lying down. Walking around makes it better.     Father has had a couple strokes. Maternal grandmother passed of a heart attach. 7 uncles and three aunts and 5 of the uncles and 2 of the " aunts have had heart attacks.     Doesn't smoke.     Tried some tylenol last night, didn't help.     Moved furniture around her house over the past two days. Hurts when moving her arms/stretching. Negative stress Echo in 2018.     Current Outpatient Medications   Medication Sig Dispense Refill     amLODIPine (NORVASC) 10 MG tablet Take 1 tablet (10 mg) by mouth daily 90 tablet 0     cyclobenzaprine (FLEXERIL) 5 MG tablet Take 1-2 tablets (5-10 mg) by mouth 3 times daily as needed for muscle spasms 20 tablet 0     hydrochlorothiazide (MICROZIDE) 12.5 MG capsule Take 1 capsule (12.5 mg) by mouth daily 90 capsule 0     hydrOXYzine (ATARAX) 10 MG tablet Take 10-20 mg by mouth       KLOR-CON 20 MEQ CR tablet Take 1 tablet (20 mEq) by mouth daily 90 tablet 0       O: /83   Pulse 77   Temp 97.8  F (36.6  C) (Tympanic)   Wt 107 kg (236 lb)   SpO2 98%   BMI 34.85 kg/m     Gen:  Well nourished and in no acute distress   HEENT: PERRL;  nasopharynx pink and moist; oropharynx pink and moist  Neck: supple without lymphadenopathy  CV:  RRR  - no murmurs noted.   Chest: pain with palpation of left chest wall, pain with arm extension and abduction.  Pulm:  CTAB, no wheezes or crackles noted, good air entry   Psych: Euthymic     Your most recent lab results (some earlier results may not be listed):  No results found for any visits on 11/27/21.    This note was created using Dragon dictation system. Typos are not intentional.

## 2021-12-02 ENCOUNTER — OFFICE VISIT (OUTPATIENT)
Dept: CARDIOLOGY | Facility: CLINIC | Age: 51
End: 2021-12-02
Payer: COMMERCIAL

## 2021-12-02 VITALS
SYSTOLIC BLOOD PRESSURE: 106 MMHG | RESPIRATION RATE: 16 BRPM | DIASTOLIC BLOOD PRESSURE: 84 MMHG | OXYGEN SATURATION: 100 % | BODY MASS INDEX: 35 KG/M2 | WEIGHT: 237 LBS | HEART RATE: 72 BPM

## 2021-12-02 DIAGNOSIS — R07.2 PRECORDIAL PAIN: Primary | ICD-10-CM

## 2021-12-02 DIAGNOSIS — I10 ESSENTIAL HYPERTENSION: ICD-10-CM

## 2021-12-02 LAB
ATRIAL RATE - MUSE: 68 BPM
DIASTOLIC BLOOD PRESSURE - MUSE: NORMAL MMHG
ERYTHROCYTE [SEDIMENTATION RATE] IN BLOOD BY WESTERGREN METHOD: 16 MM/HR (ref 0–20)
INTERPRETATION ECG - MUSE: NORMAL
P AXIS - MUSE: 43 DEGREES
PR INTERVAL - MUSE: 164 MS
QRS DURATION - MUSE: 86 MS
QT - MUSE: 428 MS
QTC - MUSE: 455 MS
R AXIS - MUSE: 15 DEGREES
SYSTOLIC BLOOD PRESSURE - MUSE: NORMAL MMHG
T AXIS - MUSE: 25 DEGREES
TROPONIN I SERPL-MCNC: <0.01 NG/ML (ref 0–0.29)
VENTRICULAR RATE- MUSE: 68 BPM

## 2021-12-02 PROCEDURE — 84484 ASSAY OF TROPONIN QUANT: CPT | Performed by: INTERNAL MEDICINE

## 2021-12-02 PROCEDURE — 36415 COLL VENOUS BLD VENIPUNCTURE: CPT | Performed by: INTERNAL MEDICINE

## 2021-12-02 PROCEDURE — 99214 OFFICE O/P EST MOD 30 MIN: CPT | Performed by: INTERNAL MEDICINE

## 2021-12-02 PROCEDURE — 85652 RBC SED RATE AUTOMATED: CPT | Performed by: INTERNAL MEDICINE

## 2021-12-02 PROCEDURE — 93000 ELECTROCARDIOGRAM COMPLETE: CPT | Performed by: INTERNAL MEDICINE

## 2021-12-02 RX ORDER — AMLODIPINE BESYLATE 10 MG/1
10 TABLET ORAL DAILY
Qty: 90 TABLET | Refills: 4 | Status: SHIPPED | OUTPATIENT
Start: 2021-12-02 | End: 2023-01-11

## 2021-12-02 RX ORDER — POTASSIUM CHLORIDE 1500 MG/1
20 TABLET, EXTENDED RELEASE ORAL DAILY
Qty: 90 TABLET | Refills: 4 | Status: SHIPPED | OUTPATIENT
Start: 2021-12-02 | End: 2023-01-11

## 2021-12-02 RX ORDER — ACETAMINOPHEN 325 MG/1
650 TABLET ORAL PRN
COMMUNITY

## 2021-12-02 RX ORDER — HYDROCHLOROTHIAZIDE 12.5 MG/1
12.5 CAPSULE ORAL DAILY
Qty: 90 CAPSULE | Refills: 4 | Status: SHIPPED | OUTPATIENT
Start: 2021-12-02 | End: 2023-06-20

## 2021-12-02 NOTE — LETTER
12/2/2021    Physician No Ref-Primary  No address on file    RE: Esmer Smith       Dear Colleague,    I had the pleasure of seeing Esmer Smith in the [facility].      HEART CARE ENCOUNTER CONSULTATON NOTE      [facility]  [phone]      Assessment/Recommendations   Assessment/Plan:  1.  Chest discomfort.  This sounds most consistent with costochondritis.  She has tender between her anterior ribs on the left side.  She does have some risk factors for coronary artery disease with negative work-up in the past.  EKG today reveals normal sinus rhythm normal EKG.  Plan for exercise stress echocardiogram given some dyspnea on exertion.  In addition a troponin and a sed rate was ordered as well.  No rub identified on examination.    2.  Hypertension.  Blood pressure appears to be under good control with the current combination of medications.  She had a sodium of 136 and a potassium of 3.6 May 6, 2021 as well as a hemoglobin A1c through her primary care provider of 5.8.    3.  Mild dyslipidemia.  7 months ago cholesterol was 223, HDL 59, with laboratory studies January 2021 revealing an LDL of 118.        1.  Ongoing treatment of costochondritis.  We talked about use of ibuprofen and the potential for GI irritation.  She was also given Flexeril by her primary care provider.  2.  Ongoing risk modification.  3.  Exercise stress echocardiogram with laboratory studies as planned.  4.  Follow-up in 6 months or sooner if specific issues were to arise.  Further recommendations pending the outcome of the stress test and laboratory studies.       History of Present Illness/Subjective    HPI: Esmer Smith is a 51 year old female who is seen in follow-up today.  She recently saw her primary care provider for chest discomfort.  She describes the chest discomfort as sharp and reproducible with palpation and movement.  She had an EKG through her  primary care provider on November 27 which I reviewed and was within normal limits.  She reports that this has been present for the past 3 weeks.  She cannot recall a specific injury but she does wrestle for fun with her son and wonders if she had twisted in the wrong way during this activity.  She has not monitored her blood pressure regularly but blood pressure today looks very good.  She has been tolerating the current combination of medications.  She has a history of hypertension and mild hyperlipidemia.  She does endorses some dyspnea on exertion which has been a little bit more pronounced over the past number of months.  She had a negative stress echocardiogram at [facility] in 2018.  She had a coronary CT angiogram in 2012 after an equivocal stress test at that time that was reported negative for coronary artery disease with a calcium score of 0.    ECG today normal sinus rhythm, normal EKG.    Recent Echocardiogram Results:  Echocardiogram Exercise Stress  Order: [id number]   Status: Final result     Visible to patient: No (inaccessible in MyChart)     Next appt: 12/02/2021 at 07:50 AM in Cardiology (Ever Cooper MD)     Dx: Chest discomfort; HTN (hypertension)     1 Result Note    Details    Reading Physician Reading Date Result Priority   Shay Garcia MD  [phone] 6/15/2018 Routine   Provider, Historical 6/15/2018      Narrative & Impression    The patient exercised for 7 minutes and 43 seconds under a Royal protocol.  This represents an average exercise capacity for age and gender.    There is a normal heart rate and blood pressure response to exercise with no reported exertional symptoms.    The stress electrocardiogram is negative for inducible ischemic changes    Baseline screening echocardiogram reveals normal biventricular size and systolic function with an LVEF is 60% and no hemodynamically significant valvular disease.    Stress echocardiogram does not reveal any inducible ischemia.     Overall the sensitivity of these findings are mildly reduced due to relatively poor acoustic windows from patient's body habitus.    On direct comparison to the prior study dated 12/1/2012, there has been no significant change         Recent Coronary Angiogram Results:         Physical Examination  Review of Systems   Vitals: 106/84, repeat 108/84, heart rate of 70s and regular, respiratory rate 16, weight 107.5 kg.  Wt Readings from Last 3 Encounters:   11/27/21 107 kg (236 lb)   05/12/21 107 kg (236 lb)   01/08/21 107.3 kg (236 lb 9.6 oz)       General Appearance:   no distress, normal body habitus   ENT/Mouth: membranes moist, no oral lesions or bleeding gums.      EYES:  no scleral icterus, normal conjunctivae   Neck: no carotid bruits or thyromegaly   Chest/Lungs:   lungs are clear to auscultation, no rales or wheezing, equal chest wall expansion, chest wall is tender to palpation and does reproduce the discomfort.   Cardiovascular:   Regular. Normal first and second heart sounds with no murmurs, rubs, or gallops; the carotid, radial and posterior tibial pulses are intact, Jugular venous pressure within normal limits, no significant edema bilaterally    Abdomen:  no organomegaly, masses, bruits, or tenderness; bowel sounds are present   Extremities: no cyanosis or clubbing   Skin: no xanthelasma, warm.    Neurologic: normal  bilateral, no tremors     Psychiatric: alert and oriented x3, calm        Please refer above for cardiac ROS details.        Medical History  Surgical History Family History Social History   Past Medical History:   Diagnosis Date     Hypertension      Past Surgical History:   Procedure Laterality Date     GYN SURGERY       Family History   Problem Relation Age of Onset     Cerebrovascular Disease Maternal Grandmother         Social History     Socioeconomic History     Marital status:      Spouse name: Not on file     Number of children: Not on file     Years of education: Not  on file     Highest education level: Not on file   Occupational History     Not on file   Tobacco Use     Smoking status: Never Smoker     Smokeless tobacco: Never Used   Substance and Sexual Activity     Alcohol use: Not on file     Drug use: Not on file     Sexual activity: Not on file   Other Topics Concern     Parent/sibling w/ CABG, MI or angioplasty before 65F 55M? Not Asked   Social History Narrative     Not on file     Social Determinants of Health     Financial Resource Strain: Not on file   Food Insecurity: Not on file   Transportation Needs: Not on file   Physical Activity: Not on file   Stress: Not on file   Social Connections: Not on file   Intimate Partner Violence: Not on file   Housing Stability: Not on file           Medications  Allergies   Current Outpatient Medications   Medication Sig Dispense Refill     amLODIPine (NORVASC) 10 MG tablet Take 1 tablet (10 mg) by mouth daily 90 tablet 0     cyclobenzaprine (FLEXERIL) 5 MG tablet Take 1-2 tablets (5-10 mg) by mouth 3 times daily as needed for muscle spasms 20 tablet 0     hydrochlorothiazide (MICROZIDE) 12.5 MG capsule Take 1 capsule (12.5 mg) by mouth daily 90 capsule 0     hydrOXYzine (ATARAX) 10 MG tablet Take 10-20 mg by mouth       KLOR-CON 20 MEQ CR tablet Take 1 tablet (20 mEq) by mouth daily 90 tablet 0       Allergies   Allergen Reactions     Chocolate Unknown     itching          Lab Results    Chemistry/lipid CBC Cardiac Enzymes/BNP/TSH/INR   Recent Labs   Lab Test 01/08/21  0938   CHOL 187   HDL 53      TRIG 78     Recent Labs   Lab Test 01/08/21  0938 09/25/18  0826 12/07/15  1704    129 111     Recent Labs   Lab Test 01/08/21  0938      POTASSIUM 4.0   CHLORIDE 106   CO2 25   GLC 90   BUN 11   CR 0.65   GFRESTIMATED >60   VAN 8.6     Recent Labs   Lab Test 01/08/21  0938 09/25/18  0826   CR 0.65 0.67     No results for input(s): A1C in the last 30664 hours.       No results for input(s): WBC, HGB, HCT, MCV, PLT in  the last 78961 hours.  No results for input(s): HGB in the last 36772 hours. No results for input(s): TROPONINI in the last 43702 hours.  No results for input(s): BNP, NTBNPI, NTBNP in the last 76118 hours.  Recent Labs   Lab Test 01/08/21  0938   TSH 3.25     No results for input(s): INR in the last 17953 hours.     Ever Cooper MD                                        Thank you for allowing me to participate in the care of your patient.      Sincerely,     Ever Cooper MD     [facility]  cc:   Ever Cooper MD  [address]  [place] [address]

## 2021-12-02 NOTE — PROGRESS NOTES
HEART CARE ENCOUNTER CONSULTATON NOTE      [facility]  [phone]      Assessment/Recommendations   Assessment/Plan:  1.  Chest discomfort.  This sounds most consistent with costochondritis.  She has tender between her anterior ribs on the left side.  She does have some risk factors for coronary artery disease with negative work-up in the past.  EKG today reveals normal sinus rhythm normal EKG.  Plan for exercise stress echocardiogram given some dyspnea on exertion.  In addition a troponin and a sed rate was ordered as well.  No rub identified on examination.    2.  Hypertension.  Blood pressure appears to be under good control with the current combination of medications.  She had a sodium of 136 and a potassium of 3.6 May 6, 2021 as well as a hemoglobin A1c through her primary care provider of 5.8.    3.  Mild dyslipidemia.  7 months ago cholesterol was 223, HDL 59, with laboratory studies January 2021 revealing an LDL of 118.        1.  Ongoing treatment of costochondritis.  We talked about use of ibuprofen and the potential for GI irritation.  She was also given Flexeril by her primary care provider.  2.  Ongoing risk modification.  3.  Exercise stress echocardiogram with laboratory studies as planned.  4.  Follow-up in 6 months or sooner if specific issues were to arise.  Further recommendations pending the outcome of the stress test and laboratory studies.       History of Present Illness/Subjective    HPI: Esmer Smith is a 51 year old female who is seen in follow-up today.  She recently saw her primary care provider for chest discomfort.  She describes the chest discomfort as sharp and reproducible with palpation and movement.  She had an EKG through her primary care provider on November 27 which I reviewed and was within normal limits.  She reports that this has been present for the past 3 weeks.  She cannot recall a specific injury but she does wrestle for fun with her son and  wonders if she had twisted in the wrong way during this activity.  She has not monitored her blood pressure regularly but blood pressure today looks very good.  She has been tolerating the current combination of medications.  She has a history of hypertension and mild hyperlipidemia.  She does endorses some dyspnea on exertion which has been a little bit more pronounced over the past number of months.  She had a negative stress echocardiogram at [facility] in 2018.  She had a coronary CT angiogram in 2012 after an equivocal stress test at that time that was reported negative for coronary artery disease with a calcium score of 0.    ECG today normal sinus rhythm, normal EKG.    Recent Echocardiogram Results:  Echocardiogram Exercise Stress  Order: [id number]   Status: Final result     Visible to patient: No (inaccessible in MyChart)     Next appt: 12/02/2021 at 07:50 AM in Cardiology (Ever Cooper MD)     Dx: Chest discomfort; HTN (hypertension)     1 Result Note    Details    Reading Physician Reading Date Result Priority   Shay Garcia MD  [phone] 6/15/2018 Routine   Provider, Historical 6/15/2018      Narrative & Impression    The patient exercised for 7 minutes and 43 seconds under a Royal protocol.  This represents an average exercise capacity for age and gender.    There is a normal heart rate and blood pressure response to exercise with no reported exertional symptoms.    The stress electrocardiogram is negative for inducible ischemic changes    Baseline screening echocardiogram reveals normal biventricular size and systolic function with an LVEF is 60% and no hemodynamically significant valvular disease.    Stress echocardiogram does not reveal any inducible ischemia.    Overall the sensitivity of these findings are mildly reduced due to relatively poor acoustic windows from patient's body habitus.    On direct comparison to the prior study dated 12/1/2012, there has been no significant change          Recent Coronary Angiogram Results:         Physical Examination  Review of Systems   Vitals: 106/84, repeat 108/84, heart rate of 70s and regular, respiratory rate 16, weight 107.5 kg.  Wt Readings from Last 3 Encounters:   11/27/21 107 kg (236 lb)   05/12/21 107 kg (236 lb)   01/08/21 107.3 kg (236 lb 9.6 oz)       General Appearance:   no distress, normal body habitus   ENT/Mouth: membranes moist, no oral lesions or bleeding gums.      EYES:  no scleral icterus, normal conjunctivae   Neck: no carotid bruits or thyromegaly   Chest/Lungs:   lungs are clear to auscultation, no rales or wheezing, equal chest wall expansion, chest wall is tender to palpation and does reproduce the discomfort.   Cardiovascular:   Regular. Normal first and second heart sounds with no murmurs, rubs, or gallops; the carotid, radial and posterior tibial pulses are intact, Jugular venous pressure within normal limits, no significant edema bilaterally    Abdomen:  no organomegaly, masses, bruits, or tenderness; bowel sounds are present   Extremities: no cyanosis or clubbing   Skin: no xanthelasma, warm.    Neurologic: normal  bilateral, no tremors     Psychiatric: alert and oriented x3, calm        Please refer above for cardiac ROS details.        Medical History  Surgical History Family History Social History   Past Medical History:   Diagnosis Date     Hypertension      Past Surgical History:   Procedure Laterality Date     GYN SURGERY       Family History   Problem Relation Age of Onset     Cerebrovascular Disease Maternal Grandmother         Social History     Socioeconomic History     Marital status:      Spouse name: Not on file     Number of children: Not on file     Years of education: Not on file     Highest education level: Not on file   Occupational History     Not on file   Tobacco Use     Smoking status: Never Smoker     Smokeless tobacco: Never Used   Substance and Sexual Activity     Alcohol use: Not on  file     Drug use: Not on file     Sexual activity: Not on file   Other Topics Concern     Parent/sibling w/ CABG, MI or angioplasty before 65F 55M? Not Asked   Social History Narrative     Not on file     Social Determinants of Health     Financial Resource Strain: Not on file   Food Insecurity: Not on file   Transportation Needs: Not on file   Physical Activity: Not on file   Stress: Not on file   Social Connections: Not on file   Intimate Partner Violence: Not on file   Housing Stability: Not on file           Medications  Allergies   Current Outpatient Medications   Medication Sig Dispense Refill     amLODIPine (NORVASC) 10 MG tablet Take 1 tablet (10 mg) by mouth daily 90 tablet 0     cyclobenzaprine (FLEXERIL) 5 MG tablet Take 1-2 tablets (5-10 mg) by mouth 3 times daily as needed for muscle spasms 20 tablet 0     hydrochlorothiazide (MICROZIDE) 12.5 MG capsule Take 1 capsule (12.5 mg) by mouth daily 90 capsule 0     hydrOXYzine (ATARAX) 10 MG tablet Take 10-20 mg by mouth       KLOR-CON 20 MEQ CR tablet Take 1 tablet (20 mEq) by mouth daily 90 tablet 0       Allergies   Allergen Reactions     Chocolate Unknown     itching          Lab Results    Chemistry/lipid CBC Cardiac Enzymes/BNP/TSH/INR   Recent Labs   Lab Test 01/08/21  0938   CHOL 187   HDL 53      TRIG 78     Recent Labs   Lab Test 01/08/21  0938 09/25/18  0826 12/07/15  1704    129 111     Recent Labs   Lab Test 01/08/21  0938      POTASSIUM 4.0   CHLORIDE 106   CO2 25   GLC 90   BUN 11   CR 0.65   GFRESTIMATED >60   VAN 8.6     Recent Labs   Lab Test 01/08/21  0938 09/25/18  0826   CR 0.65 0.67     No results for input(s): A1C in the last 48681 hours.       No results for input(s): WBC, HGB, HCT, MCV, PLT in the last 62931 hours.  No results for input(s): HGB in the last 38260 hours. No results for input(s): TROPONINI in the last 49753 hours.  No results for input(s): BNP, NTBNPI, NTBNP in the last 80550 hours.  Recent Labs    Lab Test 01/08/21  0938   TSH 3.25     No results for input(s): INR in the last 64370 hours.     Ever Cooper MD

## 2021-12-02 NOTE — LETTER
Date:January 20, 2022      Patient was self referred, no letter generated. Do not send.        [facility] Health Information

## 2021-12-02 NOTE — PATIENT INSTRUCTIONS
We are going to plan a stress echocardiogram in the near future to further evaluate the chest discomfort that you are experiencing.The chest discomfort sounds most consistent with costochronditis which is an inflammation of the connective tissue between.My nurse is Elaine and her number is [phone]

## 2021-12-03 ENCOUNTER — OFFICE VISIT (OUTPATIENT)
Dept: FAMILY MEDICINE | Facility: CLINIC | Age: 51
End: 2021-12-03
Payer: COMMERCIAL

## 2021-12-03 VITALS
SYSTOLIC BLOOD PRESSURE: 130 MMHG | DIASTOLIC BLOOD PRESSURE: 84 MMHG | TEMPERATURE: 99 F | WEIGHT: 182.44 LBS | HEIGHT: 66 IN | BODY MASS INDEX: 29.32 KG/M2

## 2021-12-03 DIAGNOSIS — I10 ESSENTIAL HYPERTENSION: Primary | ICD-10-CM

## 2021-12-03 DIAGNOSIS — E03.9 HYPOTHYROIDISM, UNSPECIFIED TYPE: ICD-10-CM

## 2021-12-03 PROCEDURE — 99212 OFFICE O/P EST SF 10 MIN: CPT | Mod: S$GLB,,, | Performed by: STUDENT IN AN ORGANIZED HEALTH CARE EDUCATION/TRAINING PROGRAM

## 2021-12-03 PROCEDURE — 4010F ACE/ARB THERAPY RXD/TAKEN: CPT | Mod: CPTII,S$GLB,, | Performed by: STUDENT IN AN ORGANIZED HEALTH CARE EDUCATION/TRAINING PROGRAM

## 2021-12-03 PROCEDURE — 4010F PR ACE/ARB THEARPY RXD/TAKEN: ICD-10-PCS | Mod: CPTII,S$GLB,, | Performed by: STUDENT IN AN ORGANIZED HEALTH CARE EDUCATION/TRAINING PROGRAM

## 2021-12-03 PROCEDURE — 99212 PR OFFICE/OUTPT VISIT, EST, LEVL II, 10-19 MIN: ICD-10-PCS | Mod: S$GLB,,, | Performed by: STUDENT IN AN ORGANIZED HEALTH CARE EDUCATION/TRAINING PROGRAM

## 2022-02-23 ENCOUNTER — TELEPHONE (OUTPATIENT)
Dept: OBSTETRICS AND GYNECOLOGY | Facility: CLINIC | Age: 52
End: 2022-02-23
Payer: COMMERCIAL

## 2022-02-23 DIAGNOSIS — Z12.31 ENCOUNTER FOR SCREENING MAMMOGRAM FOR BREAST CANCER: Primary | ICD-10-CM

## 2022-03-02 ENCOUNTER — TELEPHONE (OUTPATIENT)
Dept: FAMILY MEDICINE | Facility: CLINIC | Age: 52
End: 2022-03-02
Payer: COMMERCIAL

## 2022-03-02 DIAGNOSIS — Z12.31 SCREENING MAMMOGRAM FOR HIGH-RISK PATIENT: Primary | ICD-10-CM

## 2022-03-02 NOTE — TELEPHONE ENCOUNTER
----- Message from Bharat Mcpherson MD sent at 12/3/2021  3:35 PM CST -----  Regarding: order mammo  Order mammo

## 2022-03-03 DIAGNOSIS — I10 ESSENTIAL HYPERTENSION: ICD-10-CM

## 2022-03-03 DIAGNOSIS — E03.9 HYPOTHYROIDISM, UNSPECIFIED TYPE: ICD-10-CM

## 2022-03-03 DIAGNOSIS — I51.7 LVH (LEFT VENTRICULAR HYPERTROPHY): ICD-10-CM

## 2022-03-03 RX ORDER — LEVOTHYROXINE SODIUM 88 UG/1
88 TABLET ORAL DAILY
Qty: 90 TABLET | Refills: 3 | Status: SHIPPED | OUTPATIENT
Start: 2022-03-03 | End: 2023-02-28

## 2022-03-03 RX ORDER — LOSARTAN POTASSIUM 100 MG/1
100 TABLET ORAL DAILY
Qty: 90 TABLET | Refills: 3 | Status: SHIPPED | OUTPATIENT
Start: 2022-03-03 | End: 2023-04-01

## 2022-03-03 NOTE — TELEPHONE ENCOUNTER
No new care gaps identified.  Powered by [employer]. Reference number: [id number].   3/03/2022 2:11:17 PM CST

## 2022-03-03 NOTE — TELEPHONE ENCOUNTER
----- Message from Jennifer Harper sent at 3/3/2022  2:07 PM CST -----  Contact: [phone]  Who Called: PT  Regarding: transfer all rx to [facility] in [facility]    Would the patient rather a call back or a response via [facility]? Call back  Best Call Back Number: [phone]  Additional Information:

## 2022-03-04 DIAGNOSIS — I10 ESSENTIAL HYPERTENSION: ICD-10-CM

## 2022-03-04 RX ORDER — HYDROCHLOROTHIAZIDE 12.5 MG/1
TABLET ORAL
Qty: 90 TABLET | Refills: 1 | Status: SHIPPED | OUTPATIENT
Start: 2022-03-04 | End: 2022-09-06

## 2022-03-18 ENCOUNTER — PATIENT MESSAGE (OUTPATIENT)
Dept: ADMINISTRATIVE | Facility: HOSPITAL | Age: 52
End: 2022-03-18
Payer: COMMERCIAL

## 2022-04-06 ENCOUNTER — PATIENT OUTREACH (OUTPATIENT)
Dept: ADMINISTRATIVE | Facility: OTHER | Age: 52
End: 2022-04-06
Payer: COMMERCIAL

## 2022-04-06 NOTE — PROGRESS NOTES
Health Maintenance Due   Topic Date Due    Colorectal Cancer Screening  Never done    Influenza Vaccine (1) 09/01/2021    Mammogram  03/01/2022     Updates were requested from care everywhere.  Chart was reviewed for overdue [facility]active [facility] Encounters (VEL) topics (CRS, Breast Cancer Screening, Eye exam)  Health Maintenance has been updated.  LINKS immunization registry triggered.  Immunizations were reconciled.

## 2022-04-07 ENCOUNTER — OFFICE VISIT (OUTPATIENT)
Dept: PAIN MEDICINE | Facility: CLINIC | Age: 52
End: 2022-04-07
Payer: COMMERCIAL

## 2022-04-07 ENCOUNTER — HOSPITAL ENCOUNTER (OUTPATIENT)
Dept: RADIOLOGY | Facility: HOSPITAL | Age: 52
Discharge: HOME | End: 2022-04-07
Attending: NURSE PRACTITIONER
Payer: COMMERCIAL

## 2022-04-07 VITALS — WEIGHT: 182.31 LBS | BODY MASS INDEX: 29.43 KG/M2

## 2022-04-07 DIAGNOSIS — M54.16 LUMBAR RADICULOPATHY: ICD-10-CM

## 2022-04-07 DIAGNOSIS — M54.16 LUMBAR RADICULOPATHY, CHRONIC: Primary | ICD-10-CM

## 2022-04-07 DIAGNOSIS — M51.36 DDD (DEGENERATIVE DISC DISEASE), LUMBAR: ICD-10-CM

## 2022-04-07 DIAGNOSIS — G89.29 CHRONIC RIGHT-SIDED LOW BACK PAIN WITH RIGHT-SIDED SCIATICA: ICD-10-CM

## 2022-04-07 DIAGNOSIS — Z12.31 SCREENING MAMMOGRAM FOR HIGH-RISK PATIENT: ICD-10-CM

## 2022-04-07 DIAGNOSIS — M54.41 CHRONIC RIGHT-SIDED LOW BACK PAIN WITH RIGHT-SIDED SCIATICA: ICD-10-CM

## 2022-04-07 DIAGNOSIS — M53.3 SACROILIAC JOINT DYSFUNCTION: ICD-10-CM

## 2022-04-07 PROCEDURE — 1160F PR REVIEW ALL MEDS BY PRESCRIBER/CLIN PHARMACIST DOCUMENTED: ICD-10-PCS | Mod: CPTII,S$GLB,, | Performed by: NURSE PRACTITIONER

## 2022-04-07 PROCEDURE — 3008F BODY MASS INDEX DOCD: CPT | Mod: CPTII,S$GLB,, | Performed by: NURSE PRACTITIONER

## 2022-04-07 PROCEDURE — 77063 BREAST TOMOSYNTHESIS BI: CPT | Mod: TC,PO

## 2022-04-07 PROCEDURE — 1159F PR MEDICATION LIST DOCUMENTED IN MEDICAL RECORD: ICD-10-PCS | Mod: CPTII,S$GLB,, | Performed by: NURSE PRACTITIONER

## 2022-04-07 PROCEDURE — 99999 PR PBB SHADOW E&M-EST. PATIENT-LVL III: ICD-10-PCS | Mod: PBBFAC,,, | Performed by: NURSE PRACTITIONER

## 2022-04-07 PROCEDURE — 1159F MED LIST DOCD IN RCRD: CPT | Mod: CPTII,S$GLB,, | Performed by: NURSE PRACTITIONER

## 2022-04-07 PROCEDURE — 4010F PR ACE/ARB THEARPY RXD/TAKEN: ICD-10-PCS | Mod: CPTII,S$GLB,, | Performed by: NURSE PRACTITIONER

## 2022-04-07 PROCEDURE — 99214 OFFICE O/P EST MOD 30 MIN: CPT | Mod: S$GLB,,, | Performed by: NURSE PRACTITIONER

## 2022-04-07 PROCEDURE — 99214 PR OFFICE/OUTPT VISIT, EST, LEVL IV, 30-39 MIN: ICD-10-PCS | Mod: S$GLB,,, | Performed by: NURSE PRACTITIONER

## 2022-04-07 PROCEDURE — 3008F PR BODY MASS INDEX (BMI) DOCUMENTED: ICD-10-PCS | Mod: CPTII,S$GLB,, | Performed by: NURSE PRACTITIONER

## 2022-04-07 PROCEDURE — 1160F RVW MEDS BY RX/DR IN RCRD: CPT | Mod: CPTII,S$GLB,, | Performed by: NURSE PRACTITIONER

## 2022-04-07 PROCEDURE — 4010F ACE/ARB THERAPY RXD/TAKEN: CPT | Mod: CPTII,S$GLB,, | Performed by: NURSE PRACTITIONER

## 2022-04-07 PROCEDURE — 99999 PR PBB SHADOW E&M-EST. PATIENT-LVL III: CPT | Mod: PBBFAC,,, | Performed by: NURSE PRACTITIONER

## 2022-04-07 NOTE — H&P (VIEW-ONLY)
[facility]  Established Clinic Visit     Chief Complaint:   Chief Complaint   Patient presents with    Low-back Pain    Hip Pain     Bilateral          History of Present Illness: Anna Oakley is a 51 y.o. female here for low back pain and right leg pain. She presents with a history of low back pain for about 10 years without history of trauma or inciting event. She pain is located in lower lumbar region and raditating pain to right thigh/leg; anterolateral thigh and back of right calf. Her pain has been slowly progressive and has worsened in the past 2 years. She states her pain is most exacerbated when extending spine from a flexed position.     Onset: years  Location: low back  Radiation: right leg  Timing: intermittent  Quality: Aching, Burning, Tingling, Numb, Sharp and Shooting  Exacerbating Factors: sitting  Alleviating Factors: medications  Associated Symptoms: denies urinary incontinence, bowel incontinence, significant weight loss, significant motor weakness and loss of sensations    Severity: Currently: 8/10   Typical Range: 4-10/10     Exacerbation: 10/10       Interval Updates:   4/7/2022 - Ms. Oakley is following up for low back, bilateral hip and right leg pain.  Pain is currently rate 7/10 with a weekly range 7-8/10. She reports PT and naproxen has not helped. Her primary symptoms today appear to be her right leg pain, with  paraesthesia like symptoms.     Previous Interventions:  - None    Previous Therapies:  PT/OT: no   Relevant Surgery: no   Previous Medications:   - NSAIDS:   - Muscle Relaxants: Flexeril  - TCAs:  - SNRIs:   - Topicals:   - Anticonvulsants:  - Opioids: Hydrocodone    Current Pain Medications:  1. Naproxen     Blood Thinners: None    Full Medication List:    Current Outpatient Medications:     cyanocobalamin 500 MCG tablet, Take 500 mcg by mouth once daily., Disp: , Rfl:     levothyroxine (SYNTHROID) 88 MCG tablet, Take 1 tablet (88 mcg total) by mouth  once daily., Disp: 90 tablet, Rfl: 3    losartan (COZAAR) 100 MG tablet, Take 1 tablet (100 mg total) by mouth once daily., Disp: 90 tablet, Rfl: 3    medroxyPROGESTERone (DEPO-PROVERA) 150 mg/mL Syrg, INJECT 1ML INTRAMUSCULARLY EVERY 90 DAYS., Disp: , Rfl:     multivitamin with minerals tablet, Take 1 tablet by mouth once daily., Disp: , Rfl:     naproxen (NAPROSYN) 500 MG tablet, Take 1 tablet (500 mg total) by mouth 2 (two) times daily., Disp: 60 tablet, Rfl: 1     Review of Systems:  Review of Systems   Constitutional: Negative for chills, fever, malaise/fatigue and weight loss.   HENT: Negative for ear pain, nosebleeds and sore throat.    Eyes: Negative for blurred vision, pain and redness.   Respiratory: Negative for cough, shortness of breath and wheezing.    Cardiovascular: Negative for chest pain, palpitations and leg swelling.   Gastrointestinal: Negative for abdominal pain, constipation, diarrhea, heartburn, nausea and vomiting.   Genitourinary: Negative for dysuria, flank pain, frequency and urgency.   Musculoskeletal: Positive for back pain. Negative for falls, joint pain, myalgias and neck pain.   Skin: Negative for itching and rash.   Neurological: Positive for tingling. Negative for dizziness, focal weakness, seizures, weakness and headaches.   Endo/Heme/Allergies: Does not bruise/bleed easily.   Psychiatric/Behavioral: Negative for depression and memory loss. The patient is not nervous/anxious and does not have insomnia.        Allergies:  Patient has no known allergies.     Medical History:   has a past medical history of Hypertension.    Surgical History:   has a past surgical history that includes Tubal ligation and Ablation.  2010 fusion of cervical spine    Family History:  family history includes Breast cancer in her mother. Mother had a cervical fusion in 1995    Social History:   reports that she has never smoked. She does not have any smokeless tobacco history on file. She reports  previous alcohol use. She reports that she does not use drugs.    Physical Exam:  Wt 82.7 kg (182 lb 5.1 oz)   BMI 29.43 kg/m²   GEN: No acute distress. Calm, comfortable  HENT: Normocephalic, atraumatic, moist mucous membranes  EYE: Anicteric sclera, non-injected.   CV: Non-diaphoretic.   RESP: Breathing comfortably. Chest expansion symmetric.  EXT: No clubbing, cyanosis.   SKIN: Warm, & dry to palpation. No visible rashes or lesions of exposed skin.   PSYCH: Pleasant mood and appropriate affect. Recent and remote memory intact.   GAIT: Independent ambulation  Lumbar Spine Exam:       Inspection: No erythema, bruising. No surgical incisions      Palpation: (+) TTP of sacroiliac joint on right.       ROM: Not Limited in flexion, extension, lateral bending      (+) Facet loading bilaterally      (+) Straight Leg Raise bilaterally on right      (+) Lasegue sign bilaterally on right      (-) OH bilaterally      (+) SIJ Compression Test on right  Hip Exam:      Inspection: No gross deformity or apparent leg length discrepancy      Palpation: +TTP of right gtb.       ROM:  full internal rotation, external rotation      (-) FADIR bilaterally  Neurologic Exam:     Alert. Speech is fluent and appropriate.     Strength: 5/5 throughout bilateral lower extremities     Sensation: Grossly intact to light touch in bilateral lower extremities     Reflexes: 2+ in b/l patella, achilles     Tone: No abnormality appreciated in bilateral lower extremities     No Clonus     Downgoing toes on plantar stimulation     (+) Hogue bilaterally                Imaging:  - X-ray lumbar spine 10/14/21:  The alignment of the lumbar spine is normal.  The vertebral body heights are well maintained.  Disc space narrowing L5-S1.  No fracture, no osseous lesions.  The sacroiliac joints appear symmetrical on the AP view.  The soft tissues appear normal.    Labs:  BMP  Lab Results   Component Value Date     11/22/2021    K 4.8 11/22/2021    CL  108 11/22/2021    CO2 23 11/22/2021    BUN 13 11/22/2021    CREATININE 0.74 11/22/2021    CALCIUM 9.4 11/22/2021    ANIONGAP 11 11/22/2021    ESTGFRAFRICA >60.0 11/22/2021    EGFRNONAA >60.0 11/22/2021     Lab Results   Component Value Date    ALT 20 11/22/2021    AST 26 11/22/2021    ALKPHOS 65 11/22/2021    BILITOT 0.7 11/22/2021     Lab Results   Component Value Date     11/22/2021       Assessment:  Anna Oakley is a 51 y.o. female with the following diagnoses based on history, exam, and imaging:    Problem List Items Addressed This Visit        Neuro    Lumbar radiculopathy    Sacroiliac joint dysfunction       Orthopedic    Chronic right-sided low back pain with right-sided sciatica      Other Visit Diagnoses     Lumbar radiculopathy, chronic    -  Primary    Relevant Orders    MRI Lumbar Spine Without Contrast    DDD (degenerative disc disease), lumbar        Relevant Orders    MRI Lumbar Spine Without Contrast          This is a pleasant 51 y.o. lady presenting with:     - Chronic low back pain with radiating symptoms into RLE. No weakness on exam. History and exam support her primary pain generator to be her right SI joint. She also has component of right GTB and possible lumbar radiculopathy.  - Comorbidities: HypoTH.     4/7/2022- 50 y/o with Hx of chronic low back, bilateral hip with radiating symptoms in her right leg. Upon Hx  And exam today I do believe she may have some right SI joint involvement. He primary issue could be NF or CS. I will order an Lumbar MRI today to rule out pathology. No weakness upon exam, + Patricks more on the right.     Treatment Plan:   - PT/OT/HEP: Continue PT.  Discussed benefits of exercise for pain.   - Procedures:  Consider Lumbar COLBY pending Lumbar MRI                            Consider Right SI joint injection if suboptimal relief from PT and PRN Naproxen  - Medications:  Continue Naproxen 500mg BID PRN if helping if not discontinue. Educated patient  about potential GI, cardiovascular, and renal risks and stressed importance of PRN usage.  - Imaging: Ordered MRI Lumbar today   - Labs: Reviewed.  Medications are appropriately dosed for current hepatorenal function.    Follow Up: Will call with Lumbar  MRI results and discuss  Plan of care.     Norman Brown NP-C  Interventional Pain Management      Disclaimer: This note was partly generated using dictation software which may occasionally result in transcription errors.

## 2022-04-07 NOTE — PROGRESS NOTES
[facility]  Established Clinic Visit     Chief Complaint:   Chief Complaint   Patient presents with    Low-back Pain    Hip Pain     Bilateral          History of Present Illness: Anna Oakley is a 51 y.o. female here for low back pain and right leg pain. She presents with a history of low back pain for about 10 years without history of trauma or inciting event. She pain is located in lower lumbar region and raditating pain to right thigh/leg; anterolateral thigh and back of right calf. Her pain has been slowly progressive and has worsened in the past 2 years. She states her pain is most exacerbated when extending spine from a flexed position.     Onset: years  Location: low back  Radiation: right leg  Timing: intermittent  Quality: Aching, Burning, Tingling, Numb, Sharp and Shooting  Exacerbating Factors: sitting  Alleviating Factors: medications  Associated Symptoms: denies urinary incontinence, bowel incontinence, significant weight loss, significant motor weakness and loss of sensations    Severity: Currently: 8/10   Typical Range: 4-10/10     Exacerbation: 10/10       Interval Updates:   4/7/2022 - Ms. Oakley is following up for low back, bilateral hip and right leg pain.  Pain is currently rate 7/10 with a weekly range 7-8/10. She reports PT and naproxen has not helped. Her primary symptoms today appear to be her right leg pain, with  paraesthesia like symptoms.     Previous Interventions:  - None    Previous Therapies:  PT/OT: no   Relevant Surgery: no   Previous Medications:   - NSAIDS:   - Muscle Relaxants: Flexeril  - TCAs:  - SNRIs:   - Topicals:   - Anticonvulsants:  - Opioids: Hydrocodone    Current Pain Medications:  1. Naproxen     Blood Thinners: None    Full Medication List:    Current Outpatient Medications:     cyanocobalamin 500 MCG tablet, Take 500 mcg by mouth once daily., Disp: , Rfl:     levothyroxine (SYNTHROID) 88 MCG tablet, Take 1 tablet (88 mcg total) by mouth  once daily., Disp: 90 tablet, Rfl: 3    losartan (COZAAR) 100 MG tablet, Take 1 tablet (100 mg total) by mouth once daily., Disp: 90 tablet, Rfl: 3    medroxyPROGESTERone (DEPO-PROVERA) 150 mg/mL Syrg, INJECT 1ML INTRAMUSCULARLY EVERY 90 DAYS., Disp: , Rfl:     multivitamin with minerals tablet, Take 1 tablet by mouth once daily., Disp: , Rfl:     naproxen (NAPROSYN) 500 MG tablet, Take 1 tablet (500 mg total) by mouth 2 (two) times daily., Disp: 60 tablet, Rfl: 1     Review of Systems:  Review of Systems   Constitutional: Negative for chills, fever, malaise/fatigue and weight loss.   HENT: Negative for ear pain, nosebleeds and sore throat.    Eyes: Negative for blurred vision, pain and redness.   Respiratory: Negative for cough, shortness of breath and wheezing.    Cardiovascular: Negative for chest pain, palpitations and leg swelling.   Gastrointestinal: Negative for abdominal pain, constipation, diarrhea, heartburn, nausea and vomiting.   Genitourinary: Negative for dysuria, flank pain, frequency and urgency.   Musculoskeletal: Positive for back pain. Negative for falls, joint pain, myalgias and neck pain.   Skin: Negative for itching and rash.   Neurological: Positive for tingling. Negative for dizziness, focal weakness, seizures, weakness and headaches.   Endo/Heme/Allergies: Does not bruise/bleed easily.   Psychiatric/Behavioral: Negative for depression and memory loss. The patient is not nervous/anxious and does not have insomnia.        Allergies:  Patient has no known allergies.     Medical History:   has a past medical history of Hypertension.    Surgical History:   has a past surgical history that includes Tubal ligation and Ablation.  2010 fusion of cervical spine    Family History:  family history includes Breast cancer in her mother. Mother had a cervical fusion in 1995    Social History:   reports that she has never smoked. She does not have any smokeless tobacco history on file. She reports  previous alcohol use. She reports that she does not use drugs.    Physical Exam:  Wt 82.7 kg (182 lb 5.1 oz)   BMI 29.43 kg/m²   GEN: No acute distress. Calm, comfortable  HENT: Normocephalic, atraumatic, moist mucous membranes  EYE: Anicteric sclera, non-injected.   CV: Non-diaphoretic.   RESP: Breathing comfortably. Chest expansion symmetric.  EXT: No clubbing, cyanosis.   SKIN: Warm, & dry to palpation. No visible rashes or lesions of exposed skin.   PSYCH: Pleasant mood and appropriate affect. Recent and remote memory intact.   GAIT: Independent ambulation  Lumbar Spine Exam:       Inspection: No erythema, bruising. No surgical incisions      Palpation: (+) TTP of sacroiliac joint on right.       ROM: Not Limited in flexion, extension, lateral bending      (+) Facet loading bilaterally      (+) Straight Leg Raise bilaterally on right      (+) Lasegue sign bilaterally on right      (-) OH bilaterally      (+) SIJ Compression Test on right  Hip Exam:      Inspection: No gross deformity or apparent leg length discrepancy      Palpation: +TTP of right gtb.       ROM:  full internal rotation, external rotation      (-) FADIR bilaterally  Neurologic Exam:     Alert. Speech is fluent and appropriate.     Strength: 5/5 throughout bilateral lower extremities     Sensation: Grossly intact to light touch in bilateral lower extremities     Reflexes: 2+ in b/l patella, achilles     Tone: No abnormality appreciated in bilateral lower extremities     No Clonus     Downgoing toes on plantar stimulation     (+) Hogue bilaterally                Imaging:  - X-ray lumbar spine 10/14/21:  The alignment of the lumbar spine is normal.  The vertebral body heights are well maintained.  Disc space narrowing L5-S1.  No fracture, no osseous lesions.  The sacroiliac joints appear symmetrical on the AP view.  The soft tissues appear normal.    Labs:  BMP  Lab Results   Component Value Date     11/22/2021    K 4.8 11/22/2021    CL  108 11/22/2021    CO2 23 11/22/2021    BUN 13 11/22/2021    CREATININE 0.74 11/22/2021    CALCIUM 9.4 11/22/2021    ANIONGAP 11 11/22/2021    ESTGFRAFRICA >60.0 11/22/2021    EGFRNONAA >60.0 11/22/2021     Lab Results   Component Value Date    ALT 20 11/22/2021    AST 26 11/22/2021    ALKPHOS 65 11/22/2021    BILITOT 0.7 11/22/2021     Lab Results   Component Value Date     11/22/2021       Assessment:  Anna Oakley is a 51 y.o. female with the following diagnoses based on history, exam, and imaging:    Problem List Items Addressed This Visit        Neuro    Lumbar radiculopathy    Sacroiliac joint dysfunction       Orthopedic    Chronic right-sided low back pain with right-sided sciatica      Other Visit Diagnoses     Lumbar radiculopathy, chronic    -  Primary    Relevant Orders    MRI Lumbar Spine Without Contrast    DDD (degenerative disc disease), lumbar        Relevant Orders    MRI Lumbar Spine Without Contrast          This is a pleasant 51 y.o. lady presenting with:     - Chronic low back pain with radiating symptoms into RLE. No weakness on exam. History and exam support her primary pain generator to be her right SI joint. She also has component of right GTB and possible lumbar radiculopathy.  - Comorbidities: HypoTH.     4/7/2022- 52 y/o with Hx of chronic low back, bilateral hip with radiating symptoms in her right leg. Upon Hx  And exam today I do believe she may have some right SI joint involvement. He primary issue could be NF or CS. I will order an Lumbar MRI today to rule out pathology. No weakness upon exam, + Patricks more on the right.     Treatment Plan:   - PT/OT/HEP: Continue PT.  Discussed benefits of exercise for pain.   - Procedures:  Consider Lumbar COLBY pending Lumbar MRI                            Consider Right SI joint injection if suboptimal relief from PT and PRN Naproxen  - Medications:  Continue Naproxen 500mg BID PRN if helping if not discontinue. Educated patient  about potential GI, cardiovascular, and renal risks and stressed importance of PRN usage.  - Imaging: Ordered MRI Lumbar today   - Labs: Reviewed.  Medications are appropriately dosed for current hepatorenal function.    Follow Up: Will call with Lumbar  MRI results and discuss  Plan of care.     Norman Brown NP-C  Interventional Pain Management      Disclaimer: This note was partly generated using dictation software which may occasionally result in transcription errors.

## 2022-04-11 ENCOUNTER — HOSPITAL ENCOUNTER (OUTPATIENT)
Dept: RADIOLOGY | Facility: HOSPITAL | Age: 52
Discharge: HOME | End: 2022-04-11
Attending: NURSE PRACTITIONER
Payer: COMMERCIAL

## 2022-04-11 ENCOUNTER — TELEPHONE (OUTPATIENT)
Dept: PAIN MEDICINE | Facility: CLINIC | Age: 52
End: 2022-04-11
Payer: COMMERCIAL

## 2022-04-11 DIAGNOSIS — M54.16 LUMBAR RADICULOPATHY, CHRONIC: ICD-10-CM

## 2022-04-11 DIAGNOSIS — M51.36 DDD (DEGENERATIVE DISC DISEASE), LUMBAR: ICD-10-CM

## 2022-04-11 PROCEDURE — 72148 MRI LUMBAR SPINE W/O DYE: CPT | Mod: TC,PO

## 2022-04-11 NOTE — TELEPHONE ENCOUNTER
Call patient to discuss recent lumbar MRI, explained results in layman's terms recommended scheduling patient for lumbar COLBY targeting L5-S1 to help with low back and right leg pain, patient was amenable to this plan.      Norman Brown, NP-C  Interventional Pain Management

## 2022-04-12 ENCOUNTER — TELEPHONE (OUTPATIENT)
Dept: PAIN MEDICINE | Facility: CLINIC | Age: 52
End: 2022-04-12
Payer: COMMERCIAL

## 2022-04-12 NOTE — TELEPHONE ENCOUNTER
Patient has been scheduled for procedure on 4/13/22. Time of arrival 1pm.  Denies/Confirms blood thinners  Denies/Confirms pacemaker/ICD      · Check in at the registration desk on the first floor of the hospital. [address] [place] [id number]  · Please DO NOT eat or drink 8 hours prior to your arrival time for the procedure, if diabetic 6 hours prior. If you are taking medications for blood pressure, heart medications, thyroid, cholesterol; you can take them with a small sip of water.  · Refrain from drinking alcohol within 24 hours prior to your procedure  · You will need someone to drive you home after procedure. You cannot take taxi, Uber, or Lyft.  · If you start feeling sick (fever, chills, or coughing) or start on any antibiotics please contact us at [phone] to reschedule.     Pt voiced understanding and confirmed procedure date and time.

## 2022-04-12 NOTE — TELEPHONE ENCOUNTER
----- Message from Samaria Ledesma Patient Care Assistant sent at 4/12/2022  3:41 PM CDT -----  Type:  Patient Returning Call    Who Called:pt  Who Left Message for Patient: office  Does the patient know what this is regarding?:   Would the patient rather a call back or a response via [facility]?  Please call  Best Call Back Number: [phone]   Additional Information:

## 2022-04-12 NOTE — TELEPHONE ENCOUNTER
----- Message from CLARE Torres sent at 4/12/2022  8:30 AM CDT -----  Regarding: change injection  Please change her injection to a right TFESI L5-S1    Thanks

## 2022-04-13 ENCOUNTER — TELEPHONE (OUTPATIENT)
Dept: PAIN MEDICINE | Facility: CLINIC | Age: 52
End: 2022-04-13
Payer: COMMERCIAL

## 2022-04-13 ENCOUNTER — PATIENT MESSAGE (OUTPATIENT)
Dept: ADMINISTRATIVE | Facility: HOSPITAL | Age: 52
End: 2022-04-13
Payer: COMMERCIAL

## 2022-04-13 ENCOUNTER — PATIENT MESSAGE (OUTPATIENT)
Dept: PAIN MEDICINE | Facility: CLINIC | Age: 52
End: 2022-04-13
Payer: COMMERCIAL

## 2022-04-13 DIAGNOSIS — Z12.11 SCREENING FOR COLON CANCER: ICD-10-CM

## 2022-04-13 DIAGNOSIS — M54.16 LUMBAR RADICULOPATHY: Primary | ICD-10-CM

## 2022-04-19 ENCOUNTER — TELEPHONE (OUTPATIENT)
Dept: PAIN MEDICINE | Facility: CLINIC | Age: 52
End: 2022-04-19
Payer: COMMERCIAL

## 2022-04-19 NOTE — DISCHARGE INSTRUCTIONS
Home Care Instructions Pain Management:    1.  DIET:    You may resume your normal diet today.    2.  BATHING:    You may shower with luke warm water.    3.  DRESSING:    You may remove your bandage today.    4.  ACTIVITY LEVEL:      You may resume your normal activities 24 hours after your procedure.    5.  MEDICATIONS:    You may resume your normal medications today.    6.  SPECIAL INSTRUCTIONS:    No heat to the injection site for 24 hours including bath or shower, heating pad, moist heat or hot tubs.    Use an ice pack to the injection site for any pain or discomfort.  Apply ice packs for 20 minute intervals as needed.    If you have received any sedatives by mouth today, you can not drive for 12 hours.    If you have received sedation through an IV, you can not drive for 24 hours.    PLEASE CALL YOUR DOCTOR FOR THE FOLLOWIN.  Redness or swelling around the injection site.  2.  Fever of 101 degrees.  3.  Drainage (pus) from the injection site.  4.  For any continuous bleeding (some dried blood over the incision is normal.)    FOR EMERGENCIES:    If any unusual problems or difficulties occur during clinic hours, call [phone] or dial [phone].    Follow up with with your physician in 2-3 weeks.

## 2022-04-20 ENCOUNTER — HOSPITAL ENCOUNTER (OUTPATIENT)
Facility: HOSPITAL | Age: 52
Discharge: HOME | End: 2022-04-20
Attending: PHYSICAL MEDICINE & REHABILITATION | Admitting: PHYSICAL MEDICINE & REHABILITATION
Payer: COMMERCIAL

## 2022-04-20 VITALS
OXYGEN SATURATION: 100 % | HEIGHT: 66 IN | SYSTOLIC BLOOD PRESSURE: 166 MMHG | DIASTOLIC BLOOD PRESSURE: 77 MMHG | WEIGHT: 180 LBS | HEART RATE: 70 BPM | BODY MASS INDEX: 28.93 KG/M2 | RESPIRATION RATE: 16 BRPM | TEMPERATURE: 97 F

## 2022-04-20 DIAGNOSIS — M54.16 LUMBAR RADICULOPATHY: Primary | ICD-10-CM

## 2022-04-20 DIAGNOSIS — R52 PAIN: ICD-10-CM

## 2022-04-20 LAB
B-HCG UR QL: NEGATIVE
CTP QC/QA: YES

## 2022-04-20 PROCEDURE — 25500020 PHARM REV CODE 255: Performed by: PHYSICAL MEDICINE & REHABILITATION

## 2022-04-20 PROCEDURE — 25000003 PHARM REV CODE 250: Performed by: PHYSICAL MEDICINE & REHABILITATION

## 2022-04-20 PROCEDURE — 81025 URINE PREGNANCY TEST: CPT | Performed by: PHYSICAL MEDICINE & REHABILITATION

## 2022-04-20 PROCEDURE — 64483 PR EPIDURAL INJ, ANES/STEROID, TRANSFORAMINAL, LUMB/SACR, SNGL LEVL: ICD-10-PCS | Mod: 50,,, | Performed by: PHYSICAL MEDICINE & REHABILITATION

## 2022-04-20 PROCEDURE — 63600175 PHARM REV CODE 636 W HCPCS: Performed by: PHYSICAL MEDICINE & REHABILITATION

## 2022-04-20 PROCEDURE — 64483 NJX AA&/STRD TFRM EPI L/S 1: CPT | Performed by: PHYSICAL MEDICINE & REHABILITATION

## 2022-04-20 PROCEDURE — 64483 NJX AA&/STRD TFRM EPI L/S 1: CPT | Mod: 50,,, | Performed by: PHYSICAL MEDICINE & REHABILITATION

## 2022-04-20 PROCEDURE — 64484 NJX AA&/STRD TFRM EPI L/S EA: CPT | Performed by: PHYSICAL MEDICINE & REHABILITATION

## 2022-04-20 RX ORDER — LIDOCAINE HYDROCHLORIDE 10 MG/ML
INJECTION, SOLUTION EPIDURAL; INFILTRATION; INTRACAUDAL; PERINEURAL
Status: DISCONTINUED | OUTPATIENT
Start: 2022-04-20 | End: 2022-04-20 | Stop reason: HOSPADM

## 2022-04-20 RX ORDER — INDOMETHACIN 25 MG/1
CAPSULE ORAL
Status: DISCONTINUED | OUTPATIENT
Start: 2022-04-20 | End: 2022-04-20 | Stop reason: HOSPADM

## 2022-04-20 RX ORDER — DEXAMETHASONE SODIUM PHOSPHATE 4 MG/ML
INJECTION, SOLUTION INTRA-ARTICULAR; INTRALESIONAL; INTRAMUSCULAR; INTRAVENOUS; SOFT TISSUE
Status: DISCONTINUED | OUTPATIENT
Start: 2022-04-20 | End: 2022-04-20 | Stop reason: HOSPADM

## 2022-04-20 RX ORDER — LIDOCAINE HYDROCHLORIDE 10 MG/ML
INJECTION INFILTRATION; PERINEURAL
Status: DISCONTINUED | OUTPATIENT
Start: 2022-04-20 | End: 2022-04-20 | Stop reason: HOSPADM

## 2022-04-20 NOTE — DISCHARGE SUMMARY
[facility]  Discharge Note  Short Stay     Admit Date: 4/20/2022    Discharge Date: 4/20/2022     Attending Physician: Janee Johnson M.D.    Diagnoses:  Active Hospital Problems    Diagnosis  POA    *Lumbar radiculopathy [M54.16]  Yes      Resolved Hospital Problems   No resolved problems to display.     Discharged Condition: Good     Hospital Course: Patient was admitted for an outpatient interventional pain management procedure and tolerated the procedure well with no complications.     Final Diagnoses: Same as principal problem.     Disposition: Home or Self Care     Follow up/Patient Instructions:   Follow-up in 1-2 weeks unless otherwise instructed. May return sooner as needed.       Reconciled Medications:     Medication List      CONTINUE taking these medications    cyanocobalamin 500 MCG tablet  Take 500 mcg by mouth once daily.     levothyroxine 88 MCG tablet  Commonly known as: SYNTHROID  Take 1 tablet (88 mcg total) by mouth once daily.     losartan 100 MG tablet  Commonly known as: COZAAR  Take 1 tablet (100 mg total) by mouth once daily.     medroxyPROGESTERone 150 mg/mL Syrg  Commonly known as: DEPO-PROVERA  INJECT 1ML INTRAMUSCULARLY EVERY 90 DAYS.     multivitamin with minerals tablet  Take 1 tablet by mouth once daily.     naproxen 500 MG tablet  Commonly known as: NAPROSYN  Take 1 tablet (500 mg total) by mouth 2 (two) times daily.           Discharge Procedure Orders (must include Diet, Follow-up, Activity)   Ice to affected area   Order Comments: 20 minutes of ice or until area numb to the touch if area is sore 2-3 times per day as needed     No driving until:   Order Comments: Until following day     No dressing needed     Notify your health care provider if you experience any of the following:  temperature >100.4     Notify your health care provider if you experience any of the following:  persistent nausea and vomiting or diarrhea     Notify your health care provider if you  experience any of the following:  severe uncontrolled pain     Notify your health care provider if you experience any of the following:  redness, tenderness, or signs of infection (pain, swelling, redness, odor or green/yellow discharge around incision site)     Notify your health care provider if you experience any of the following:  difficulty breathing or increased cough     Notify your health care provider if you experience any of the following:  severe persistent headache     Notify your health care provider if you experience any of the following:  worsening rash     Notify your health care provider if you experience any of the following:  persistent dizziness, light-headedness, or visual disturbances     Notify your health care provider if you experience any of the following:  increased confusion or weakness     Shower on day dressing removed (No bath)       Janee Johnson M.D.  Interventional Pain Medicine / Physical Medicine & Rehabilitation

## 2022-04-20 NOTE — OP NOTE
Lumbar Transforaminal Epidural Steroid Injection under fluoroscopic guidance  I have reviewed the patient's medications, allergies and relevant histories prior to the procedure and no contraindications have been identified. The risks, benefits and alternatives to the procedure were discussed with the patient, and all questions regarding the procedure were answered to the patient's satisfaction. I personally obtained Anna's consent prior to the start of the procedure and the signed consent can be found in the patient's chart.                                                         Time-out was taken to identify patient, procedure, laterality, and allergies prior to starting the procedure.       Date of Service: 04/20/2022  Procedure: Bilateral L5 transforaminal epidural steroid injection under fluoroscopy  Pre-Operative Diagnosis: Lumbar Radiculopathy  Post-Operative Diagnosis: Lumbar Radiculopathy    Physician: Janee Johnson M.D.  Assistants: Gagan Sheikh M.D.      Medications Injected:  Preservative-free dexamethasone 10 mg/mL & 5 mL of Xylocaine 1% MPF (3 mL injected per site).   Local Anesthetic: Xylocaine 1% 10 mL with Sodium Bicarbonate 1ml.   Sedation Medications: None.    Procedural Technique:   Laying in a prone position, the patient was prepped and draped in the usual sterile fashion using ChloraPrep and fenestrated drape.  The vertebral foramen of interest was determined under fluoroscopic guidance.  Local anesthetic was given by raising a wheel and going down to the hub of a 25-gauge 1.5 inch needle.  The 5 inch 22-gauge spinal needle was introduced towards the inferior-medial aspect transverse process of each above named nerve root levels.  The needle was walked medially then hinged into the neural foramen of the levels stated above.  After negative aspiration, 2-3 mL of Omnipaque was injected to confirm appropriate placement and that there was no vascular runoff.  The medication was then injected  slowly. Needles were removed and bandages were applied to the areas. The patient tolerated the procedure well. The procedure was repeated on the contralateral side in a similar manner.     Estimated Blood Loss:  None.  Complications:  None.     Disposition: Vital signs remained stable throughout the procedure. The patient was taken to the recovery area where written discharge instructions for the procedure were given.     Follow-Up: We will see the patient back in two weeks or the patient may call to inform of status. The patient was discharged in a stable condition.

## 2022-04-20 NOTE — INTERVAL H&P NOTE
"HPI  Patient presenting for Procedure(s) (LRB):  Injection,steroid,epidural,transforaminal bilateral L5     Patient on Anti-coagulation No     Since previous clinic visit, the pain has started to also radiate down the left leg as well as the right leg into the tops of both feet. Previously, a right L5 and S1 TF COLBY was planned. However, today we will proceed with bilateral L5 TF COLBY.    No health changes since previous encounter. Denies any fevers or infections.     No current facility-administered medications on file prior to encounter.     Current Outpatient Medications on File Prior to Encounter   Medication Sig Dispense Refill    levothyroxine (SYNTHROID) 88 MCG tablet Take 1 tablet (88 mcg total) by mouth once daily. 90 tablet 3    losartan (COZAAR) 100 MG tablet Take 1 tablet (100 mg total) by mouth once daily. 90 tablet 3    cyanocobalamin 500 MCG tablet Take 500 mcg by mouth once daily.      medroxyPROGESTERone (DEPO-PROVERA) 150 mg/mL Syrg INJECT 1ML INTRAMUSCULARLY EVERY 90 DAYS.      multivitamin with minerals tablet Take 1 tablet by mouth once daily.      naproxen (NAPROSYN) 500 MG tablet Take 1 tablet (500 mg total) by mouth 2 (two) times daily. 60 tablet 1     Past Medical History:   Diagnosis Date    Hypertension      Past Surgical History:   Procedure Laterality Date    ABLATION      TUBAL LIGATION       Review of patient's allergies indicates:  No Known Allergies   No current facility-administered medications for this encounter.       PMHx, PSHx, Allergies, Medications reviewed in epic    ROS negative except pain complaints in HPI    OBJECTIVE:    BP (!) 170/87   Pulse 72   Temp 97.9 °F (36.6 °C) (Skin)   Resp 18   Ht 5' 6" (1.676 m)   Wt 81.6 kg (180 lb)   SpO2 100%   Breastfeeding No   BMI 29.05 kg/m²     PHYSICAL EXAMINATION:    GENERAL: Well appearing, in no acute distress, alert and oriented.  PSYCH:  Mood and affect appropriate.  SKIN: Skin color, texture, turgor normal in procedure " area, no rashes or lesions which will impact the procedure.  CV: RRR with palpation of the radial artery.  PULM: No evidence of respiratory difficulty, symmetric chest rise. Clear to auscultation.  NEURO: Alert. Oriented. Speech fluent and appropriate. Moving all extremities.    Plan:    Proceed with procedure as planned Procedure(s) (LRB):  Injection,steroid,epidural,transforaminal bilateral L5     Gagan Sheikh  04/20/2022

## 2022-04-27 DIAGNOSIS — Z12.11 COLON CANCER SCREENING: ICD-10-CM

## 2022-05-10 ENCOUNTER — PATIENT OUTREACH (OUTPATIENT)
Dept: ADMINISTRATIVE | Facility: OTHER | Age: 52
End: 2022-05-10
Payer: COMMERCIAL

## 2022-05-11 ENCOUNTER — OFFICE VISIT (OUTPATIENT)
Dept: PAIN MEDICINE | Facility: CLINIC | Age: 52
End: 2022-05-11
Payer: COMMERCIAL

## 2022-05-11 ENCOUNTER — TELEPHONE (OUTPATIENT)
Dept: PAIN MEDICINE | Facility: CLINIC | Age: 52
End: 2022-05-11
Payer: COMMERCIAL

## 2022-05-11 VITALS
HEART RATE: 70 BPM | BODY MASS INDEX: 29.04 KG/M2 | WEIGHT: 179.88 LBS | SYSTOLIC BLOOD PRESSURE: 135 MMHG | DIASTOLIC BLOOD PRESSURE: 85 MMHG

## 2022-05-11 DIAGNOSIS — R52 PAIN: ICD-10-CM

## 2022-05-11 DIAGNOSIS — M51.36 DDD (DEGENERATIVE DISC DISEASE), LUMBAR: ICD-10-CM

## 2022-05-11 DIAGNOSIS — M54.16 LUMBAR RADICULOPATHY: Primary | ICD-10-CM

## 2022-05-11 DIAGNOSIS — M54.16 LUMBAR RADICULOPATHY, CHRONIC: ICD-10-CM

## 2022-05-11 PROCEDURE — 99214 OFFICE O/P EST MOD 30 MIN: CPT | Mod: S$GLB,,, | Performed by: NURSE PRACTITIONER

## 2022-05-11 PROCEDURE — 3079F PR MOST RECENT DIASTOLIC BLOOD PRESSURE 80-89 MM HG: ICD-10-PCS | Mod: CPTII,S$GLB,, | Performed by: NURSE PRACTITIONER

## 2022-05-11 PROCEDURE — 99214 PR OFFICE/OUTPT VISIT, EST, LEVL IV, 30-39 MIN: ICD-10-PCS | Mod: S$GLB,,, | Performed by: NURSE PRACTITIONER

## 2022-05-11 PROCEDURE — 1160F PR REVIEW ALL MEDS BY PRESCRIBER/CLIN PHARMACIST DOCUMENTED: ICD-10-PCS | Mod: CPTII,S$GLB,, | Performed by: NURSE PRACTITIONER

## 2022-05-11 PROCEDURE — 4010F ACE/ARB THERAPY RXD/TAKEN: CPT | Mod: CPTII,S$GLB,, | Performed by: NURSE PRACTITIONER

## 2022-05-11 PROCEDURE — 4010F PR ACE/ARB THEARPY RXD/TAKEN: ICD-10-PCS | Mod: CPTII,S$GLB,, | Performed by: NURSE PRACTITIONER

## 2022-05-11 PROCEDURE — 3008F BODY MASS INDEX DOCD: CPT | Mod: CPTII,S$GLB,, | Performed by: NURSE PRACTITIONER

## 2022-05-11 PROCEDURE — 3008F PR BODY MASS INDEX (BMI) DOCUMENTED: ICD-10-PCS | Mod: CPTII,S$GLB,, | Performed by: NURSE PRACTITIONER

## 2022-05-11 PROCEDURE — 3075F PR MOST RECENT SYSTOLIC BLOOD PRESS GE 130-139MM HG: ICD-10-PCS | Mod: CPTII,S$GLB,, | Performed by: NURSE PRACTITIONER

## 2022-05-11 PROCEDURE — 3079F DIAST BP 80-89 MM HG: CPT | Mod: CPTII,S$GLB,, | Performed by: NURSE PRACTITIONER

## 2022-05-11 PROCEDURE — 3075F SYST BP GE 130 - 139MM HG: CPT | Mod: CPTII,S$GLB,, | Performed by: NURSE PRACTITIONER

## 2022-05-11 PROCEDURE — 99999 PR PBB SHADOW E&M-EST. PATIENT-LVL III: CPT | Mod: PBBFAC,,, | Performed by: NURSE PRACTITIONER

## 2022-05-11 PROCEDURE — 1160F RVW MEDS BY RX/DR IN RCRD: CPT | Mod: CPTII,S$GLB,, | Performed by: NURSE PRACTITIONER

## 2022-05-11 PROCEDURE — 1159F PR MEDICATION LIST DOCUMENTED IN MEDICAL RECORD: ICD-10-PCS | Mod: CPTII,S$GLB,, | Performed by: NURSE PRACTITIONER

## 2022-05-11 PROCEDURE — 99999 PR PBB SHADOW E&M-EST. PATIENT-LVL III: ICD-10-PCS | Mod: PBBFAC,,, | Performed by: NURSE PRACTITIONER

## 2022-05-11 PROCEDURE — 1159F MED LIST DOCD IN RCRD: CPT | Mod: CPTII,S$GLB,, | Performed by: NURSE PRACTITIONER

## 2022-05-11 NOTE — PROGRESS NOTES
Health Maintenance Due   Topic Date Due    Colorectal Cancer Screening  Never done    COVID-19 Vaccine (3 - Booster for Devyn series) 05/02/2022     Updates were requested from care everywhere.  Chart was reviewed for overdue Proactive [facility] Encounters (VEL) topics (CRS, Breast Cancer Screening, Eye exam)  Health Maintenance has been updated.  LINKS immunization registry triggered.  Immunizations were reconciled.

## 2022-05-11 NOTE — H&P (VIEW-ONLY)
[facility]  Established Clinic Visit     Chief Complaint:   Chief Complaint   Patient presents with    Low-back Pain         History of Present Illness: Anna Oakley is a 51 y.o. female here for low back pain and right leg pain. She presents with a history of low back pain for about 10 years without history of trauma or inciting event. She pain is located in lower lumbar region and raditating pain to right thigh/leg; anterolateral thigh and back of right calf. Her pain has been slowly progressive and has worsened in the past 2 years. She states her pain is most exacerbated when extending spine from a flexed position.     Onset: years  Location: low back  Radiation: right leg  Timing: intermittent  Quality: Aching, Burning, Tingling, Numb, Sharp and Shooting  Exacerbating Factors: sitting  Alleviating Factors: medications  Associated Symptoms: denies urinary incontinence, bowel incontinence, significant weight loss, significant motor weakness and loss of sensations    Severity: Currently: 8/10   Typical Range: 4-10/10     Exacerbation: 10/10       Interval Updates: 5/11/2022 -  Adin returns to clinic s/p TFESI targeting L5-S1 on 4/20/22 with 0% relief.  She reports a pain intensity 10/10 today with a weekly range of 10-10/10.    Mr. Palma reports continued pain across her low back that radiates into her thighs in the L4/5 and L5/S1 dermatome distribution.  She is reporting paresthesia like symptoms in her anterior thighs and sometimes running down to her lower extremity.  She denies any profound weakness, denies any bowel bladder dysfunction, denies any saddle anesthesia denies any recent incident or trauma.        Previous Interventions:  - 04/20/2022-Bilateral L5  0% relief    Previous Therapies:  PT/OT: no   Relevant Surgery: no   Previous Medications:   - NSAIDS:   - Muscle Relaxants: Flexeril  - TCAs:  - SNRIs:   - Topicals:   - Anticonvulsants:  - Opioids: Hydrocodone    Current Pain  Medications:  1. Naproxen p.r.n.    Blood Thinners: None    Full Medication List:    Current Outpatient Medications:     cyanocobalamin 500 MCG tablet, Take 500 mcg by mouth once daily., Disp: , Rfl:     levothyroxine (SYNTHROID) 88 MCG tablet, Take 1 tablet (88 mcg total) by mouth once daily., Disp: 90 tablet, Rfl: 3    losartan (COZAAR) 100 MG tablet, Take 1 tablet (100 mg total) by mouth once daily., Disp: 90 tablet, Rfl: 3    medroxyPROGESTERone (DEPO-PROVERA) 150 mg/mL Syrg, INJECT 1ML INTRAMUSCULARLY EVERY 90 DAYS., Disp: , Rfl:     multivitamin with minerals tablet, Take 1 tablet by mouth once daily., Disp: , Rfl:     naproxen (NAPROSYN) 500 MG tablet, Take 1 tablet (500 mg total) by mouth 2 (two) times daily., Disp: 60 tablet, Rfl: 1     Review of Systems:  Review of Systems   Constitutional: Negative for chills, fever, malaise/fatigue and weight loss.   HENT: Negative for ear pain, nosebleeds and sore throat.    Eyes: Negative for blurred vision, pain and redness.   Respiratory: Negative for cough, shortness of breath and wheezing.    Cardiovascular: Negative for chest pain, palpitations and leg swelling.   Gastrointestinal: Negative for abdominal pain, constipation, diarrhea, heartburn, nausea and vomiting.   Genitourinary: Negative for dysuria, flank pain, frequency and urgency.   Musculoskeletal: Positive for back pain. Negative for falls, joint pain, myalgias and neck pain.   Skin: Negative for itching and rash.   Neurological: Positive for tingling. Negative for dizziness, focal weakness, seizures, weakness and headaches.   Endo/Heme/Allergies: Does not bruise/bleed easily.   Psychiatric/Behavioral: Negative for depression and memory loss. The patient is not nervous/anxious and does not have insomnia.        Allergies:  Patient has no known allergies.     Medical History:   has a past medical history of Hypertension.    Surgical History:   has a past surgical history that includes Tubal ligation;  Ablation; and Transforaminal epidural injection of steroid (Right, 4/20/2022).  2010 fusion of cervical spine    Family History:  family history includes Breast cancer in her mother. Mother had a cervical fusion in 1995    Social History:   reports that she has never smoked. She does not have any smokeless tobacco history on file. She reports previous alcohol use. She reports that she does not use drugs.    Physical Exam:  /85   Pulse 70   Wt 81.6 kg (179 lb 14.3 oz)   BMI 29.04 kg/m²   GEN: No acute distress. Calm, comfortable  HENT: Normocephalic, atraumatic, moist mucous membranes  EYE: Anicteric sclera, non-injected.   CV: Non-diaphoretic.   RESP: Breathing comfortably. Chest expansion symmetric.  EXT: No clubbing, cyanosis.   SKIN: Warm, & dry to palpation. No visible rashes or lesions of exposed skin.   PSYCH: Pleasant mood and appropriate affect. Recent and remote memory intact.   GAIT: Independent ambulation  Lumbar Spine Exam:       Inspection: No erythema, bruising. No surgical incisions      Palpation: (+) TTP of sacroiliac joint on right.       ROM: Not Limited in flexion, extension, lateral bending      (+) Facet loading bilaterally      (+) Straight Leg Raise bilaterally on right      (+) Lasegue sign bilaterally on right      (-) OH bilaterally      (+) SIJ Compression Test on right  Hip Exam:      Inspection: No gross deformity or apparent leg length discrepancy      Palpation: +TTP of right gtb.       ROM:  full internal rotation, external rotation      (-) FADIR bilaterally  Neurologic Exam:     Alert. Speech is fluent and appropriate.     Strength: 5/5 throughout bilateral lower extremities     Sensation: Grossly intact to light touch in bilateral lower extremities     Reflexes: 2+ in b/l patella, achilles     Tone: No abnormality appreciated in bilateral lower extremities     No Clonus     Downgoing toes on plantar stimulation     (+) Hogue bilaterally                Imaging:  -  X-ray lumbar spine 10/14/21:  The alignment of the lumbar spine is normal.  The vertebral body heights are well maintained.  Disc space narrowing L5-S1.  No fracture, no osseous lesions.  The sacroiliac joints appear symmetrical on the AP view.  The soft tissues appear normal.    Labs:  BMP  Lab Results   Component Value Date     11/22/2021    K 4.8 11/22/2021     11/22/2021    CO2 23 11/22/2021    BUN 13 11/22/2021    CREATININE 0.74 11/22/2021    CALCIUM 9.4 11/22/2021    ANIONGAP 11 11/22/2021    ESTGFRAFRICA >60.0 11/22/2021    EGFRNONAA >60.0 11/22/2021     Lab Results   Component Value Date    ALT 20 11/22/2021    AST 26 11/22/2021    ALKPHOS 65 11/22/2021    BILITOT 0.7 11/22/2021     Lab Results   Component Value Date     11/22/2021       Assessment:  Anna Oakley is a 51 y.o. female with the following diagnoses based on history, exam, and imaging:    Problem List Items Addressed This Visit        Neuro    Lumbar radiculopathy - Primary      Other Visit Diagnoses     Lumbar radiculopathy, chronic        DDD (degenerative disc disease), lumbar        Pain              This is a pleasant 51 y.o. lady presenting with:     - Chronic low back pain with radiating symptoms into RLE. No weakness on exam. History and exam support her primary pain generator to be her right SI joint. She also has component of right GTB and possible lumbar radiculopathy.  - Comorbidities: HypoTH.     4/7/2022- 52 y/o with Hx of chronic low back, bilateral hip with radiating symptoms in her right leg. Upon Hx  And exam today I do believe she may have some right SI joint involvement. He primary issue could be NF or CS. I will order an Lumbar MRI today to rule out pathology. No weakness upon exam, + Patricks more on the right.     05/11/[id number]-bhtj-old female with a history of chronic low back pain with radicular symptoms into her anterior thigh that sometimes radiates into her lower extremities.  She is status  post a bilateral TF COLBY targeting L5-S1 that provided 0% relief.  Upon history and exam I have ruled out that her pain could possibly be bilateral SI joint inflammation, recommended that we attempt a lumbar interlaminar COLBY targeting L5-S1.  Her lumbar MRI shows at L5-S1 moderate to severe degenerative disc disease with high-grade disc space narrowing.  Type 1 endplate marrow signal changes.  She has no significant central canal or neural foraminal stenosis at this level.    Treatment Plan:   - PT/OT/HEP: Continue PT.  Discussed benefits of exercise for pain.   - Procedures:  Scheduled for lumbar COLBY interlaminar targeting L5-S1  - Medications:  Continue Naproxen 500mg BID PRN if helping if not discontinue. Educated patient about potential GI, cardiovascular, and renal risks and stressed importance of PRN usage.  - Imaging:  Reviewed  - Labs: Reviewed.  Medications are appropriately dosed for current hepatorenal function.    Follow Up:  2- 3 weeks after the injection    ADAM Jessica  Interventional Pain Management      Disclaimer: This note was partly generated using dictation software which may occasionally result in transcription errors.

## 2022-05-11 NOTE — PROGRESS NOTES
[facility]  Established Clinic Visit     Chief Complaint:   Chief Complaint   Patient presents with    Low-back Pain         History of Present Illness: Anna Oakley is a 51 y.o. female here for low back pain and right leg pain. She presents with a history of low back pain for about 10 years without history of trauma or inciting event. She pain is located in lower lumbar region and raditating pain to right thigh/leg; anterolateral thigh and back of right calf. Her pain has been slowly progressive and has worsened in the past 2 years. She states her pain is most exacerbated when extending spine from a flexed position.     Onset: years  Location: low back  Radiation: right leg  Timing: intermittent  Quality: Aching, Burning, Tingling, Numb, Sharp and Shooting  Exacerbating Factors: sitting  Alleviating Factors: medications  Associated Symptoms: denies urinary incontinence, bowel incontinence, significant weight loss, significant motor weakness and loss of sensations    Severity: Currently: 8/10   Typical Range: 4-10/10     Exacerbation: 10/10       Interval Updates: 5/11/2022 -  Adin returns to clinic s/p TFESI targeting L5-S1 on 4/20/22 with 0% relief.  She reports a pain intensity 10/10 today with a weekly range of 10-10/10.    Mr. Palma reports continued pain across her low back that radiates into her thighs in the L4/5 and L5/S1 dermatome distribution.  She is reporting paresthesia like symptoms in her anterior thighs and sometimes running down to her lower extremity.  She denies any profound weakness, denies any bowel bladder dysfunction, denies any saddle anesthesia denies any recent incident or trauma.        Previous Interventions:  - 04/20/2022-Bilateral L5  0% relief    Previous Therapies:  PT/OT: no   Relevant Surgery: no   Previous Medications:   - NSAIDS:   - Muscle Relaxants: Flexeril  - TCAs:  - SNRIs:   - Topicals:   - Anticonvulsants:  - Opioids: Hydrocodone    Current Pain  Medications:  1. Naproxen p.r.n.    Blood Thinners: None    Full Medication List:    Current Outpatient Medications:     cyanocobalamin 500 MCG tablet, Take 500 mcg by mouth once daily., Disp: , Rfl:     levothyroxine (SYNTHROID) 88 MCG tablet, Take 1 tablet (88 mcg total) by mouth once daily., Disp: 90 tablet, Rfl: 3    losartan (COZAAR) 100 MG tablet, Take 1 tablet (100 mg total) by mouth once daily., Disp: 90 tablet, Rfl: 3    medroxyPROGESTERone (DEPO-PROVERA) 150 mg/mL Syrg, INJECT 1ML INTRAMUSCULARLY EVERY 90 DAYS., Disp: , Rfl:     multivitamin with minerals tablet, Take 1 tablet by mouth once daily., Disp: , Rfl:     naproxen (NAPROSYN) 500 MG tablet, Take 1 tablet (500 mg total) by mouth 2 (two) times daily., Disp: 60 tablet, Rfl: 1     Review of Systems:  Review of Systems   Constitutional: Negative for chills, fever, malaise/fatigue and weight loss.   HENT: Negative for ear pain, nosebleeds and sore throat.    Eyes: Negative for blurred vision, pain and redness.   Respiratory: Negative for cough, shortness of breath and wheezing.    Cardiovascular: Negative for chest pain, palpitations and leg swelling.   Gastrointestinal: Negative for abdominal pain, constipation, diarrhea, heartburn, nausea and vomiting.   Genitourinary: Negative for dysuria, flank pain, frequency and urgency.   Musculoskeletal: Positive for back pain. Negative for falls, joint pain, myalgias and neck pain.   Skin: Negative for itching and rash.   Neurological: Positive for tingling. Negative for dizziness, focal weakness, seizures, weakness and headaches.   Endo/Heme/Allergies: Does not bruise/bleed easily.   Psychiatric/Behavioral: Negative for depression and memory loss. The patient is not nervous/anxious and does not have insomnia.        Allergies:  Patient has no known allergies.     Medical History:   has a past medical history of Hypertension.    Surgical History:   has a past surgical history that includes Tubal ligation;  Ablation; and Transforaminal epidural injection of steroid (Right, 4/20/2022).  2010 fusion of cervical spine    Family History:  family history includes Breast cancer in her mother. Mother had a cervical fusion in 1995    Social History:   reports that she has never smoked. She does not have any smokeless tobacco history on file. She reports previous alcohol use. She reports that she does not use drugs.    Physical Exam:  /85   Pulse 70   Wt 81.6 kg (179 lb 14.3 oz)   BMI 29.04 kg/m²   GEN: No acute distress. Calm, comfortable  HENT: Normocephalic, atraumatic, moist mucous membranes  EYE: Anicteric sclera, non-injected.   CV: Non-diaphoretic.   RESP: Breathing comfortably. Chest expansion symmetric.  EXT: No clubbing, cyanosis.   SKIN: Warm, & dry to palpation. No visible rashes or lesions of exposed skin.   PSYCH: Pleasant mood and appropriate affect. Recent and remote memory intact.   GAIT: Independent ambulation  Lumbar Spine Exam:       Inspection: No erythema, bruising. No surgical incisions      Palpation: (+) TTP of sacroiliac joint on right.       ROM: Not Limited in flexion, extension, lateral bending      (+) Facet loading bilaterally      (+) Straight Leg Raise bilaterally on right      (+) Lasegue sign bilaterally on right      (-) OH bilaterally      (+) SIJ Compression Test on right  Hip Exam:      Inspection: No gross deformity or apparent leg length discrepancy      Palpation: +TTP of right gtb.       ROM:  full internal rotation, external rotation      (-) FADIR bilaterally  Neurologic Exam:     Alert. Speech is fluent and appropriate.     Strength: 5/5 throughout bilateral lower extremities     Sensation: Grossly intact to light touch in bilateral lower extremities     Reflexes: 2+ in b/l patella, achilles     Tone: No abnormality appreciated in bilateral lower extremities     No Clonus     Downgoing toes on plantar stimulation     (+) Hogue bilaterally                Imaging:  -  X-ray lumbar spine 10/14/21:  The alignment of the lumbar spine is normal.  The vertebral body heights are well maintained.  Disc space narrowing L5-S1.  No fracture, no osseous lesions.  The sacroiliac joints appear symmetrical on the AP view.  The soft tissues appear normal.    Labs:  BMP  Lab Results   Component Value Date     11/22/2021    K 4.8 11/22/2021     11/22/2021    CO2 23 11/22/2021    BUN 13 11/22/2021    CREATININE 0.74 11/22/2021    CALCIUM 9.4 11/22/2021    ANIONGAP 11 11/22/2021    ESTGFRAFRICA >60.0 11/22/2021    EGFRNONAA >60.0 11/22/2021     Lab Results   Component Value Date    ALT 20 11/22/2021    AST 26 11/22/2021    ALKPHOS 65 11/22/2021    BILITOT 0.7 11/22/2021     Lab Results   Component Value Date     11/22/2021       Assessment:  Anna Oakley is a 51 y.o. female with the following diagnoses based on history, exam, and imaging:    Problem List Items Addressed This Visit        Neuro    Lumbar radiculopathy - Primary      Other Visit Diagnoses     Lumbar radiculopathy, chronic        DDD (degenerative disc disease), lumbar        Pain              This is a pleasant 51 y.o. lady presenting with:     - Chronic low back pain with radiating symptoms into RLE. No weakness on exam. History and exam support her primary pain generator to be her right SI joint. She also has component of right GTB and possible lumbar radiculopathy.  - Comorbidities: HypoTH.     4/7/2022- 50 y/o with Hx of chronic low back, bilateral hip with radiating symptoms in her right leg. Upon Hx  And exam today I do believe she may have some right SI joint involvement. He primary issue could be NF or CS. I will order an Lumbar MRI today to rule out pathology. No weakness upon exam, + Patricks more on the right.     05/11/[id number]-zfib-old female with a history of chronic low back pain with radicular symptoms into her anterior thigh that sometimes radiates into her lower extremities.  She is status  post a bilateral TF COLBY targeting L5-S1 that provided 0% relief.  Upon history and exam I have ruled out that her pain could possibly be bilateral SI joint inflammation, recommended that we attempt a lumbar interlaminar COLBY targeting L5-S1.  Her lumbar MRI shows at L5-S1 moderate to severe degenerative disc disease with high-grade disc space narrowing.  Type 1 endplate marrow signal changes.  She has no significant central canal or neural foraminal stenosis at this level.    Treatment Plan:   - PT/OT/HEP: Continue PT.  Discussed benefits of exercise for pain.   - Procedures:  Scheduled for lumbar COLBY interlaminar targeting L5-S1  - Medications:  Continue Naproxen 500mg BID PRN if helping if not discontinue. Educated patient about potential GI, cardiovascular, and renal risks and stressed importance of PRN usage.  - Imaging:  Reviewed  - Labs: Reviewed.  Medications are appropriately dosed for current hepatorenal function.    Follow Up:  2- 3 weeks after the injection    ADAM Jessica  Interventional Pain Management      Disclaimer: This note was partly generated using dictation software which may occasionally result in transcription errors.

## 2022-05-24 NOTE — DISCHARGE INSTRUCTIONS
Home Care Instructions Pain Management:    1.  DIET:    You may resume your normal diet today.    2.  BATHING:    You may shower with luke warm water.    3.  DRESSING:    You may remove your bandage today.    4.  ACTIVITY LEVEL:      You may resume your normal activities 24 hours after your procedure.    5.  MEDICATIONS:    You may resume your normal medications today.    6.  SPECIAL INSTRUCTIONS:    No heat to the injection site for 24 hours including bath or shower, heating pad, moist heat or hot tubs.    Use an ice pack to the injection site for any pain or discomfort.  Apply ice packs for 20 minute intervals as needed.    If you have received any sedatives by mouth today, you can not drive for 12 hours.    If you have received sedation through an IV, you can not drive for 24 hours.    PLEASE CALL YOUR DOCTOR FOR THE FOLLOWIN.  Redness or swelling around the injection site.  2.  Fever of 101 degrees.  3.  Drainage (pus) from the injection site.  4.  For any continuous bleeding (some dried blood over the incision is normal.)    FOR EMERGENCIES:    If any unusual problems or difficulties occur during clinic hours, call [phone] or dial .    Follow up with with your physician in 2-3 weeks.

## 2022-05-25 ENCOUNTER — HOSPITAL ENCOUNTER (OUTPATIENT)
Facility: HOSPITAL | Age: 52
Discharge: HOME | End: 2022-05-25
Attending: PHYSICAL MEDICINE & REHABILITATION | Admitting: PHYSICAL MEDICINE & REHABILITATION
Payer: COMMERCIAL

## 2022-05-25 VITALS
TEMPERATURE: 97 F | OXYGEN SATURATION: 98 % | HEART RATE: 85 BPM | DIASTOLIC BLOOD PRESSURE: 90 MMHG | RESPIRATION RATE: 16 BRPM | BODY MASS INDEX: 29.25 KG/M2 | SYSTOLIC BLOOD PRESSURE: 136 MMHG | WEIGHT: 182 LBS | HEIGHT: 66 IN

## 2022-05-25 DIAGNOSIS — R52 PAIN: ICD-10-CM

## 2022-05-25 DIAGNOSIS — M54.16 LUMBAR RADICULOPATHY: Primary | ICD-10-CM

## 2022-05-25 PROCEDURE — 62323 NJX INTERLAMINAR LMBR/SAC: CPT | Performed by: PHYSICAL MEDICINE & REHABILITATION

## 2022-05-25 PROCEDURE — 63600175 PHARM REV CODE 636 W HCPCS: Performed by: PHYSICAL MEDICINE & REHABILITATION

## 2022-05-25 PROCEDURE — 25000003 PHARM REV CODE 250: Performed by: PHYSICAL MEDICINE & REHABILITATION

## 2022-05-25 PROCEDURE — 62323 NJX INTERLAMINAR LMBR/SAC: CPT | Mod: ,,, | Performed by: PHYSICAL MEDICINE & REHABILITATION

## 2022-05-25 PROCEDURE — 62323 PR INJ LUMBAR/SACRAL, W/IMAGING GUIDANCE: ICD-10-PCS | Mod: ,,, | Performed by: PHYSICAL MEDICINE & REHABILITATION

## 2022-05-25 PROCEDURE — 25500020 PHARM REV CODE 255: Performed by: PHYSICAL MEDICINE & REHABILITATION

## 2022-05-25 RX ORDER — INDOMETHACIN 25 MG/1
CAPSULE ORAL
Status: DISCONTINUED | OUTPATIENT
Start: 2022-05-25 | End: 2022-05-25 | Stop reason: HOSPADM

## 2022-05-25 RX ORDER — LIDOCAINE HYDROCHLORIDE 10 MG/ML
INJECTION, SOLUTION EPIDURAL; INFILTRATION; INTRACAUDAL; PERINEURAL
Status: DISCONTINUED | OUTPATIENT
Start: 2022-05-25 | End: 2022-05-25 | Stop reason: HOSPADM

## 2022-05-25 RX ORDER — LIDOCAINE HYDROCHLORIDE 10 MG/ML
INJECTION INFILTRATION; PERINEURAL
Status: DISCONTINUED | OUTPATIENT
Start: 2022-05-25 | End: 2022-05-25 | Stop reason: HOSPADM

## 2022-05-25 RX ORDER — DEXAMETHASONE SODIUM PHOSPHATE 4 MG/ML
INJECTION, SOLUTION INTRA-ARTICULAR; INTRALESIONAL; INTRAMUSCULAR; INTRAVENOUS; SOFT TISSUE
Status: DISCONTINUED | OUTPATIENT
Start: 2022-05-25 | End: 2022-05-25 | Stop reason: HOSPADM

## 2022-05-25 NOTE — OP NOTE
Lumbar Interlaminar Epidural Steroid Injection Under Fluoroscopic Guidance:  I have reviewed the patient's medications, allergies and relevant histories prior to the procedure and no contraindications have been identified. The risks, benefits and alternatives to the procedure were discussed with the patient, and all questions regarding the procedure were answered to the patient's satisfaction. I personally obtained Anna's consent prior to the start of the procedure and the signed consent can be found in the patient's chart.                                                         Time-out was taken to identify patient, procedure, laterality, and allergies prior to starting the procedure.       Date of Service: 05/25/2022  Procedure: L5-S1 Interlaminar Epidural Steroid Injection under fluoroscopic guidance  Pre-Operative Diagnosis: Lumbar Radiculopathy  Post-Operative Diagnosis: Lumbar Radiculopathy    Physician: Janee Johnson M.D.  Assistants: None    Medications Injected: Preservative-free dexamethasone 10 mg/mL with 5 mL of sterile Xylocaine-MPF 1%.  Local Anesthetic: Xylocaine 1% 10 mL with Sodium Bicarbonate 1ml.   Sedation Medications: None.    Procedural Technique:   Laying in a prone position, the patient was prepped and draped in the usual sterile fashion using ChloraPrep and fenestrated drape.  Continuous hemodynamic monitoring was initiated including blood pressure, EKG, and pulse oximetry.  The interlaminar area of interest was determined under fluoroscopic guidance.  Local anesthetic was utilized to anesthetize the skin and subcutaneous tissues in the area using a 25-gauge needle.  A 3.5 inch 18-gauge Touhy needle was introduced under fluoroscopic guidance.  It met the inferior lamina. The needle was then hinged superiorly above the lamina.  Loss of resistance technique was employed while advancing the needle.  Once in the desired position, 2-3 mL of contrast, Omnipaque, was injected to confirm epidural  placement and that there was no vascular runoff. The medication was then injected slowly.  The needle was removed and bandage applied to the area.    Estimated Blood Loss:  None.  Complications:  None.     Disposition: The patient tolerated the procedure well, and there were no apparent complications. Vital signs remained stable throughout the procedure. The patient was taken to the recovery area and monitored. The patient was supplied with written discharge instructions for the procedure. If helpful, we can repeat as needed. The patient was discharged in a stable condition.    Follow-Up: We will see the patient back 2-4 weeks.

## 2022-05-25 NOTE — DISCHARGE SUMMARY
[facility]  Discharge Note  Short Stay     Admit Date: 5/25/2022    Discharge Date: 5/25/2022     Attending Physician: Janee Johnson M.D.    Diagnoses:  Active Hospital Problems    Diagnosis  POA    *Lumbar radiculopathy [M54.16]  Yes      Resolved Hospital Problems   No resolved problems to display.     Discharged Condition: Good     Hospital Course: Patient was admitted for an outpatient interventional pain management procedure and tolerated the procedure well with no complications.     Final Diagnoses: Same as principal problem.     Disposition: Home or Self Care     Follow up/Patient Instructions:   Follow-up in 1-2 weeks unless otherwise instructed. May return sooner as needed.       Reconciled Medications:     Medication List      CONTINUE taking these medications    cyanocobalamin 500 MCG tablet  Take 500 mcg by mouth once daily.     levothyroxine 88 MCG tablet  Commonly known as: SYNTHROID  Take 1 tablet (88 mcg total) by mouth once daily.     losartan 100 MG tablet  Commonly known as: COZAAR  Take 1 tablet (100 mg total) by mouth once daily.     medroxyPROGESTERone 150 mg/mL Syrg  Commonly known as: DEPO-PROVERA  INJECT 1ML INTRAMUSCULARLY EVERY 90 DAYS.     multivitamin with minerals tablet  Take 1 tablet by mouth once daily.     naproxen 500 MG tablet  Commonly known as: NAPROSYN  Take 1 tablet (500 mg total) by mouth 2 (two) times daily.           Discharge Procedure Orders (must include Diet, Follow-up, Activity)   Ice to affected area   Order Comments: 20 minutes of ice or until area numb to the touch if area is sore 2-3 times per day as needed     No driving until:   Order Comments: Until following day     No dressing needed     Notify your health care provider if you experience any of the following:  temperature >100.4     Notify your health care provider if you experience any of the following:  persistent nausea and vomiting or diarrhea     Notify your health care provider if you  experience any of the following:  severe uncontrolled pain     Notify your health care provider if you experience any of the following:  redness, tenderness, or signs of infection (pain, swelling, redness, odor or green/yellow discharge around incision site)     Notify your health care provider if you experience any of the following:  difficulty breathing or increased cough     Notify your health care provider if you experience any of the following:  severe persistent headache     Notify your health care provider if you experience any of the following:  worsening rash     Notify your health care provider if you experience any of the following:  persistent dizziness, light-headedness, or visual disturbances     Notify your health care provider if you experience any of the following:  increased confusion or weakness     Shower on day dressing removed (No bath)       Janee Johnson M.D.  Interventional Pain Medicine / Physical Medicine & Rehabilitation

## 2022-05-31 ENCOUNTER — PATIENT MESSAGE (OUTPATIENT)
Dept: ADMINISTRATIVE | Facility: HOSPITAL | Age: 52
End: 2022-05-31
Payer: COMMERCIAL

## 2022-06-22 ENCOUNTER — OFFICE VISIT (OUTPATIENT)
Dept: PAIN MEDICINE | Facility: CLINIC | Age: 52
End: 2022-06-22
Payer: COMMERCIAL

## 2022-06-22 VITALS
BODY MASS INDEX: 29.39 KG/M2 | HEART RATE: 78 BPM | SYSTOLIC BLOOD PRESSURE: 123 MMHG | WEIGHT: 182.13 LBS | DIASTOLIC BLOOD PRESSURE: 74 MMHG

## 2022-06-22 DIAGNOSIS — M47.819 ARTHROPATHY OF FACET JOINTS AT MULTIPLE LEVELS: ICD-10-CM

## 2022-06-22 DIAGNOSIS — M51.36 DDD (DEGENERATIVE DISC DISEASE), LUMBAR: ICD-10-CM

## 2022-06-22 DIAGNOSIS — G89.29 CHRONIC LOW BACK PAIN WITHOUT SCIATICA, UNSPECIFIED BACK PAIN LATERALITY: ICD-10-CM

## 2022-06-22 DIAGNOSIS — M47.9 SPONDYLOSIS: Primary | ICD-10-CM

## 2022-06-22 DIAGNOSIS — M54.50 CHRONIC LOW BACK PAIN WITHOUT SCIATICA, UNSPECIFIED BACK PAIN LATERALITY: ICD-10-CM

## 2022-06-22 PROCEDURE — 3008F PR BODY MASS INDEX (BMI) DOCUMENTED: ICD-10-PCS | Mod: CPTII,S$GLB,, | Performed by: NURSE PRACTITIONER

## 2022-06-22 PROCEDURE — 3078F PR MOST RECENT DIASTOLIC BLOOD PRESSURE < 80 MM HG: ICD-10-PCS | Mod: CPTII,S$GLB,, | Performed by: NURSE PRACTITIONER

## 2022-06-22 PROCEDURE — 3008F BODY MASS INDEX DOCD: CPT | Mod: CPTII,S$GLB,, | Performed by: NURSE PRACTITIONER

## 2022-06-22 PROCEDURE — 3078F DIAST BP <80 MM HG: CPT | Mod: CPTII,S$GLB,, | Performed by: NURSE PRACTITIONER

## 2022-06-22 PROCEDURE — 4010F ACE/ARB THERAPY RXD/TAKEN: CPT | Mod: CPTII,S$GLB,, | Performed by: NURSE PRACTITIONER

## 2022-06-22 PROCEDURE — 99214 OFFICE O/P EST MOD 30 MIN: CPT | Mod: S$GLB,,, | Performed by: NURSE PRACTITIONER

## 2022-06-22 PROCEDURE — 3074F SYST BP LT 130 MM HG: CPT | Mod: CPTII,S$GLB,, | Performed by: NURSE PRACTITIONER

## 2022-06-22 PROCEDURE — 99214 PR OFFICE/OUTPT VISIT, EST, LEVL IV, 30-39 MIN: ICD-10-PCS | Mod: S$GLB,,, | Performed by: NURSE PRACTITIONER

## 2022-06-22 PROCEDURE — 1159F PR MEDICATION LIST DOCUMENTED IN MEDICAL RECORD: ICD-10-PCS | Mod: CPTII,S$GLB,, | Performed by: NURSE PRACTITIONER

## 2022-06-22 PROCEDURE — 1160F RVW MEDS BY RX/DR IN RCRD: CPT | Mod: CPTII,S$GLB,, | Performed by: NURSE PRACTITIONER

## 2022-06-22 PROCEDURE — 3074F PR MOST RECENT SYSTOLIC BLOOD PRESSURE < 130 MM HG: ICD-10-PCS | Mod: CPTII,S$GLB,, | Performed by: NURSE PRACTITIONER

## 2022-06-22 PROCEDURE — 4010F PR ACE/ARB THEARPY RXD/TAKEN: ICD-10-PCS | Mod: CPTII,S$GLB,, | Performed by: NURSE PRACTITIONER

## 2022-06-22 PROCEDURE — 1160F PR REVIEW ALL MEDS BY PRESCRIBER/CLIN PHARMACIST DOCUMENTED: ICD-10-PCS | Mod: CPTII,S$GLB,, | Performed by: NURSE PRACTITIONER

## 2022-06-22 PROCEDURE — 99999 PR PBB SHADOW E&M-EST. PATIENT-LVL III: CPT | Mod: PBBFAC,,, | Performed by: NURSE PRACTITIONER

## 2022-06-22 PROCEDURE — 1159F MED LIST DOCD IN RCRD: CPT | Mod: CPTII,S$GLB,, | Performed by: NURSE PRACTITIONER

## 2022-06-22 PROCEDURE — 99999 PR PBB SHADOW E&M-EST. PATIENT-LVL III: ICD-10-PCS | Mod: PBBFAC,,, | Performed by: NURSE PRACTITIONER

## 2022-06-22 NOTE — PROGRESS NOTES
[facility]  Established Clinic Visit     Chief Complaint:   Chief Complaint   Patient presents with    Low-back Pain         History of Present Illness: Anna Oakley is a 51 y.o. female here for low back pain and right leg pain. She presents with a history of low back pain for about 10 years without history of trauma or inciting event. She pain is located in lower lumbar region and raditating pain to right thigh/leg; anterolateral thigh and back of right calf. Her pain has been slowly progressive and has worsened in the past 2 years. She states her pain is most exacerbated when extending spine from a flexed position.     Onset: years  Location: low back  Radiation: right leg  Timing: intermittent  Quality: Aching, Burning, Tingling, Numb, Sharp and Shooting  Exacerbating Factors: sitting  Alleviating Factors: medications  Associated Symptoms: denies urinary incontinence, bowel incontinence, significant weight loss, significant motor weakness and loss of sensations    Severity: Currently: 8/10   Typical Range: 4-10/10     Exacerbation: 10/10     Interval History (06/22/2022):  Anna Oakley returns today for follow up.  At the last clinic visit,  She was s/f a Lumbar ILESI targeting L5-S1 that  provided 70% relief with improved functionaliy especially in her legs, however she is still complaning of axial low back pain that  Presents in a bandlike distribution.   Currently, the LBP continues leg pain is much better following Lumbar COLBY      No significant interval events or traumas. No change in bowel or bladder function, & no new weakness or numbness is reported. No new musculoskeletal pain complaints.    Current Pain Scales:  Current: 0/10              Typical Range: 0-0/10      Previous Interventions:  -05/25/2022  L5-S1 Interlaminar Epidural Steroid Injection under   - 04/20/2022-Bilateral L5  0% relief    Previous Therapies:  PT/OT: no   Relevant Surgery: no   Previous Medications:    - NSAIDS:   - Muscle Relaxants: Flexeril  - TCAs:  - SNRIs:   - Topicals:   - Anticonvulsants:  - Opioids: Hydrocodone    Current Pain Medications:  1. Naproxen p.r.n.    Blood Thinners: None    Full Medication List:    Current Outpatient Medications:     cyanocobalamin 500 MCG tablet, Take 500 mcg by mouth once daily., Disp: , Rfl:     levothyroxine (SYNTHROID) 88 MCG tablet, Take 1 tablet (88 mcg total) by mouth once daily., Disp: 90 tablet, Rfl: 3    losartan (COZAAR) 100 MG tablet, Take 1 tablet (100 mg total) by mouth once daily., Disp: 90 tablet, Rfl: 3    medroxyPROGESTERone (DEPO-PROVERA) 150 mg/mL Syrg, INJECT 1ML INTRAMUSCULARLY EVERY 90 DAYS., Disp: , Rfl:     multivitamin with minerals tablet, Take 1 tablet by mouth once daily., Disp: , Rfl:     naproxen (NAPROSYN) 500 MG tablet, Take 1 tablet (500 mg total) by mouth 2 (two) times daily., Disp: 60 tablet, Rfl: 1     Review of Systems:  Review of Systems   Constitutional: Negative for chills, fever, malaise/fatigue and weight loss.   HENT: Negative for ear pain, nosebleeds and sore throat.    Eyes: Negative for blurred vision, pain and redness.   Respiratory: Negative for cough, shortness of breath and wheezing.    Cardiovascular: Negative for chest pain, palpitations and leg swelling.   Gastrointestinal: Negative for abdominal pain, constipation, diarrhea, heartburn, nausea and vomiting.   Genitourinary: Negative for dysuria, flank pain, frequency and urgency.   Musculoskeletal: Positive for back pain. Negative for falls, joint pain, myalgias and neck pain.   Skin: Negative for itching and rash.   Neurological: Positive for tingling. Negative for dizziness, focal weakness, seizures, weakness and headaches.   Endo/Heme/Allergies: Does not bruise/bleed easily.   Psychiatric/Behavioral: Negative for depression and memory loss. The patient is not nervous/anxious and does not have insomnia.        Allergies:  Patient has no known allergies.      Medical History:   has a past medical history of Hypertension.    Surgical History:   has a past surgical history that includes Tubal ligation; Ablation; Transforaminal epidural injection of steroid (Right, 4/20/2022); and Epidural steroid injection into lumbar spine (N/A, 5/25/2022).  2010 fusion of cervical spine    Family History:  family history includes Breast cancer in her mother. Mother had a cervical fusion in 1995    Social History:   reports that she has never smoked. She does not have any smokeless tobacco history on file. She reports previous alcohol use. She reports that she does not use drugs.    Physical Exam:  /74   Pulse 78   Wt 82.6 kg (182 lb 1.6 oz)   BMI 29.39 kg/m²   GEN: No acute distress. Calm, comfortable  HENT: Normocephalic, atraumatic, moist mucous membranes  EYE: Anicteric sclera, non-injected.   CV: Non-diaphoretic.   RESP: Breathing comfortably. Chest expansion symmetric.  EXT: No clubbing, cyanosis.   SKIN: Warm, & dry to palpation. No visible rashes or lesions of exposed skin.   PSYCH: Pleasant mood and appropriate affect. Recent and remote memory intact.   GAIT: Independent ambulation  Lumbar Spine Exam:       Inspection: No erythema, bruising. No surgical incisions      Palpation: (+) TTP of sacroiliac joint on right.       ROM: Not Limited in flexion, extension, lateral bending      (+) Facet loading bilaterally      (+) Straight Leg Raise bilaterally on right      (+) Lasegue sign bilaterally on right      (-) OH bilaterally      (+) SIJ Compression Test on right  Hip Exam:      Inspection: No gross deformity or apparent leg length discrepancy      Palpation: +TTP of right gtb.       ROM:  full internal rotation, external rotation      (-) FADIR bilaterally  Neurologic Exam:     Alert. Speech is fluent and appropriate.     Strength: 5/5 throughout bilateral lower extremities     Sensation: Grossly intact to light touch in bilateral lower extremities     Reflexes:  2+ in b/l patella, achilles     Tone: No abnormality appreciated in bilateral lower extremities     No Clonus     Downgoing toes on plantar stimulation     (+) [place] bilaterally                Imaging:  - X-ray lumbar spine 10/14/21:  The alignment of the lumbar spine is normal.  The vertebral body heights are well maintained.  Disc space narrowing L5-S1.  No fracture, no osseous lesions.  The sacroiliac joints appear symmetrical on the AP view.  The soft tissues appear normal.    Labs:  BMP  Lab Results   Component Value Date     11/22/2021    K 4.8 11/22/2021     11/22/2021    CO2 23 11/22/2021    BUN 13 11/22/2021    CREATININE 0.74 11/22/2021    CALCIUM 9.4 11/22/2021    ANIONGAP 11 11/22/2021    ESTGFRAFRICA >60.0 11/22/2021    EGFRNONAA >60.0 11/22/2021     Lab Results   Component Value Date    ALT 20 11/22/2021    AST 26 11/22/2021    ALKPHOS 65 11/22/2021    BILITOT 0.7 11/22/2021     Lab Results   Component Value Date     11/22/2021       Assessment:  Anna Oakley is a 51 y.o. female with the following diagnoses based on history, exam, and imaging:    Problem List Items Addressed This Visit    None     Visit Diagnoses     Spondylosis    -  Primary    Arthropathy of facet joints at multiple levels        Chronic low back pain without sciatica, unspecified back pain laterality        DDD (degenerative disc disease), lumbar              This is a pleasant 51 y.o. lady presenting with:     - Chronic low back pain with radiating symptoms into RLE. No weakness on exam. History and exam support her primary pain generator to be her right SI joint. She also has component of right GTB and possible lumbar radiculopathy.  - Comorbidities: HypoTH.     4/7/2022- 50 y/o with Hx of chronic low back, bilateral hip with radiating symptoms in her right leg. Upon Hx  And exam today I do believe she may have some right SI joint involvement. He primary issue could be NF or CS. I will order an Lumbar  MRI today to rule out pathology. No weakness upon exam, + Patricks more on the right.     05/11/[id number]-nuhx-old female with a history of chronic low back pain with radicular symptoms into her anterior thigh that sometimes radiates into her lower extremities.  She is status post a bilateral TF COLBY targeting L5-S1 that provided 0% relief.  Upon history and exam I have ruled out that her pain could possibly be bilateral SI joint inflammation, recommended that we attempt a lumbar interlaminar COLBY targeting L5-S1.  Her lumbar MRI shows at L5-S1 moderate to severe degenerative disc disease with high-grade disc space narrowing.  Type 1 endplate marrow signal changes.  She has no significant central canal or neural foraminal stenosis at this level.    6/22/2022- 52 y/o female with a hx of CLBP with right radicular symptoms presents today following a Lumbar COLBY targeting L5-S1. She reported improved rihgt leg pain, but her low back pain continues.  Her MRI is significant for multilevel facet arthritis beginning at L2 to S1. Her pain continues in the area of L4/5 and L5/S1 across her low back.     Treatment Plan:   - PT/OT/HEP: Continue PT.  Discussed benefits of exercise for   pain.   - Procedures:  S/f a diagnostic Lumbar MBB targeting L4/5 and L5/S1 x2 and RFA if appropriate.   - Medications:  Continue Naproxen 500mg BID PRN if helping if not discontinue. Educated patient about potential GI, cardiovascular, and renal risks and stressed importance of PRN usage.  - Imaging:  Reviewed  - Labs: Reviewed.  Medications are appropriately dosed for current hepatorenal function.    Follow Up: virtually or we will call patient for MBB results.     HERIBERTO JessicaC  Interventional Pain Management      Disclaimer: This note was partly generated using dictation software which may occasionally result in transcription errors.

## 2022-06-22 NOTE — H&P (VIEW-ONLY)
[facility]  Established Clinic Visit     Chief Complaint:   Chief Complaint   Patient presents with    Low-back Pain         History of Present Illness: Anna Oakley is a 51 y.o. female here for low back pain and right leg pain. She presents with a history of low back pain for about 10 years without history of trauma or inciting event. She pain is located in lower lumbar region and raditating pain to right thigh/leg; anterolateral thigh and back of right calf. Her pain has been slowly progressive and has worsened in the past 2 years. She states her pain is most exacerbated when extending spine from a flexed position.     Onset: years  Location: low back  Radiation: right leg  Timing: intermittent  Quality: Aching, Burning, Tingling, Numb, Sharp and Shooting  Exacerbating Factors: sitting  Alleviating Factors: medications  Associated Symptoms: denies urinary incontinence, bowel incontinence, significant weight loss, significant motor weakness and loss of sensations    Severity: Currently: 8/10   Typical Range: 4-10/10     Exacerbation: 10/10     Interval History (06/22/2022):  Anna Oakley returns today for follow up.  At the last clinic visit,  She was s/f a Lumbar ILESI targeting L5-S1 that  provided 70% relief with improved functionaliy especially in her legs, however she is still complaning of axial low back pain that  Presents in a bandlike distribution.   Currently, the LBP continues leg pain is much better following Lumbar COLBY      No significant interval events or traumas. No change in bowel or bladder function, & no new weakness or numbness is reported. No new musculoskeletal pain complaints.    Current Pain Scales:  Current: 0/10              Typical Range: 0-0/10      Previous Interventions:  -05/25/2022  L5-S1 Interlaminar Epidural Steroid Injection under   - 04/20/2022-Bilateral L5  0% relief    Previous Therapies:  PT/OT: no   Relevant Surgery: no   Previous Medications:    - NSAIDS:   - Muscle Relaxants: Flexeril  - TCAs:  - SNRIs:   - Topicals:   - Anticonvulsants:  - Opioids: Hydrocodone    Current Pain Medications:  1. Naproxen p.r.n.    Blood Thinners: None    Full Medication List:    Current Outpatient Medications:     cyanocobalamin 500 MCG tablet, Take 500 mcg by mouth once daily., Disp: , Rfl:     levothyroxine (SYNTHROID) 88 MCG tablet, Take 1 tablet (88 mcg total) by mouth once daily., Disp: 90 tablet, Rfl: 3    losartan (COZAAR) 100 MG tablet, Take 1 tablet (100 mg total) by mouth once daily., Disp: 90 tablet, Rfl: 3    medroxyPROGESTERone (DEPO-PROVERA) 150 mg/mL Syrg, INJECT 1ML INTRAMUSCULARLY EVERY 90 DAYS., Disp: , Rfl:     multivitamin with minerals tablet, Take 1 tablet by mouth once daily., Disp: , Rfl:     naproxen (NAPROSYN) 500 MG tablet, Take 1 tablet (500 mg total) by mouth 2 (two) times daily., Disp: 60 tablet, Rfl: 1     Review of Systems:  Review of Systems   Constitutional: Negative for chills, fever, malaise/fatigue and weight loss.   HENT: Negative for ear pain, nosebleeds and sore throat.    Eyes: Negative for blurred vision, pain and redness.   Respiratory: Negative for cough, shortness of breath and wheezing.    Cardiovascular: Negative for chest pain, palpitations and leg swelling.   Gastrointestinal: Negative for abdominal pain, constipation, diarrhea, heartburn, nausea and vomiting.   Genitourinary: Negative for dysuria, flank pain, frequency and urgency.   Musculoskeletal: Positive for back pain. Negative for falls, joint pain, myalgias and neck pain.   Skin: Negative for itching and rash.   Neurological: Positive for tingling. Negative for dizziness, focal weakness, seizures, weakness and headaches.   Endo/Heme/Allergies: Does not bruise/bleed easily.   Psychiatric/Behavioral: Negative for depression and memory loss. The patient is not nervous/anxious and does not have insomnia.        Allergies:  Patient has no known allergies.      Medical History:   has a past medical history of Hypertension.    Surgical History:   has a past surgical history that includes Tubal ligation; Ablation; Transforaminal epidural injection of steroid (Right, 4/20/2022); and Epidural steroid injection into lumbar spine (N/A, 5/25/2022).  2010 fusion of cervical spine    Family History:  family history includes Breast cancer in her mother. Mother had a cervical fusion in 1995    Social History:   reports that she has never smoked. She does not have any smokeless tobacco history on file. She reports previous alcohol use. She reports that she does not use drugs.    Physical Exam:  /74   Pulse 78   Wt 82.6 kg (182 lb 1.6 oz)   BMI 29.39 kg/m²   GEN: No acute distress. Calm, comfortable  HENT: Normocephalic, atraumatic, moist mucous membranes  EYE: Anicteric sclera, non-injected.   CV: Non-diaphoretic.   RESP: Breathing comfortably. Chest expansion symmetric.  EXT: No clubbing, cyanosis.   SKIN: Warm, & dry to palpation. No visible rashes or lesions of exposed skin.   PSYCH: Pleasant mood and appropriate affect. Recent and remote memory intact.   GAIT: Independent ambulation  Lumbar Spine Exam:       Inspection: No erythema, bruising. No surgical incisions      Palpation: (+) TTP of sacroiliac joint on right.       ROM: Not Limited in flexion, extension, lateral bending      (+) Facet loading bilaterally      (+) Straight Leg Raise bilaterally on right      (+) Lasegue sign bilaterally on right      (-) OH bilaterally      (+) SIJ Compression Test on right  Hip Exam:      Inspection: No gross deformity or apparent leg length discrepancy      Palpation: +TTP of right gtb.       ROM:  full internal rotation, external rotation      (-) FADIR bilaterally  Neurologic Exam:     Alert. Speech is fluent and appropriate.     Strength: 5/5 throughout bilateral lower extremities     Sensation: Grossly intact to light touch in bilateral lower extremities     Reflexes:  2+ in b/l patella, achilles     Tone: No abnormality appreciated in bilateral lower extremities     No Clonus     Downgoing toes on plantar stimulation     (+) Hogue bilaterally                Imaging:  - X-ray lumbar spine 10/14/21:  The alignment of the lumbar spine is normal.  The vertebral body heights are well maintained.  Disc space narrowing L5-S1.  No fracture, no osseous lesions.  The sacroiliac joints appear symmetrical on the AP view.  The soft tissues appear normal.    Labs:  BMP  Lab Results   Component Value Date     11/22/2021    K 4.8 11/22/2021     11/22/2021    CO2 23 11/22/2021    BUN 13 11/22/2021    CREATININE 0.74 11/22/2021    CALCIUM 9.4 11/22/2021    ANIONGAP 11 11/22/2021    ESTGFRAFRICA >60.0 11/22/2021    EGFRNONAA >60.0 11/22/2021     Lab Results   Component Value Date    ALT 20 11/22/2021    AST 26 11/22/2021    ALKPHOS 65 11/22/2021    BILITOT 0.7 11/22/2021     Lab Results   Component Value Date     11/22/2021       Assessment:  Anna Oakley is a 51 y.o. female with the following diagnoses based on history, exam, and imaging:    Problem List Items Addressed This Visit    None     Visit Diagnoses     Spondylosis    -  Primary    Arthropathy of facet joints at multiple levels        Chronic low back pain without sciatica, unspecified back pain laterality        DDD (degenerative disc disease), lumbar              This is a pleasant 51 y.o. lady presenting with:     - Chronic low back pain with radiating symptoms into RLE. No weakness on exam. History and exam support her primary pain generator to be her right SI joint. She also has component of right GTB and possible lumbar radiculopathy.  - Comorbidities: HypoTH.     4/7/2022- 52 y/o with Hx of chronic low back, bilateral hip with radiating symptoms in her right leg. Upon Hx  And exam today I do believe she may have some right SI joint involvement. He primary issue could be NF or CS. I will order an Lumbar  MRI today to rule out pathology. No weakness upon exam, + Patricks more on the right.     05/11/[id number]-uxxw-old female with a history of chronic low back pain with radicular symptoms into her anterior thigh that sometimes radiates into her lower extremities.  She is status post a bilateral TF COLBY targeting L5-S1 that provided 0% relief.  Upon history and exam I have ruled out that her pain could possibly be bilateral SI joint inflammation, recommended that we attempt a lumbar interlaminar COLBY targeting L5-S1.  Her lumbar MRI shows at L5-S1 moderate to severe degenerative disc disease with high-grade disc space narrowing.  Type 1 endplate marrow signal changes.  She has no significant central canal or neural foraminal stenosis at this level.    6/22/2022- 50 y/o female with a hx of CLBP with right radicular symptoms presents today following a Lumbar COLBY targeting L5-S1. She reported improved rihgt leg pain, but her low back pain continues.  Her MRI is significant for multilevel facet arthritis beginning at L2 to S1. Her pain continues in the area of L4/5 and L5/S1 across her low back.     Treatment Plan:   - PT/OT/HEP: Continue PT.  Discussed benefits of exercise for   pain.   - Procedures:  S/f a diagnostic Lumbar MBB targeting L4/5 and L5/S1 x2 and RFA if appropriate.   - Medications:  Continue Naproxen 500mg BID PRN if helping if not discontinue. Educated patient about potential GI, cardiovascular, and renal risks and stressed importance of PRN usage.  - Imaging:  Reviewed  - Labs: Reviewed.  Medications are appropriately dosed for current hepatorenal function.    Follow Up: virtually or we will call patient for MBB results.     HERIBERTO JessicaC  Interventional Pain Management      Disclaimer: This note was partly generated using dictation software which may occasionally result in transcription errors.

## 2022-06-22 NOTE — H&P (VIEW-ONLY)
[facility]  Established Clinic Visit     Chief Complaint:   Chief Complaint   Patient presents with    Low-back Pain         History of Present Illness: Anna Oakley is a 51 y.o. female here for low back pain and right leg pain. She presents with a history of low back pain for about 10 years without history of trauma or inciting event. She pain is located in lower lumbar region and raditating pain to right thigh/leg; anterolateral thigh and back of right calf. Her pain has been slowly progressive and has worsened in the past 2 years. She states her pain is most exacerbated when extending spine from a flexed position.     Onset: years  Location: low back  Radiation: right leg  Timing: intermittent  Quality: Aching, Burning, Tingling, Numb, Sharp and Shooting  Exacerbating Factors: sitting  Alleviating Factors: medications  Associated Symptoms: denies urinary incontinence, bowel incontinence, significant weight loss, significant motor weakness and loss of sensations    Severity: Currently: 8/10   Typical Range: 4-10/10     Exacerbation: 10/10     Interval History (06/22/2022):  Anna Oakley returns today for follow up.  At the last clinic visit,  She was s/f a Lumbar ILESI targeting L5-S1 that  provided 70% relief with improved functionaliy especially in her legs, however she is still complaning of axial low back pain that  Presents in a bandlike distribution.   Currently, the LBP continues leg pain is much better following Lumbar COLBY      No significant interval events or traumas. No change in bowel or bladder function, & no new weakness or numbness is reported. No new musculoskeletal pain complaints.    Current Pain Scales:  Current: 0/10              Typical Range: 0-0/10      Previous Interventions:  -05/25/2022  L5-S1 Interlaminar Epidural Steroid Injection under   - 04/20/2022-Bilateral L5  0% relief    Previous Therapies:  PT/OT: no   Relevant Surgery: no   Previous Medications:    - NSAIDS:   - Muscle Relaxants: Flexeril  - TCAs:  - SNRIs:   - Topicals:   - Anticonvulsants:  - Opioids: Hydrocodone    Current Pain Medications:  1. Naproxen p.r.n.    Blood Thinners: None    Full Medication List:    Current Outpatient Medications:     cyanocobalamin 500 MCG tablet, Take 500 mcg by mouth once daily., Disp: , Rfl:     levothyroxine (SYNTHROID) 88 MCG tablet, Take 1 tablet (88 mcg total) by mouth once daily., Disp: 90 tablet, Rfl: 3    losartan (COZAAR) 100 MG tablet, Take 1 tablet (100 mg total) by mouth once daily., Disp: 90 tablet, Rfl: 3    medroxyPROGESTERone (DEPO-PROVERA) 150 mg/mL Syrg, INJECT 1ML INTRAMUSCULARLY EVERY 90 DAYS., Disp: , Rfl:     multivitamin with minerals tablet, Take 1 tablet by mouth once daily., Disp: , Rfl:     naproxen (NAPROSYN) 500 MG tablet, Take 1 tablet (500 mg total) by mouth 2 (two) times daily., Disp: 60 tablet, Rfl: 1     Review of Systems:  Review of Systems   Constitutional: Negative for chills, fever, malaise/fatigue and weight loss.   HENT: Negative for ear pain, nosebleeds and sore throat.    Eyes: Negative for blurred vision, pain and redness.   Respiratory: Negative for cough, shortness of breath and wheezing.    Cardiovascular: Negative for chest pain, palpitations and leg swelling.   Gastrointestinal: Negative for abdominal pain, constipation, diarrhea, heartburn, nausea and vomiting.   Genitourinary: Negative for dysuria, flank pain, frequency and urgency.   Musculoskeletal: Positive for back pain. Negative for falls, joint pain, myalgias and neck pain.   Skin: Negative for itching and rash.   Neurological: Positive for tingling. Negative for dizziness, focal weakness, seizures, weakness and headaches.   Endo/Heme/Allergies: Does not bruise/bleed easily.   Psychiatric/Behavioral: Negative for depression and memory loss. The patient is not nervous/anxious and does not have insomnia.        Allergies:  Patient has no known allergies.      Medical History:   has a past medical history of Hypertension.    Surgical History:   has a past surgical history that includes Tubal ligation; Ablation; Transforaminal epidural injection of steroid (Right, 4/20/2022); and Epidural steroid injection into lumbar spine (N/A, 5/25/2022).  2010 fusion of cervical spine    Family History:  family history includes Breast cancer in her mother. Mother had a cervical fusion in 1995    Social History:   reports that she has never smoked. She does not have any smokeless tobacco history on file. She reports previous alcohol use. She reports that she does not use drugs.    Physical Exam:  /74   Pulse 78   Wt 82.6 kg (182 lb 1.6 oz)   BMI 29.39 kg/m²   GEN: No acute distress. Calm, comfortable  HENT: Normocephalic, atraumatic, moist mucous membranes  EYE: Anicteric sclera, non-injected.   CV: Non-diaphoretic.   RESP: Breathing comfortably. Chest expansion symmetric.  EXT: No clubbing, cyanosis.   SKIN: Warm, & dry to palpation. No visible rashes or lesions of exposed skin.   PSYCH: Pleasant mood and appropriate affect. Recent and remote memory intact.   GAIT: Independent ambulation  Lumbar Spine Exam:       Inspection: No erythema, bruising. No surgical incisions      Palpation: (+) TTP of sacroiliac joint on right.       ROM: Not Limited in flexion, extension, lateral bending      (+) Facet loading bilaterally      (+) Straight Leg Raise bilaterally on right      (+) Lasegue sign bilaterally on right      (-) OH bilaterally      (+) SIJ Compression Test on right  Hip Exam:      Inspection: No gross deformity or apparent leg length discrepancy      Palpation: +TTP of right gtb.       ROM:  full internal rotation, external rotation      (-) FADIR bilaterally  Neurologic Exam:     Alert. Speech is fluent and appropriate.     Strength: 5/5 throughout bilateral lower extremities     Sensation: Grossly intact to light touch in bilateral lower extremities     Reflexes:  2+ in b/l patella, achilles     Tone: No abnormality appreciated in bilateral lower extremities     No Clonus     Downgoing toes on plantar stimulation     (+) Hogue bilaterally                Imaging:  - X-ray lumbar spine 10/14/21:  The alignment of the lumbar spine is normal.  The vertebral body heights are well maintained.  Disc space narrowing L5-S1.  No fracture, no osseous lesions.  The sacroiliac joints appear symmetrical on the AP view.  The soft tissues appear normal.    Labs:  BMP  Lab Results   Component Value Date     11/22/2021    K 4.8 11/22/2021     11/22/2021    CO2 23 11/22/2021    BUN 13 11/22/2021    CREATININE 0.74 11/22/2021    CALCIUM 9.4 11/22/2021    ANIONGAP 11 11/22/2021    ESTGFRAFRICA >60.0 11/22/2021    EGFRNONAA >60.0 11/22/2021     Lab Results   Component Value Date    ALT 20 11/22/2021    AST 26 11/22/2021    ALKPHOS 65 11/22/2021    BILITOT 0.7 11/22/2021     Lab Results   Component Value Date     11/22/2021       Assessment:  Anna Oakley is a 51 y.o. female with the following diagnoses based on history, exam, and imaging:    Problem List Items Addressed This Visit    None     Visit Diagnoses     Spondylosis    -  Primary    Arthropathy of facet joints at multiple levels        Chronic low back pain without sciatica, unspecified back pain laterality        DDD (degenerative disc disease), lumbar              This is a pleasant 51 y.o. lady presenting with:     - Chronic low back pain with radiating symptoms into RLE. No weakness on exam. History and exam support her primary pain generator to be her right SI joint. She also has component of right GTB and possible lumbar radiculopathy.  - Comorbidities: HypoTH.     4/7/2022- 50 y/o with Hx of chronic low back, bilateral hip with radiating symptoms in her right leg. Upon Hx  And exam today I do believe she may have some right SI joint involvement. He primary issue could be NF or CS. I will order an Lumbar  MRI today to rule out pathology. No weakness upon exam, + Patricks more on the right.     05/11/[id number]-fpjv-old female with a history of chronic low back pain with radicular symptoms into her anterior thigh that sometimes radiates into her lower extremities.  She is status post a bilateral TF COLBY targeting L5-S1 that provided 0% relief.  Upon history and exam I have ruled out that her pain could possibly be bilateral SI joint inflammation, recommended that we attempt a lumbar interlaminar COLBY targeting L5-S1.  Her lumbar MRI shows at L5-S1 moderate to severe degenerative disc disease with high-grade disc space narrowing.  Type 1 endplate marrow signal changes.  She has no significant central canal or neural foraminal stenosis at this level.    6/22/2022- 50 y/o female with a hx of CLBP with right radicular symptoms presents today following a Lumbar COLBY targeting L5-S1. She reported improved rihgt leg pain, but her low back pain continues.  Her MRI is significant for multilevel facet arthritis beginning at L2 to S1. Her pain continues in the area of L4/5 and L5/S1 across her low back.     Treatment Plan:   - PT/OT/HEP: Continue PT.  Discussed benefits of exercise for   pain.   - Procedures:  S/f a diagnostic Lumbar MBB targeting L4/5 and L5/S1 x2 and RFA if appropriate.   - Medications:  Continue Naproxen 500mg BID PRN if helping if not discontinue. Educated patient about potential GI, cardiovascular, and renal risks and stressed importance of PRN usage.  - Imaging:  Reviewed  - Labs: Reviewed.  Medications are appropriately dosed for current hepatorenal function.    Follow Up: virtually or we will call patient for MBB results.     HERIBERTO JessicaC  Interventional Pain Management      Disclaimer: This note was partly generated using dictation software which may occasionally result in transcription errors.

## 2022-06-23 ENCOUNTER — TELEPHONE (OUTPATIENT)
Dept: PAIN MEDICINE | Facility: CLINIC | Age: 52
End: 2022-06-23
Payer: COMMERCIAL

## 2022-06-23 DIAGNOSIS — M47.816 LUMBAR SPONDYLOSIS: Primary | ICD-10-CM

## 2022-06-27 ENCOUNTER — PATIENT OUTREACH (OUTPATIENT)
Dept: ADMINISTRATIVE | Facility: HOSPITAL | Age: 52
End: 2022-06-27
Payer: COMMERCIAL

## 2022-07-05 ENCOUNTER — TELEPHONE (OUTPATIENT)
Dept: PAIN MEDICINE | Facility: CLINIC | Age: 52
End: 2022-07-05
Payer: COMMERCIAL

## 2022-07-12 ENCOUNTER — TELEPHONE (OUTPATIENT)
Dept: PAIN MEDICINE | Facility: CLINIC | Age: 52
End: 2022-07-12
Payer: COMMERCIAL

## 2022-07-12 NOTE — DISCHARGE INSTRUCTIONS
Home Care Instructions Pain Management:    1.  DIET:    You may resume your normal diet today.    2.  BATHING:    You may shower with luke warm water.    3.  DRESSING:    You may remove your bandage today.    4.  ACTIVITY LEVEL:      You may resume your normal activities 24 hours after your procedure.    5.  MEDICATIONS:    You may resume your normal medications today.    6.  SPECIAL INSTRUCTIONS:    No heat to the injection site for 24 hours including bath or shower, heating pad, moist heat or hot tubs.    Use an ice pack to the injection site for any pain or discomfort.  Apply ice packs for 20 minute intervals as needed.    If you have received any sedatives by mouth today, you can not drive for 12 hours.    If you have received sedation through an IV, you can not drive for 24 hours.    PLEASE CALL YOUR DOCTOR FOR THE FOLLOWIN.  Redness or swelling around the injection site.  2.  Fever of 101 degrees.  3.  Drainage (pus) from the injection site.  4.  For any continuous bleeding (some dried blood over the incision is normal.)    FOR EMERGENCIES:    If any unusual problems or difficulties occur during clinic hours, call [phone] or dial [phone].    Follow up with with your physician in 2-3 weeks.

## 2022-07-13 ENCOUNTER — HOSPITAL ENCOUNTER (OUTPATIENT)
Facility: HOSPITAL | Age: 52
Discharge: HOME | End: 2022-07-13
Attending: PHYSICAL MEDICINE & REHABILITATION | Admitting: PHYSICAL MEDICINE & REHABILITATION
Payer: COMMERCIAL

## 2022-07-13 VITALS
RESPIRATION RATE: 15 BRPM | WEIGHT: 180 LBS | HEIGHT: 66 IN | BODY MASS INDEX: 28.93 KG/M2 | OXYGEN SATURATION: 100 % | SYSTOLIC BLOOD PRESSURE: 154 MMHG | HEART RATE: 79 BPM | TEMPERATURE: 98 F | DIASTOLIC BLOOD PRESSURE: 72 MMHG

## 2022-07-13 DIAGNOSIS — M47.816 LUMBAR SPONDYLOSIS: Primary | ICD-10-CM

## 2022-07-13 DIAGNOSIS — R52 PAIN: ICD-10-CM

## 2022-07-13 PROCEDURE — 64494 INJ PARAVERT F JNT L/S 2 LEV: CPT | Mod: 50 | Performed by: PHYSICAL MEDICINE & REHABILITATION

## 2022-07-13 PROCEDURE — 64493 INJ PARAVERT F JNT L/S 1 LEV: CPT | Mod: 50,KX,, | Performed by: PHYSICAL MEDICINE & REHABILITATION

## 2022-07-13 PROCEDURE — 25000003 PHARM REV CODE 250: Performed by: PHYSICAL MEDICINE & REHABILITATION

## 2022-07-13 PROCEDURE — 64493 INJ PARAVERT F JNT L/S 1 LEV: CPT | Mod: 50 | Performed by: PHYSICAL MEDICINE & REHABILITATION

## 2022-07-13 PROCEDURE — 64494 INJ PARAVERT F JNT L/S 2 LEV: CPT | Mod: 50,KX,, | Performed by: PHYSICAL MEDICINE & REHABILITATION

## 2022-07-13 PROCEDURE — 64494 PR INJ DX/THER AGNT PARAVERT FACET JOINT,IMG GUIDE,LUMBAR/SAC, 2ND LEVEL: ICD-10-PCS | Mod: 50,KX,, | Performed by: PHYSICAL MEDICINE & REHABILITATION

## 2022-07-13 PROCEDURE — 64493 PR INJ DX/THER AGNT PARAVERT FACET JOINT,IMG GUIDE,LUMBAR/SAC,1ST LVL: ICD-10-PCS | Mod: 50,KX,, | Performed by: PHYSICAL MEDICINE & REHABILITATION

## 2022-07-13 RX ORDER — BUPIVACAINE HYDROCHLORIDE 5 MG/ML
INJECTION, SOLUTION EPIDURAL; INTRACAUDAL
Status: DISCONTINUED | OUTPATIENT
Start: 2022-07-13 | End: 2022-07-13 | Stop reason: HOSPADM

## 2022-07-13 RX ORDER — LIDOCAINE HYDROCHLORIDE 20 MG/ML
INJECTION, SOLUTION EPIDURAL; INFILTRATION; INTRACAUDAL; PERINEURAL
Status: DISCONTINUED | OUTPATIENT
Start: 2022-07-13 | End: 2022-07-13 | Stop reason: HOSPADM

## 2022-07-13 NOTE — DISCHARGE SUMMARY
[facility]  Discharge Note  Short Stay     Admit Date: 7/13/2022    Discharge Date: 7/13/2022     Attending Physician: Janee Johnson M.D.    Diagnoses:  Active Hospital Problems    Diagnosis  POA    *Lumbar spondylosis [M47.816]  Yes      Resolved Hospital Problems   No resolved problems to display.     Discharged Condition: Good     Hospital Course: Patient was admitted for an outpatient interventional pain management procedure and tolerated the procedure well with no complications.     Final Diagnoses: Same as principal problem.     Disposition: Home or Self Care     Follow up/Patient Instructions:   Follow-up in 1-2 weeks unless otherwise instructed. May return sooner as needed.       Reconciled Medications:     Medication List      CONTINUE taking these medications    cyanocobalamin 500 MCG tablet  Take 500 mcg by mouth once daily.     levothyroxine 88 MCG tablet  Commonly known as: SYNTHROID  Take 1 tablet (88 mcg total) by mouth once daily.     losartan 100 MG tablet  Commonly known as: COZAAR  Take 1 tablet (100 mg total) by mouth once daily.     medroxyPROGESTERone 150 mg/mL Syrg  Commonly known as: DEPO-PROVERA  INJECT 1ML INTRAMUSCULARLY EVERY 90 DAYS.     multivitamin with minerals tablet  Take 1 tablet by mouth once daily.     naproxen 500 MG tablet  Commonly known as: NAPROSYN  Take 1 tablet (500 mg total) by mouth 2 (two) times daily.           Discharge Procedure Orders (must include Diet, Follow-up, Activity)   Ice to affected area   Order Comments: 20 minutes of ice or until area numb to the touch if area is sore 2-3 times per day as needed     No driving until:   Order Comments: Until following day     No dressing needed     Notify your health care provider if you experience any of the following:  temperature >100.4     Notify your health care provider if you experience any of the following:  persistent nausea and vomiting or diarrhea     Notify your health care provider if you  experience any of the following:  severe uncontrolled pain     Notify your health care provider if you experience any of the following:  redness, tenderness, or signs of infection (pain, swelling, redness, odor or green/yellow discharge around incision site)     Notify your health care provider if you experience any of the following:  difficulty breathing or increased cough     Notify your health care provider if you experience any of the following:  severe persistent headache     Notify your health care provider if you experience any of the following:  worsening rash     Notify your health care provider if you experience any of the following:  persistent dizziness, light-headedness, or visual disturbances     Notify your health care provider if you experience any of the following:  increased confusion or weakness     Shower on day dressing removed (No bath)       Janee Johnson M.D.  Interventional Pain Medicine / Physical Medicine & Rehabilitation

## 2022-07-13 NOTE — OP NOTE
Lumbar Medial Branch Block Under Fluoroscopic Guidance:  I have reviewed the patient's medications, allergies and relevant histories prior to the procedure and no contraindications have been identified. The risks, benefits and alternatives to the procedure were discussed with the patient, including bleeding, infection, discomfort, nerve injury, weakness, numbness or bowel and bladder dysfunction. All questions regarding the procedure were answered to the patient's satisfaction. I personally obtained Anna's consent prior to the start of the procedure and the signed consent can be found in the patient's chart.                                                         Time-out was taken to identify patient, procedure, laterality, and allergies prior to starting the procedure.       Date of Service: 07/13/2022  Procedure: Bilateral L4-5, L5-S1 zygapophyseal facet joint diagnostic medial branch block  Pre-Operative Diagnosis: Lumbar Spondylosis  Post-Operative Diagnosis: Lumbar Spondylosis    Physician: Janee Johnson M.D.  Assistants: Rishi Armstrong M.D.      Medications Injected: Bupivacaine 0.5% (0.5 mL at each level)  Local Anesthetic: Xylocaine 1% 10 mL.   Sedation Medications: None    Procedural Technique:   Laying in a prone position, the patient was prepped and draped in the usual sterile fashion using ChloraPrep and fenestrated drape.  Continuous hemodynamic monitoring was initiated including blood pressure, EKG, and pulse oximetry. The vertebral level was determined under fluoroscopic guidance.  Local anesthetic was given by going down to the hub of the 25-gauge 1.5 inch needle and raising a wheel.  A 25-gauge 3.5 inch needle was introduced to the anatomic location of the Bilateral medial branch of L3, L4, and L5 at the junction of the superior articular process and transverse process of L4, L5 & sacral ala respectively utilizing live fluoroscopy to block the above stated lumbar facet joints. After negative aspiration  for blood, medication was then injected slowly. The needle was removed and bandage applied to the area.    Estimated Blood Loss:  None.  Complications:  None.     Disposition: The patient tolerated the procedure well. Vital signs remained stable throughout the procedure. The patient was taken to the recovery area and monitored. The patient was supplied with written discharge instructions for the procedure. If helpful, we can repeat as needed. If only short term relief, may proceed with radiofrequency ablation. The patient was discharged in a stable condition.    Follow-Up: We will see the patient back in 1-2 weeks or the patient may call to inform of status.

## 2022-07-13 NOTE — PLAN OF CARE
Patient refused wheelchair upon discharge. Patient was able to exhibit stability and steadiness on feet.

## 2022-07-14 ENCOUNTER — PATIENT MESSAGE (OUTPATIENT)
Dept: OBSTETRICS AND GYNECOLOGY | Facility: CLINIC | Age: 52
End: 2022-07-14
Payer: COMMERCIAL

## 2022-07-14 ENCOUNTER — TELEPHONE (OUTPATIENT)
Dept: PAIN MEDICINE | Facility: CLINIC | Age: 52
End: 2022-07-14
Payer: COMMERCIAL

## 2022-07-14 ENCOUNTER — TELEPHONE (OUTPATIENT)
Dept: PAIN MEDICINE | Facility: HOSPITAL | Age: 52
End: 2022-07-14
Payer: COMMERCIAL

## 2022-07-14 RX ORDER — MEDROXYPROGESTERONE ACETATE 150 MG/ML
INJECTION, SUSPENSION INTRAMUSCULAR
Qty: 1 ML | Refills: 3 | Status: SHIPPED | OUTPATIENT
Start: 2022-07-14 | End: 2023-07-03 | Stop reason: SDUPTHER

## 2022-07-14 NOTE — TELEPHONE ENCOUNTER
"----- Message from Aby Maurer RN sent at 7/14/2022  9:57 AM CDT -----  Regarding: Follow-up phone call to patient regarding MBB from 7-13  Procedure: MBB  Procedure Date: 7/13/22    The patient was contacted via telephone after the Medial Branch Block to assess post-operative pain scores. The following was reported:    Did the patient experience 80% relief or more: NO, the patient did not experience any pain relief    Did the patient experience functional improvement with walking, standing. Exercise, or activities of daily living: NO, the patient did not experience any functional improvement in any ADLs.    The patient requested that I let you know that the MBB did not help her. She states a prior steroid injection helped. She asked me to relay that her pain "is not nerves" it is "disc rubbing together".    "

## 2022-07-14 NOTE — TELEPHONE ENCOUNTER
Procedure: MBB  Procedure Date: 7/13/22    The patient was contacted via telephone after the Medial Branch Block to assess post-operative pain scores. The following was reported:    Did the patient experience 80% relief or more: NO, the patient did not experience any pain relief    Did the patient experience functional improvement with walking, standing. Exercise, or activities of daily living: NO, the patient did not experience any functional improvement in any ADLs.

## 2022-07-15 ENCOUNTER — TELEPHONE (OUTPATIENT)
Dept: PAIN MEDICINE | Facility: CLINIC | Age: 52
End: 2022-07-15
Payer: COMMERCIAL

## 2022-07-15 DIAGNOSIS — M47.816 LUMBAR SPONDYLOSIS: Primary | ICD-10-CM

## 2022-07-15 NOTE — TELEPHONE ENCOUNTER
"----- Message from Janee Johnson MD sent at 7/14/2022 10:49 AM CDT -----  Regarding: FW: Follow-up phone call to patient regarding MBB from 7-13  Please schedule patient for caudal COLBY with 2 week f/u with Norman Brown.    Thanks,  KP  ----- Message -----  From: Aby Maurer RN  Sent: 7/14/2022   9:59 AM CDT  To: Janee Johnson MD  Subject: Follow-up phone call to patient regarding MB#    Procedure: MBB  Procedure Date: 7/13/22    The patient was contacted via telephone after the Medial Branch Block to assess post-operative pain scores. The following was reported:    Did the patient experience 80% relief or more: NO, the patient did not experience any pain relief    Did the patient experience functional improvement with walking, standing. Exercise, or activities of daily living: NO, the patient did not experience any functional improvement in any ADLs.    The patient requested that I let you know that the MBB did not help her. She states a prior steroid injection helped. She asked me to relay that her pain "is not nerves" it is "disc rubbing together".      "

## 2022-07-15 NOTE — TELEPHONE ENCOUNTER
Caudal COLBY  scheduled 07/20/2022 in [place] with Dr. Johnson at 2:20 pm. Patient notified of arrival time of 1:20 pm. Patient has had Covid vaccinations. Patient declines having any implanted electrical devices (bladder stimulator, pacemaker, defibrillator, glucose monitor, etc). Advised patient to contact office if she starts any type of antibiotics or new blood thinners. Voiced understanding.

## 2022-07-19 ENCOUNTER — TELEPHONE (OUTPATIENT)
Dept: PAIN MEDICINE | Facility: CLINIC | Age: 52
End: 2022-07-19
Payer: COMMERCIAL

## 2022-07-19 NOTE — DISCHARGE INSTRUCTIONS
Home Care Instructions Pain Management:    1.  DIET:    You may resume your normal diet today.    2.  BATHING:    You may shower with luke warm water.    3.  DRESSING:    You may remove your bandage today.    4.  ACTIVITY LEVEL:      You may resume your normal activities 24 hours after your procedure.    5.  MEDICATIONS:    You may resume your normal medications today.    6.  SPECIAL INSTRUCTIONS:    No heat to the injection site for 24 hours including bath or shower, heating pad, moist heat or hot tubs.    Use an ice pack to the injection site for any pain or discomfort.  Apply ice packs for 20 minute intervals as needed.    If you have received any sedatives by mouth today, you can not drive for 12 hours.    If you have received sedation through an IV, you can not drive for 24 hours.    PLEASE CALL YOUR DOCTOR FOR THE FOLLOWIN.  Redness or swelling around the injection site.  2.  Fever of 101 degrees.  3.  Drainage (pus) from the injection site.  4.  For any continuous bleeding (some dried blood over the incision is normal.)    FOR EMERGENCIES:    If any unusual problems or difficulties occur during clinic hours, call [phone] or dial [phone].    Follow up with with your physician in 2-3 weeks.

## 2022-07-20 ENCOUNTER — HOSPITAL ENCOUNTER (OUTPATIENT)
Facility: HOSPITAL | Age: 52
Discharge: HOME | End: 2022-07-20
Attending: PHYSICAL MEDICINE & REHABILITATION | Admitting: PHYSICAL MEDICINE & REHABILITATION
Payer: COMMERCIAL

## 2022-07-20 VITALS
HEART RATE: 70 BPM | OXYGEN SATURATION: 100 % | DIASTOLIC BLOOD PRESSURE: 79 MMHG | HEIGHT: 66 IN | WEIGHT: 182 LBS | BODY MASS INDEX: 29.25 KG/M2 | SYSTOLIC BLOOD PRESSURE: 138 MMHG | TEMPERATURE: 97 F | RESPIRATION RATE: 16 BRPM

## 2022-07-20 DIAGNOSIS — M54.17 LUMBOSACRAL RADICULOPATHY: Primary | ICD-10-CM

## 2022-07-20 DIAGNOSIS — R52 PAIN: ICD-10-CM

## 2022-07-20 PROCEDURE — 62323 NJX INTERLAMINAR LMBR/SAC: CPT | Performed by: PHYSICAL MEDICINE & REHABILITATION

## 2022-07-20 PROCEDURE — 62323 NJX INTERLAMINAR LMBR/SAC: CPT | Mod: ,,, | Performed by: PHYSICAL MEDICINE & REHABILITATION

## 2022-07-20 PROCEDURE — 25000003 PHARM REV CODE 250: Performed by: PHYSICAL MEDICINE & REHABILITATION

## 2022-07-20 PROCEDURE — 63600175 PHARM REV CODE 636 W HCPCS: Performed by: PHYSICAL MEDICINE & REHABILITATION

## 2022-07-20 PROCEDURE — 25500020 PHARM REV CODE 255: Performed by: PHYSICAL MEDICINE & REHABILITATION

## 2022-07-20 PROCEDURE — 62323 PR INJ LUMBAR/SACRAL, W/IMAGING GUIDANCE: ICD-10-PCS | Mod: ,,, | Performed by: PHYSICAL MEDICINE & REHABILITATION

## 2022-07-20 RX ORDER — SODIUM CHLORIDE 9 MG/ML
INJECTION, SOLUTION INTRAMUSCULAR; INTRAVENOUS; SUBCUTANEOUS
Status: DISCONTINUED | OUTPATIENT
Start: 2022-07-20 | End: 2022-07-20 | Stop reason: HOSPADM

## 2022-07-20 RX ORDER — LIDOCAINE HYDROCHLORIDE 10 MG/ML
INJECTION, SOLUTION EPIDURAL; INFILTRATION; INTRACAUDAL; PERINEURAL
Status: DISCONTINUED | OUTPATIENT
Start: 2022-07-20 | End: 2022-07-20 | Stop reason: HOSPADM

## 2022-07-20 RX ORDER — LIDOCAINE HYDROCHLORIDE 10 MG/ML
INJECTION INFILTRATION; PERINEURAL
Status: DISCONTINUED | OUTPATIENT
Start: 2022-07-20 | End: 2022-07-20 | Stop reason: HOSPADM

## 2022-07-20 RX ORDER — DEXAMETHASONE SODIUM PHOSPHATE 4 MG/ML
INJECTION, SOLUTION INTRA-ARTICULAR; INTRALESIONAL; INTRAMUSCULAR; INTRAVENOUS; SOFT TISSUE
Status: DISCONTINUED | OUTPATIENT
Start: 2022-07-20 | End: 2022-07-20 | Stop reason: HOSPADM

## 2022-07-20 NOTE — OP NOTE
Caudal Epidural Steroid Injection Under Fluoroscopic Guidance:  I have reviewed the patient's medications, allergies and relevant histories prior to the procedure and no contraindications have been identified. The risks, benefits and alternatives to the procedure were discussed with the patient, and all questions regarding the procedure were answered to the patient's satisfaction. I personally obtained Anna's consent prior to the start of the procedure and the signed consent can be found in the patient's chart.                                                         Time-out was taken to identify patient, procedure, laterality, and allergies prior to starting the procedure.       Date of Service: 07/20/2022  Procedure: Caudal epidural steroid injection under fluoroscopy with insertion of flexible catheter  Pre-Operative Diagnosis: Lumbar Radiculopathy  Post-Operative Diagnosis: Lumbar Radiculopathy    Physician: Janee Johnson M.D.  Assistants: Rishi Armstrong M.D.      Medications Injected: Preservative-free dexamethasone 10mg/mL, and preservative free Lidocaine 1% MPF 5mL.  Local Anesthetic: Xylocaine 1% 10 mL.   Sedation Medications: None.     Procedural Technique: Laying in the prone position, the patient was prepped and draped in the usual sterile fashion using ChloraPrep and fenestrated drape.  Appropriate anatomic landmarks were determined including the superior LS-spine and sacral hiatus.  Local anesthetic was given by raising a wheel and going down to the periosteum.  A 3.5in 16-gauge spinal needle was introduced thru the sacral hiatus.  The flexible catheter threaded through to the desired levels. Omnipaque was injected after negative aspiration to confirm placement in the appropriate area and that there was no vascular runoff.  The medication was then injected slowly.  Needle was removed and bandage applied.     Estimated Blood Loss:  None.  Complications:  None.     Disposition: The patient tolerated the  procedure well, and there were no apparent complications. Vital signs remained stable throughout the procedure. The patient was taken to the recovery area and monitored after the procedure. The patient was given written discharge instructions for the procedure. If helpful, we can repeat as needed. The patient was discharged in a stable condition.    Follow-Up: We will see the patient back in 1-2 weeks or the patient may call to inform of status.

## 2022-07-20 NOTE — DISCHARGE SUMMARY
[facility]  Discharge Note  Short Stay     Admit Date: 7/20/2022    Discharge Date: 7/20/2022     Attending Physician: Janee Johnson M.D.    Diagnoses:  Active Hospital Problems    Diagnosis  POA    *Lumbar radiculopathy [M54.16]  Yes      Resolved Hospital Problems   No resolved problems to display.     Discharged Condition: Good     Hospital Course: Patient was admitted for an outpatient interventional pain management procedure and tolerated the procedure well with no complications.     Final Diagnoses: Same as principal problem.     Disposition: Home or Self Care     Follow up/Patient Instructions:   Follow-up in 1-2 weeks unless otherwise instructed. May return sooner as needed.       Reconciled Medications:     Medication List      CONTINUE taking these medications    cyanocobalamin 500 MCG tablet  Take 500 mcg by mouth once daily.     levothyroxine 88 MCG tablet  Commonly known as: SYNTHROID  Take 1 tablet (88 mcg total) by mouth once daily.     losartan 100 MG tablet  Commonly known as: COZAAR  Take 1 tablet (100 mg total) by mouth once daily.     medroxyPROGESTERone 150 mg/mL Syrg  Commonly known as: DEPO-PROVERA  INJECT 1ML INTRAMUSCULARLY EVERY 90 DAYS.     multivitamin with minerals tablet  Take 1 tablet by mouth once daily.     naproxen 500 MG tablet  Commonly known as: NAPROSYN  Take 1 tablet (500 mg total) by mouth 2 (two) times daily.          No discharge procedures on file.    Janee Johnson M.D.  Interventional Pain Medicine / Physical Medicine & Rehabilitation

## 2022-07-27 ENCOUNTER — TELEPHONE (OUTPATIENT)
Dept: PAIN MEDICINE | Facility: CLINIC | Age: 52
End: 2022-07-27
Payer: COMMERCIAL

## 2022-07-27 DIAGNOSIS — M54.17 LUMBOSACRAL RADICULOPATHY: Primary | ICD-10-CM

## 2022-07-27 RX ORDER — CYCLOBENZAPRINE HCL 5 MG
5-10 TABLET ORAL 3 TIMES DAILY PRN
Qty: 180 TABLET | Refills: 0 | Status: SHIPPED | OUTPATIENT
Start: 2022-07-27 | End: 2022-08-26

## 2022-07-27 NOTE — TELEPHONE ENCOUNTER
----- Message from Camila Pederson sent at 7/27/2022 10:17 AM CDT -----  Type:  Needs Medical Advice    Who Called: pt  Symptoms (please be specific): Pt wants to get medication sent to the pharmacy  Pharmacy name and phone #:  [facility]  [address] [place] [address] Call [phone]      Would the patient rather a call back or a response via [facility]? call  Best Call Back Number: [phone]  Additional Information: n/a

## 2022-07-27 NOTE — TELEPHONE ENCOUNTER
Spoke with patient. States that Flexeril was discussed when she had her procedure. Unable to pend, due to not in current med list. Please advise.

## 2022-07-29 ENCOUNTER — PATIENT MESSAGE (OUTPATIENT)
Dept: PAIN MEDICINE | Facility: CLINIC | Age: 52
End: 2022-07-29
Payer: COMMERCIAL

## 2022-08-09 ENCOUNTER — TELEPHONE (OUTPATIENT)
Dept: PAIN MEDICINE | Facility: CLINIC | Age: 52
End: 2022-08-09
Payer: COMMERCIAL

## 2022-08-09 NOTE — TELEPHONE ENCOUNTER
----- Message from Patricia Camacho sent at 8/9/2022  2:33 PM CDT -----  Type:  Missed Appointment       Name of Caller: Pt   When is the first available appointment? N/a   Symptoms: 2w f/u  Best Call Back Number: [phone]  Additional Information:   pt calling to find out why she was not called to say her appointment was cancelled.. Pt never signed into the virtual appt.  said she was never told she had to sign into the rodolfo for appt

## 2022-08-10 ENCOUNTER — OFFICE VISIT (OUTPATIENT)
Dept: PAIN MEDICINE | Facility: CLINIC | Age: 52
End: 2022-08-10
Payer: COMMERCIAL

## 2022-08-10 DIAGNOSIS — M47.816 LUMBAR SPONDYLOSIS: ICD-10-CM

## 2022-08-10 DIAGNOSIS — M46.1 SACROILIITIS: ICD-10-CM

## 2022-08-10 DIAGNOSIS — M54.16 LUMBAR RADICULOPATHY, CHRONIC: ICD-10-CM

## 2022-08-10 DIAGNOSIS — M54.17 LUMBOSACRAL RADICULOPATHY: Primary | ICD-10-CM

## 2022-08-10 DIAGNOSIS — M54.50 CHRONIC LOW BACK PAIN WITHOUT SCIATICA, UNSPECIFIED BACK PAIN LATERALITY: ICD-10-CM

## 2022-08-10 DIAGNOSIS — M47.9 SPONDYLOSIS: ICD-10-CM

## 2022-08-10 DIAGNOSIS — G89.29 CHRONIC LOW BACK PAIN WITHOUT SCIATICA, UNSPECIFIED BACK PAIN LATERALITY: ICD-10-CM

## 2022-08-10 DIAGNOSIS — M47.819 ARTHROPATHY OF FACET JOINTS AT MULTIPLE LEVELS: ICD-10-CM

## 2022-08-10 DIAGNOSIS — G89.29 CHRONIC RIGHT-SIDED LOW BACK PAIN WITH RIGHT-SIDED SCIATICA: ICD-10-CM

## 2022-08-10 DIAGNOSIS — M51.36 DDD (DEGENERATIVE DISC DISEASE), LUMBAR: ICD-10-CM

## 2022-08-10 DIAGNOSIS — M53.3 SACROILIAC JOINT DYSFUNCTION: ICD-10-CM

## 2022-08-10 DIAGNOSIS — M54.16 LUMBAR RADICULOPATHY: ICD-10-CM

## 2022-08-10 DIAGNOSIS — M54.41 CHRONIC RIGHT-SIDED LOW BACK PAIN WITH RIGHT-SIDED SCIATICA: ICD-10-CM

## 2022-08-10 PROCEDURE — 1160F PR REVIEW ALL MEDS BY PRESCRIBER/CLIN PHARMACIST DOCUMENTED: ICD-10-PCS | Mod: CPTII,95,, | Performed by: NURSE PRACTITIONER

## 2022-08-10 PROCEDURE — 1160F RVW MEDS BY RX/DR IN RCRD: CPT | Mod: CPTII,95,, | Performed by: NURSE PRACTITIONER

## 2022-08-10 PROCEDURE — 99214 OFFICE O/P EST MOD 30 MIN: CPT | Mod: 95,,, | Performed by: NURSE PRACTITIONER

## 2022-08-10 PROCEDURE — 4010F PR ACE/ARB THEARPY RXD/TAKEN: ICD-10-PCS | Mod: CPTII,95,, | Performed by: NURSE PRACTITIONER

## 2022-08-10 PROCEDURE — 1159F PR MEDICATION LIST DOCUMENTED IN MEDICAL RECORD: ICD-10-PCS | Mod: CPTII,95,, | Performed by: NURSE PRACTITIONER

## 2022-08-10 PROCEDURE — 99214 PR OFFICE/OUTPT VISIT, EST, LEVL IV, 30-39 MIN: ICD-10-PCS | Mod: 95,,, | Performed by: NURSE PRACTITIONER

## 2022-08-10 PROCEDURE — 1159F MED LIST DOCD IN RCRD: CPT | Mod: CPTII,95,, | Performed by: NURSE PRACTITIONER

## 2022-08-10 PROCEDURE — 4010F ACE/ARB THERAPY RXD/TAKEN: CPT | Mod: CPTII,95,, | Performed by: NURSE PRACTITIONER

## 2022-08-10 NOTE — PROGRESS NOTES
[facility]  Established Clinic Visit   telemedicine Encounter    Telemedicine Bundle:  This visit was completed during the Coronavirus Crisis to enhance patient safety.  The patient location is: patient's home  The chief complaint leading to consultation is: Low-back Pain (Right side ) and Hip Pain (Right )  Visit type: Virtual visit with synchronous audio and video  Total time spent with patient: 15 minutes   Each patient to whom he or she provides medical services by telemedicine is:    (1) informed of the relationship between the physician and patient and the respective role of any other health care provider with respect to management of the patient  (2) notified that he or she may decline to receive medical services by telemedicine and may withdraw from such care at any time.      Chief Complaint:   Chief Complaint   Patient presents with    Low-back Pain     Right side     Hip Pain     Right          History of Present Illness: Anna Oakley is a 52 y.o. female here for low back pain and right leg pain. She presents with a history of low back pain for about 10 years without history of trauma or inciting event. She pain is located in lower lumbar region and raditating pain to right thigh/leg; anterolateral thigh and back of right calf. Her pain has been slowly progressive and has worsened in the past 2 years. She states her pain is most exacerbated when extending spine from a flexed position.     Onset: years  Location: low back  Radiation: right leg  Timing: intermittent  Quality: Aching, Burning, Tingling, Numb, Sharp and Shooting  Exacerbating Factors: sitting  Alleviating Factors: medications  Associated Symptoms: denies urinary incontinence, bowel incontinence, significant weight loss, significant motor weakness and loss of sensations    Severity: Currently: 8/10   Typical Range: 4-10/10     Exacerbation: 10/10     Interval History  8/10/2022- Ms Oakley presents virtually, she is s/p a  Caudal COLBY on 7/20/22 she is reporting 100% relief of her left sided pain however her right side has started to hurt. Her right sided pian is in the right LB that radiates into her right hip.      06/22/2022):  Anna Oakley returns today for follow up.  At the last clinic visit,  She was s/f a Lumbar ILESI targeting L5-S1 that  provided 70% relief with improved functionaliy especially in her legs, however she is still complaning of axial low back pain that  Presents in a bandlike distribution.   Currently, the LBP continues leg pain is much better following Lumbar COLBY      No significant interval events or traumas. No change in bowel or bladder function, & no new weakness or numbness is reported. No new musculoskeletal pain complaints.    Current Pain Scales:  Current: 0/10              Typical Range: 0-0/10      Previous Interventions:  -07/20/2022 Caudal epidural steroid injection 100% improvement of her left-sided pain  -05/25/2022  L5-S1 Interlaminar Epidural Steroid Injection under   - 04/20/2022-Bilateral L5  0% relief    Previous Therapies:  PT/OT: no   Relevant Surgery: no   Previous Medications:   - NSAIDS:   - Muscle Relaxants: Flexeril  - TCAs:  - SNRIs:   - Topicals:   - Anticonvulsants:  - Opioids: Hydrocodone    Current Pain Medications:  1. Naproxen p.r.n.    Blood Thinners: None    Full Medication List:    Current Outpatient Medications:     cyanocobalamin 500 MCG tablet, Take 500 mcg by mouth once daily., Disp: , Rfl:     cyclobenzaprine (FLEXERIL) 5 MG tablet, Take 1-2 tablets (5-10 mg total) by mouth 3 (three) times daily as needed for Muscle spasms., Disp: 180 tablet, Rfl: 0    levothyroxine (SYNTHROID) 88 MCG tablet, Take 1 tablet (88 mcg total) by mouth once daily., Disp: 90 tablet, Rfl: 3    losartan (COZAAR) 100 MG tablet, Take 1 tablet (100 mg total) by mouth once daily., Disp: 90 tablet, Rfl: 3    medroxyPROGESTERone (DEPO-PROVERA) 150 mg/mL Syrg, INJECT 1ML INTRAMUSCULARLY  EVERY 90 DAYS., Disp: 1 mL, Rfl: 3    multivitamin with minerals tablet, Take 1 tablet by mouth once daily., Disp: , Rfl:     naproxen (NAPROSYN) 500 MG tablet, Take 1 tablet (500 mg total) by mouth 2 (two) times daily., Disp: 60 tablet, Rfl: 1     Review of Systems:  Review of Systems   Constitutional: Negative for chills, fever, malaise/fatigue and weight loss.   HENT: Negative for ear pain, nosebleeds and sore throat.    Eyes: Negative for blurred vision, pain and redness.   Respiratory: Negative for cough, shortness of breath and wheezing.    Cardiovascular: Negative for chest pain, palpitations and leg swelling.   Gastrointestinal: Negative for abdominal pain, constipation, diarrhea, heartburn, nausea and vomiting.   Genitourinary: Negative for dysuria, flank pain, frequency and urgency.   Musculoskeletal: Positive for back pain. Negative for falls, joint pain, myalgias and neck pain.   Skin: Negative for itching and rash.   Neurological: Positive for tingling. Negative for dizziness, focal weakness, seizures, weakness and headaches.   Endo/Heme/Allergies: Does not bruise/bleed easily.   Psychiatric/Behavioral: Negative for depression and memory loss. The patient is not nervous/anxious and does not have insomnia.        Allergies:  Patient has no known allergies.     Medical History:   has a past medical history of Hypertension.    Surgical History:   has a past surgical history that includes Tubal ligation; Ablation; Transforaminal epidural injection of steroid (Right, 4/20/2022); Epidural steroid injection into lumbar spine (N/A, 5/25/2022); Injection of anesthetic agent around medial branch nerves innervating lumbar facet joint (Bilateral, 7/13/2022); and Caudal epidural steroid injection (N/A, 7/20/2022).  2010 fusion of cervical spine    Family History:  family history includes Breast cancer in her mother. Mother had a cervical fusion in 1995    Social History:   reports that she has never smoked. She  does not have any smokeless tobacco history on file. She reports previous alcohol use. She reports that she does not use drugs.    Physical Exam:  There were no vitals taken for this visit.  There was no PE done for this virtual visit    Imaging:  - X-ray lumbar spine 10/14/21:  The alignment of the lumbar spine is normal.  The vertebral body heights are well maintained.  Disc space narrowing L5-S1.  No fracture, no osseous lesions.  The sacroiliac joints appear symmetrical on the AP view.  The soft tissues appear normal.    Labs:  BMP  Lab Results   Component Value Date     11/22/2021    K 4.8 11/22/2021     11/22/2021    CO2 23 11/22/2021    BUN 13 11/22/2021    CREATININE 0.74 11/22/2021    CALCIUM 9.4 11/22/2021    ANIONGAP 11 11/22/2021    ESTGFRAFRICA >60.0 11/22/2021    EGFRNONAA >60.0 11/22/2021     Lab Results   Component Value Date    ALT 20 11/22/2021    AST 26 11/22/2021    ALKPHOS 65 11/22/2021    BILITOT 0.7 11/22/2021     Lab Results   Component Value Date     11/22/2021       Assessment:  Anna Oakley is a 52 y.o. female with the following diagnoses based on history, exam, and imaging:    Problem List Items Addressed This Visit        Neuro    Lumbar radiculopathy    Lumbar spondylosis       Orthopedic    Sacroiliac joint dysfunction    Chronic right-sided low back pain with right-sided sciatica      Other Visit Diagnoses     Lumbosacral radiculopathy    -  Primary    Spondylosis        Arthropathy of facet joints at multiple levels        Chronic low back pain without sciatica, unspecified back pain laterality        DDD (degenerative disc disease), lumbar        Lumbar radiculopathy, chronic        Sacroiliitis              This is a pleasant 52 y.o. lady presenting with:     - Chronic low back pain with radiating symptoms into RLE. No weakness on exam. History and exam support her primary pain generator to be her right SI joint. She also has component of right GTB and  possible lumbar radiculopathy.  - Comorbidities: HypoTH.     4/7/2022- 52 y/o with Hx of chronic low back, bilateral hip with radiating symptoms in her right leg. Upon Hx  And exam today I do believe she may have some right SI joint involvement. He primary issue could be NF or CS. I will order an Lumbar MRI today to rule out pathology. No weakness upon exam, + Patricks more on the right.     05/11/[id number]-zbvw-old female with a history of chronic low back pain with radicular symptoms into her anterior thigh that sometimes radiates into her lower extremities.  She is status post a bilateral TF COLBY targeting L5-S1 that provided 0% relief.  Upon history and exam I have ruled out that her pain could possibly be bilateral SI joint inflammation, recommended that we attempt a lumbar interlaminar COLBY targeting L5-S1.  Her lumbar MRI shows at L5-S1 moderate to severe degenerative disc disease with high-grade disc space narrowing.  Type 1 endplate marrow signal changes.  She has no significant central canal or neural foraminal stenosis at this level.    6/22/2022- 52 y/o female with a hx of CLBP with right radicular symptoms presents today following a Lumbar COLBY targeting L5-S1. She reported improved rihgt leg pain, but her low back pain continues.  Her MRI is significant for multilevel facet arthritis beginning at L2 to S1. Her pain continues in the area of L4/5 and L5/S1 across her low back.     08/10/6998-26-ycde-old female with history of chronic low back pain with radicular symptoms into her anterior thighs.  She is status post SI that improved her left-sided low back pain, however she is now having right-sided low back pain that radiates into her right hip.  Upon review of her recent injection it appears that Dr. Johnson may have but more the medicine the left-sided she was complaining about this during her clinic visit with him.  She is now having mainly right-sided low back pain I discussed with patient I will consult  Dr. Johnson to see if he would like to schedule her for additional pain intervention to help with right-sided low back pain.  Patient verbalized understanding and agreed.    Treatment Plan:   - PT/OT/HEP: Continue PT.  Discussed benefits of exercise for   pain.   - Procedures:  Consider repeating Caudal COLBY with a right paramedial approach to help with now right sided LBP.                             Consider Right SI joint inj   - Medications:  Continue Naproxen 500mg BID PRN if helping if not discontinue. Educated patient about potential GI, cardiovascular, and renal risks and stressed importance of PRN usage.                Continue Flexeril 5-10 daily PRN   - Imaging:  Reviewed  - Labs: Reviewed.  Medications are appropriately dosed for current hepatorenal function.    Follow Up: virtually or in clinic     HERIBERTO JessicaC  Interventional Pain Management      Disclaimer: This note was partly generated using dictation software which may occasionally result in transcription errors.

## 2022-08-17 ENCOUNTER — OFFICE VISIT (OUTPATIENT)
Dept: OBSTETRICS AND GYNECOLOGY | Facility: CLINIC | Age: 52
End: 2022-08-17
Payer: COMMERCIAL

## 2022-08-17 VITALS
SYSTOLIC BLOOD PRESSURE: 142 MMHG | DIASTOLIC BLOOD PRESSURE: 86 MMHG | BODY MASS INDEX: 29.83 KG/M2 | HEIGHT: 66 IN | WEIGHT: 185.63 LBS

## 2022-08-17 DIAGNOSIS — Z13.820 SCREENING FOR OSTEOPOROSIS: ICD-10-CM

## 2022-08-17 DIAGNOSIS — Z12.31 ENCOUNTER FOR SCREENING MAMMOGRAM FOR BREAST CANCER: ICD-10-CM

## 2022-08-17 DIAGNOSIS — Z11.51 SCREENING FOR HPV (HUMAN PAPILLOMAVIRUS): ICD-10-CM

## 2022-08-17 DIAGNOSIS — Z30.9 ENCOUNTER FOR CONTRACEPTIVE MANAGEMENT, UNSPECIFIED TYPE: ICD-10-CM

## 2022-08-17 DIAGNOSIS — Z12.4 ENCOUNTER FOR PAPANICOLAOU SMEAR FOR CERVICAL CANCER SCREENING: ICD-10-CM

## 2022-08-17 DIAGNOSIS — Z12.11 COLON CANCER SCREENING: ICD-10-CM

## 2022-08-17 DIAGNOSIS — Z01.419 WOMEN'S ANNUAL ROUTINE GYNECOLOGICAL EXAMINATION: Primary | ICD-10-CM

## 2022-08-17 PROCEDURE — 3079F PR MOST RECENT DIASTOLIC BLOOD PRESSURE 80-89 MM HG: ICD-10-PCS | Mod: CPTII,S$GLB,, | Performed by: NURSE PRACTITIONER

## 2022-08-17 PROCEDURE — 99396 PR PREVENTIVE VISIT,EST,40-64: ICD-10-PCS | Mod: S$GLB,,, | Performed by: NURSE PRACTITIONER

## 2022-08-17 PROCEDURE — 3079F DIAST BP 80-89 MM HG: CPT | Mod: CPTII,S$GLB,, | Performed by: NURSE PRACTITIONER

## 2022-08-17 PROCEDURE — 1159F MED LIST DOCD IN RCRD: CPT | Mod: CPTII,S$GLB,, | Performed by: NURSE PRACTITIONER

## 2022-08-17 PROCEDURE — 99999 PR PBB SHADOW E&M-EST. PATIENT-LVL III: ICD-10-PCS | Mod: PBBFAC,,, | Performed by: NURSE PRACTITIONER

## 2022-08-17 PROCEDURE — 1159F PR MEDICATION LIST DOCUMENTED IN MEDICAL RECORD: ICD-10-PCS | Mod: CPTII,S$GLB,, | Performed by: NURSE PRACTITIONER

## 2022-08-17 PROCEDURE — 4010F PR ACE/ARB THEARPY RXD/TAKEN: ICD-10-PCS | Mod: CPTII,S$GLB,, | Performed by: NURSE PRACTITIONER

## 2022-08-17 PROCEDURE — 3077F PR MOST RECENT SYSTOLIC BLOOD PRESSURE >= 140 MM HG: ICD-10-PCS | Mod: CPTII,S$GLB,, | Performed by: NURSE PRACTITIONER

## 2022-08-17 PROCEDURE — 4010F ACE/ARB THERAPY RXD/TAKEN: CPT | Mod: CPTII,S$GLB,, | Performed by: NURSE PRACTITIONER

## 2022-08-17 PROCEDURE — 3008F BODY MASS INDEX DOCD: CPT | Mod: CPTII,S$GLB,, | Performed by: NURSE PRACTITIONER

## 2022-08-17 PROCEDURE — 88141 PR  CYTOPATH CERV/VAG INTERPRET: ICD-10-PCS | Mod: ,,, | Performed by: PATHOLOGY

## 2022-08-17 PROCEDURE — 87624 HPV HI-RISK TYP POOLED RSLT: CPT | Performed by: NURSE PRACTITIONER

## 2022-08-17 PROCEDURE — 99999 PR PBB SHADOW E&M-EST. PATIENT-LVL III: CPT | Mod: PBBFAC,,, | Performed by: NURSE PRACTITIONER

## 2022-08-17 PROCEDURE — 88142 CYTOPATH C/V THIN LAYER: CPT | Performed by: PATHOLOGY

## 2022-08-17 PROCEDURE — 3008F PR BODY MASS INDEX (BMI) DOCUMENTED: ICD-10-PCS | Mod: CPTII,S$GLB,, | Performed by: NURSE PRACTITIONER

## 2022-08-17 PROCEDURE — 88141 CYTOPATH C/V INTERPRET: CPT | Mod: ,,, | Performed by: PATHOLOGY

## 2022-08-17 PROCEDURE — 99396 PREV VISIT EST AGE 40-64: CPT | Mod: S$GLB,,, | Performed by: NURSE PRACTITIONER

## 2022-08-17 PROCEDURE — 3077F SYST BP >= 140 MM HG: CPT | Mod: CPTII,S$GLB,, | Performed by: NURSE PRACTITIONER

## 2022-08-17 RX ORDER — MEDROXYPROGESTERONE ACETATE 150 MG/ML
150 INJECTION, SUSPENSION INTRAMUSCULAR
Qty: 1 ML | Refills: 3 | Status: SHIPPED | OUTPATIENT
Start: 2022-08-17 | End: 2022-08-21

## 2022-08-17 NOTE — PROGRESS NOTES
CC: Annual  HPI: Pt is a 52 y.o.  female who presents for routine annual exam. She is still woring for [employer]. She and spouse enjoy traveling. She uses Depo for contraception. She does not want STD screening.  Denies any GYN complaints.  The patient participates in regular exercise: no- has had some back pain with disk involvement.  The patient does not smoke.  The patient wears seatbelts.   Pt denies any domestic violence. MMG is UTD- due in 423.  She is in need of a colonoscopy.      FH:  Breast cancer: mother (remission)   Colon cancer: none  Ovarian cancer: none  Endometrial cancer: none    ROS:  GENERAL: Feeling well overall. Denies fever or chills.   SKIN: Denies rash or lesions.   HEAD: Denies head injury or headache.   NODES: Denies enlarged lymph nodes.   CHEST: Denies chest pain or shortness of breath.   CARDIOVASCULAR: Denies palpitations or left sided chest pain.   ABDOMEN: No abdominal pain, constipation, diarrhea, nausea, vomiting or rectal bleeding.   URINARY: No dysuria, hematuria, or burning on urination.  REPRODUCTIVE: See HPI.   BREASTS: Denies pain, lumps, or nipple discharge.   HEMATOLOGIC: No easy bruisability or excessive bleeding.   MUSCULOSKELETAL: Denies joint pain or swelling.   NEUROLOGIC: Denies syncope or weakness.   PSYCHIATRIC: Denies depression, anxiety or mood swings.    PE:   APPEARANCE: Well nourished, well developed, Black or  female in no acute distress.  NODES: no cervical, supraclavicular, or inguinal lymphadenopathy  BREASTS: Symmetrical, no skin changes or visible lesions. No palpable masses, nipple discharge or adenopathy bilaterally.  ABDOMEN: Soft. No tenderness or masses. No distention. No hernias palpated. No CVA tenderness.  VULVA: No lesions. Normal external female genitalia.  URETHRAL MEATUS: Normal size and location, no lesions, no prolapse.  URETHRA: No masses, tenderness, or prolapse.  VAGINA: Moist. No lesions or lacerations noted.  No abnormal discharge present. No odor present.   CERVIX: No lesions or discharge. No cervical motion tenderness.   UTERUS: Normal size, regular shape, mobile, non-tender.  ADNEXA: No tenderness. No fullness or masses palpated in the adnexal regions.   ANUS PERINEUM: Normal.      Diagnosis:  1. Women's annual routine gynecological examination    2. Encounter for Papanicolaou smear for cervical cancer screening    3. Screening for HPV (human papillomavirus)    4. Encounter for screening mammogram for breast cancer    5. Screening for osteoporosis    6. Encounter for contraceptive management, unspecified type    7. Colon cancer screening        Plan:   Pap/ HPV  MMG in 4/23  Referral for colonoscopy   DEXA for eval due to prolonged depo use  Depo Refilled   Recommend a calcium supplement of a total of 1200 mg daily along with vitamin D of at least 800 IU daily to support bone health.     Orders Placed This Encounter    HPV High Risk Genotypes, PCR    Mammo Digital Screening Bilat w/ Thomas    DXA Bone Density Spine And Hip    Liquid-Based Pap Smear, Screening    medroxyPROGESTERone (DEPO-PROVERA) 150 mg/mL Syrg    Case Request Endoscopy: COLONOSCOPY       Patient was counseled today on the new ACS guidelines for cervical cytology screening as well as the current recommendations for breast cancer screening. She was counseled to follow up with her PCP for other routine health maintenance. Counseling session lasted approximately 10 minutes, and all her questions were answered.    Follow-up with me in 1 year for routine exam    RENÉE Tucker

## 2022-08-22 ENCOUNTER — HOSPITAL ENCOUNTER (OUTPATIENT)
Dept: RADIOLOGY | Facility: HOSPITAL | Age: 52
Discharge: HOME | End: 2022-08-22
Attending: NURSE PRACTITIONER
Payer: COMMERCIAL

## 2022-08-22 ENCOUNTER — PATIENT OUTREACH (OUTPATIENT)
Dept: ADMINISTRATIVE | Facility: HOSPITAL | Age: 52
End: 2022-08-22
Payer: COMMERCIAL

## 2022-08-22 DIAGNOSIS — Z13.820 SCREENING FOR OSTEOPOROSIS: ICD-10-CM

## 2022-08-22 LAB
HPV HR 12 DNA SPEC QL NAA+PROBE: NEGATIVE
HPV16 AG SPEC QL: NEGATIVE
HPV18 DNA SPEC QL NAA+PROBE: NEGATIVE

## 2022-08-22 PROCEDURE — 77080 DXA BONE DENSITY AXIAL: CPT | Mod: TC,PO

## 2022-08-24 LAB
FINAL PATHOLOGIC DIAGNOSIS: ABNORMAL
Lab: ABNORMAL

## 2022-08-25 ENCOUNTER — PATIENT MESSAGE (OUTPATIENT)
Dept: OBSTETRICS AND GYNECOLOGY | Facility: CLINIC | Age: 52
End: 2022-08-25
Payer: COMMERCIAL

## 2022-08-26 ENCOUNTER — PATIENT MESSAGE (OUTPATIENT)
Dept: PAIN MEDICINE | Facility: CLINIC | Age: 52
End: 2022-08-26
Payer: COMMERCIAL

## 2022-08-26 DIAGNOSIS — M54.41 CHRONIC RIGHT-SIDED LOW BACK PAIN WITH RIGHT-SIDED SCIATICA: ICD-10-CM

## 2022-08-26 DIAGNOSIS — G89.29 CHRONIC RIGHT-SIDED LOW BACK PAIN WITH RIGHT-SIDED SCIATICA: ICD-10-CM

## 2022-08-26 DIAGNOSIS — M54.16 LUMBAR RADICULOPATHY: ICD-10-CM

## 2022-08-26 DIAGNOSIS — M53.3 SACROILIAC JOINT DYSFUNCTION: ICD-10-CM

## 2022-08-26 RX ORDER — NAPROXEN 500 MG/1
500 TABLET ORAL 2 TIMES DAILY
Qty: 60 TABLET | Refills: 1 | Status: SHIPPED | OUTPATIENT
Start: 2022-08-26 | End: 2023-11-20 | Stop reason: SDUPTHER

## 2022-08-29 DIAGNOSIS — Z12.11 SCREENING FOR COLON CANCER: Primary | ICD-10-CM

## 2022-09-04 DIAGNOSIS — I10 ESSENTIAL HYPERTENSION: ICD-10-CM

## 2022-09-06 RX ORDER — HYDROCHLOROTHIAZIDE 12.5 MG/1
TABLET ORAL
Qty: 90 TABLET | Refills: 1 | Status: SHIPPED | OUTPATIENT
Start: 2022-09-06 | End: 2023-01-11

## 2022-10-24 ENCOUNTER — CLINICAL SUPPORT (OUTPATIENT)
Dept: ENDOSCOPY | Facility: HOSPITAL | Age: 52
End: 2022-10-24
Payer: COMMERCIAL

## 2022-10-24 VITALS — BODY MASS INDEX: 28.93 KG/M2 | HEIGHT: 66 IN | WEIGHT: 180 LBS

## 2022-10-24 DIAGNOSIS — Z12.11 SCREENING FOR COLON CANCER: ICD-10-CM

## 2022-10-24 RX ORDER — POLYETHYLENE GLYCOL 3350, SODIUM SULFATE ANHYDROUS, SODIUM BICARBONATE, SODIUM CHLORIDE, POTASSIUM CHLORIDE 236; 22.74; 6.74; 5.86; 2.97 G/4L; G/4L; G/4L; G/4L; G/4L
4 POWDER, FOR SOLUTION ORAL ONCE
Qty: 4000 ML | Refills: 0 | Status: SHIPPED | OUTPATIENT
Start: 2022-10-24 | End: 2022-10-24

## 2022-11-02 ENCOUNTER — ANESTHESIA (OUTPATIENT)
Dept: ENDOSCOPY | Facility: HOSPITAL | Age: 52
End: 2022-11-02
Payer: COMMERCIAL

## 2022-11-02 ENCOUNTER — ANESTHESIA EVENT (OUTPATIENT)
Dept: ENDOSCOPY | Facility: HOSPITAL | Age: 52
End: 2022-11-02
Payer: COMMERCIAL

## 2022-11-02 ENCOUNTER — HOSPITAL ENCOUNTER (OUTPATIENT)
Facility: HOSPITAL | Age: 52
Discharge: HOME | End: 2022-11-02
Attending: INTERNAL MEDICINE | Admitting: INTERNAL MEDICINE
Payer: COMMERCIAL

## 2022-11-02 VITALS
RESPIRATION RATE: 16 BRPM | BODY MASS INDEX: 29.25 KG/M2 | DIASTOLIC BLOOD PRESSURE: 71 MMHG | WEIGHT: 182 LBS | HEIGHT: 66 IN | HEART RATE: 69 BPM | TEMPERATURE: 98 F | OXYGEN SATURATION: 100 % | SYSTOLIC BLOOD PRESSURE: 139 MMHG

## 2022-11-02 DIAGNOSIS — Z12.11 SCREENING FOR COLON CANCER: ICD-10-CM

## 2022-11-02 DIAGNOSIS — Z12.11 COLON CANCER SCREENING: Primary | ICD-10-CM

## 2022-11-02 LAB
B-HCG UR QL: NEGATIVE
CTP QC/QA: YES

## 2022-11-02 PROCEDURE — G0121 COLON CA SCRN NOT HI RSK IND: HCPCS | Mod: ,,, | Performed by: INTERNAL MEDICINE

## 2022-11-02 PROCEDURE — 25000003 PHARM REV CODE 250: Performed by: INTERNAL MEDICINE

## 2022-11-02 PROCEDURE — E9220 PRA ENDO ANESTHESIA: HCPCS | Mod: ,,, | Performed by: NURSE ANESTHETIST, CERTIFIED REGISTERED

## 2022-11-02 PROCEDURE — 81025 URINE PREGNANCY TEST: CPT | Performed by: INTERNAL MEDICINE

## 2022-11-02 PROCEDURE — G0121 COLON CA SCRN NOT HI RSK IND: HCPCS | Performed by: INTERNAL MEDICINE

## 2022-11-02 PROCEDURE — 37000009 HC ANESTHESIA EA ADD 15 MINS: Performed by: INTERNAL MEDICINE

## 2022-11-02 PROCEDURE — G0121 COLON CA SCRN NOT HI RSK IND: ICD-10-PCS | Mod: ,,, | Performed by: INTERNAL MEDICINE

## 2022-11-02 PROCEDURE — 63600175 PHARM REV CODE 636 W HCPCS: Performed by: NURSE ANESTHETIST, CERTIFIED REGISTERED

## 2022-11-02 PROCEDURE — E9220 PRA ENDO ANESTHESIA: ICD-10-PCS | Mod: ,,, | Performed by: NURSE ANESTHETIST, CERTIFIED REGISTERED

## 2022-11-02 PROCEDURE — 25000003 PHARM REV CODE 250: Performed by: NURSE ANESTHETIST, CERTIFIED REGISTERED

## 2022-11-02 PROCEDURE — 37000008 HC ANESTHESIA 1ST 15 MINUTES: Performed by: INTERNAL MEDICINE

## 2022-11-02 RX ORDER — PROPOFOL 10 MG/ML
VIAL (ML) INTRAVENOUS
Status: DISCONTINUED | OUTPATIENT
Start: 2022-11-02 | End: 2022-11-02

## 2022-11-02 RX ORDER — SODIUM CHLORIDE 9 MG/ML
INJECTION, SOLUTION INTRAVENOUS CONTINUOUS
Status: DISCONTINUED | OUTPATIENT
Start: 2022-11-02 | End: 2022-11-02 | Stop reason: HOSPADM

## 2022-11-02 RX ORDER — PROPOFOL 10 MG/ML
VIAL (ML) INTRAVENOUS CONTINUOUS PRN
Status: DISCONTINUED | OUTPATIENT
Start: 2022-11-02 | End: 2022-11-02

## 2022-11-02 RX ORDER — LIDOCAINE HYDROCHLORIDE 20 MG/ML
INJECTION INTRAVENOUS
Status: DISCONTINUED | OUTPATIENT
Start: 2022-11-02 | End: 2022-11-02

## 2022-11-02 RX ADMIN — PROPOFOL 150 MCG/KG/MIN: 10 INJECTION, EMULSION INTRAVENOUS at 01:11

## 2022-11-02 RX ADMIN — PROPOFOL 30 MG: 10 INJECTION, EMULSION INTRAVENOUS at 01:11

## 2022-11-02 RX ADMIN — LIDOCAINE HYDROCHLORIDE 50 MG: 20 INJECTION INTRAVENOUS at 01:11

## 2022-11-02 RX ADMIN — PROPOFOL 70 MG: 10 INJECTION, EMULSION INTRAVENOUS at 01:11

## 2022-11-02 RX ADMIN — SODIUM CHLORIDE: 0.9 INJECTION, SOLUTION INTRAVENOUS at 01:11

## 2022-11-02 NOTE — TRANSFER OF CARE
"Anesthesia Transfer of Care Note    Patient: Anna Oakley    Procedure(s) Performed: Procedure(s) (LRB):  COLONOSCOPY (N/A)    Patient location: GI    Anesthesia Type: general    Transport from OR: Transported from OR on room air with adequate spontaneous ventilation    Post pain: adequate analgesia    Post assessment: no apparent anesthetic complications and tolerated procedure well    Post vital signs: stable    Level of consciousness: awake, alert and oriented    Nausea/Vomiting: no nausea/vomiting    Complications: none    Transfer of care protocol was followed      Last vitals:   Visit Vitals  /60   Pulse 79   Temp 36.3 °C (97.3 °F)   Resp 16   Ht 5' 6" (1.676 m)   Wt 82.6 kg (182 lb)   SpO2 100%   Breastfeeding No   BMI 29.38 kg/m²     "

## 2022-11-02 NOTE — H&P
Short Stay Endoscopy History and Physical    PCP - Bharat Mcpherson MD    Procedure - Colonoscopy  Sedation: GA  ASA - per anesthesia  Mallampati - per anesthesia  History of Anesthesia problems - no  Family history Anesthesia problems -  no     HPI:  This is a 52 y.o. female here for evaluation of : Screening for CRC    Reflux - no  Dysphagia - no  Abdominal pain - no  Diarrhea - no    ROS:  Constitutional: No fevers, chills, No weight loss  ENT: No allergies  CV: No chest pain  Pulm: No cough, No shortness of breath  Ophtho: No vision changes  GI: see HPI  Medical History:  has a past medical history of Hypertension.    Surgical History:  has a past surgical history that includes Tubal ligation; Ablation; Transforaminal epidural injection of steroid (Right, 4/20/2022); Epidural steroid injection into lumbar spine (N/A, 5/25/2022); Injection of anesthetic agent around medial branch nerves innervating lumbar facet joint (Bilateral, 7/13/2022); and Caudal epidural steroid injection (N/A, 7/20/2022).    Family History: family history includes Breast cancer in her mother.. Otherwise no colon cancer, inflammatory bowel disease, or GI malignancies.    Social History:  reports that she has never smoked. She does not have any smokeless tobacco history on file. She reports that she does not currently use alcohol. She reports that she does not use drugs.    Review of patient's allergies indicates:  No Known Allergies    Medications:   Medications Prior to Admission   Medication Sig Dispense Refill Last Dose    cyclobenzaprine (FLEXERIL) 5 MG tablet Take 1-2 tablets (5-10 mg total) by mouth 3 (three) times daily as needed for Muscle spasms. 180 tablet 0     levothyroxine (SYNTHROID) 88 MCG tablet Take 1 tablet (88 mcg total) by mouth once daily. 90 tablet 3     losartan (COZAAR) 100 MG tablet Take 1 tablet (100 mg total) by mouth once daily. 90 tablet 3     medroxyPROGESTERone (DEPO-PROVERA) 150 mg/mL Syrg INJECT 1ML  INTRAMUSCULARLY EVERY 90 DAYS. 1 mL 3     naproxen (NAPROSYN) 500 MG tablet Take 1 tablet (500 mg total) by mouth 2 (two) times daily. 60 tablet 1        Objective Findings:    Vital Signs: Per nursing notes.    Physical Exam:  General Appearance: Well appearing in no acute distress  Head:   Normocephalic, without obvious abnormality  Eyes:    No scleral icterus  Airway: Open  Neck: No restriction in mobility  Lungs: CTA bilaterally in anterior and posterior fields, no wheezes, no crackles.  Heart:  Regular rate and rhythm, S1, S2 normal, no murmurs heard  Abdomen: Soft, non tender, non distended      Labs:  Lab Results   Component Value Date    WBC 5.32 11/22/2021    HGB 13.3 11/22/2021    HCT 40.3 11/22/2021     11/22/2021    CHOL 165 12/07/2020    TRIG 63 12/07/2020    HDL 44 12/07/2020    ALT 20 11/22/2021    AST 26 11/22/2021     11/22/2021    K 4.8 11/22/2021     11/22/2021    CREATININE 0.74 11/22/2021    BUN 13 11/22/2021    CO2 23 11/22/2021    TSH 0.762 11/22/2021         I have explained the risks and benefits of endoscopy procedures to the patient including but not limited to bleeding, perforation, infection, and death.    Thank you so much for allowing me to participate in the care of Anna Woods MD

## 2022-11-02 NOTE — ANESTHESIA POSTPROCEDURE EVALUATION
Anesthesia Post Evaluation    Patient: Anna Oakley    Procedure(s) Performed: Procedure(s) (LRB):  COLONOSCOPY (N/A)    Final Anesthesia Type: general      Patient location during evaluation: GI PACU  Patient participation: Yes- Able to Participate  Level of consciousness: awake and alert  Post-procedure vital signs: reviewed and stable  Pain management: adequate  Airway patency: patent    PONV status at discharge: No PONV  Anesthetic complications: no      Cardiovascular status: stable  Respiratory status: unassisted and spontaneous ventilation  Hydration status: euvolemic  Follow-up not needed.          Vitals Value Taken Time   /71 11/02/22 1435   Temp 36.6 °C (97.9 °F) 11/02/22 1409   Pulse 69 11/02/22 1435   Resp 16 11/02/22 1435   SpO2 100 % 11/02/22 1435         Event Time   Out of Recovery 14:35:35         Pain/Josiah Score: Josiah Score: 9 (11/2/2022  2:09 PM)

## 2022-11-02 NOTE — PROVATION PATIENT INSTRUCTIONS
Discharge Summary/Instructions after an Endoscopic Procedure  Patient Name: Anna Oakley  Patient MRN: [id number]  Patient YOB: 1970 Wednesday, November 2, 2022  Endy Woods MD  Dear patient,  As a result of recent federal legislation (The Federal Cures Act), you may   receive lab or pathology results from your procedure in your [facility]   account before your physician is able to contact you. Your physician or   their representative will relay the results to you with their   recommendations at their soonest availability.  Thank you,  RESTRICTIONS:  During your procedure today, you received medications for sedation.  These   medications may affect your judgment, balance and coordination.  Therefore,   for 24 hours, you have the following restrictions:   - DO NOT drive a car, operate machinery, make legal/financial decisions,   sign important papers or drink alcohol.    ACTIVITY:  Today: no heavy lifting, straining or running due to procedural   sedation/anesthesia.  The following day: return to full activity including work.  DIET:  Eat and drink normally unless instructed otherwise.     TREATMENT FOR COMMON SIDE EFFECTS:  - Mild abdominal pain, nausea, belching, bloating or excessive gas:  rest,   eat lightly and use a heating pad.  - Sore Throat: treat with throat lozenges and/or gargle with warm salt   water.  - Because air was used during the procedure, expelling large amounts of air   from your rectum or belching is normal.  - If a bowel prep was taken, you may not have a bowel movement for 1-3 days.    This is normal.  SYMPTOMS TO WATCH FOR AND REPORT TO YOUR PHYSICIAN:  1. Abdominal pain or bloating, other than gas cramps.  2. Chest pain.  3. Back pain.  4. Signs of infection such as: chills or fever occurring within 24 hours   after the procedure.  5. Rectal bleeding, which would show as bright red, maroon, or black stools.   (A tablespoon of blood from the rectum is not serious, especially  if   hemorrhoids are present.)  6. Vomiting.  7. Weakness or dizziness.  GO DIRECTLY TO THE NEAREST EMERGENCY ROOM IF YOU HAVE ANY OF THE FOLLOWING:      Difficulty breathing              Chills and/or fever over 101 F   Persistent vomiting and/or vomiting blood   Severe abdominal pain   Severe chest pain   Black, tarry stools   Bleeding- more than one tablespoon   Any other symptom or condition that you feel may need urgent attention  Your doctor recommends these additional instructions:  If any biopsies were taken, your doctors clinic will contact you in 1 to 2   weeks with any results.  - Patient has a contact number available for emergencies.  The signs and   symptoms of potential delayed complications were discussed with the   patient.  Return to normal activities tomorrow.  Written discharge   instructions were provided to the patient.   - Discharge patient to home.   - Resume previous diet.   - Continue present medications.   - Repeat colonoscopy in 10 years for screening purposes.   For questions, problems or results please call your physician - Endy Woods MD at Work:  [phone].  [facility], EMERGENCY ROOM PHONE NUMBER: [phone]  IF A COMPLICATION OR EMERGENCY SITUATION ARISES AND YOU ARE UNABLE TO REACH   YOUR PHYSICIAN - GO DIRECTLY TO THE EMERGENCY ROOM.  Endy Woods MD  11/2/2022 2:07:14 PM  This report has been verified and signed electronically.  Dear patient,  As a result of recent federal legislation (The Federal Cures Act), you may   receive lab or pathology results from your procedure in your [facility]   account before your physician is able to contact you. Your physician or   their representative will relay the results to you with their   recommendations at their soonest availability.  Thank you,  PROVATION

## 2022-12-28 DIAGNOSIS — I10 ESSENTIAL HYPERTENSION: ICD-10-CM

## 2023-01-11 ENCOUNTER — TELEPHONE (OUTPATIENT)
Dept: CARDIOLOGY | Facility: CLINIC | Age: 53
End: 2023-01-11

## 2023-01-11 DIAGNOSIS — I10 ESSENTIAL HYPERTENSION: ICD-10-CM

## 2023-01-11 RX ORDER — AMLODIPINE BESYLATE 10 MG/1
10 TABLET ORAL DAILY
Qty: 90 TABLET | Refills: 1 | Status: SHIPPED | OUTPATIENT
Start: 2023-01-11 | End: 2023-01-11

## 2023-01-11 RX ORDER — HYDROCHLOROTHIAZIDE 12.5 MG/1
12.5 TABLET ORAL DAILY
Qty: 90 TABLET | Refills: 1 | Status: SHIPPED | OUTPATIENT
Start: 2023-01-11 | End: 2023-06-20

## 2023-01-11 RX ORDER — AMLODIPINE BESYLATE 10 MG/1
10 TABLET ORAL DAILY
Qty: 90 TABLET | Refills: 1 | Status: SHIPPED | OUTPATIENT
Start: 2023-01-11 | End: 2023-06-20

## 2023-01-11 RX ORDER — POTASSIUM CHLORIDE 1500 MG/1
20 TABLET, EXTENDED RELEASE ORAL DAILY
Qty: 90 TABLET | Refills: 0 | Status: SHIPPED | OUTPATIENT
Start: 2023-01-11 | End: 2023-06-20

## 2023-01-11 NOTE — TELEPHONE ENCOUNTER
[facility] Call Center    Phone Message    May a detailed message be left on voicemail: yes     Reason for Call: Medication Refill Request    Has the patient contacted the pharmacy for the refill? Yes   Name of medication being requested:     hydrochlorothiazide (HYDRODIURIL) 12.5 MG tablet     amLODIPine (NORVASC) 10 MG tablet    Provider who prescribed the medication: Dr. Ever Cooper  Pharmacy: [facility]Y #[id number] - [place], [place] - [address]  Date medication is needed: ASAP    Action Taken: Other: cardio    Travel Screening: Not Applicable     Thank you!  [facility]

## 2023-01-23 ENCOUNTER — OFFICE VISIT (OUTPATIENT)
Dept: FAMILY MEDICINE | Facility: CLINIC | Age: 53
End: 2023-01-23
Payer: COMMERCIAL

## 2023-01-23 VITALS
OXYGEN SATURATION: 96 % | HEIGHT: 66 IN | HEART RATE: 91 BPM | WEIGHT: 192.88 LBS | TEMPERATURE: 98 F | SYSTOLIC BLOOD PRESSURE: 124 MMHG | DIASTOLIC BLOOD PRESSURE: 72 MMHG | BODY MASS INDEX: 31 KG/M2

## 2023-01-23 DIAGNOSIS — Z23 NEED FOR SHINGLES VACCINE: ICD-10-CM

## 2023-01-23 DIAGNOSIS — I10 ESSENTIAL HYPERTENSION: Primary | ICD-10-CM

## 2023-01-23 DIAGNOSIS — Z23 NEED FOR INFLUENZA VACCINATION: ICD-10-CM

## 2023-01-23 DIAGNOSIS — Z00.00 WELLNESS EXAMINATION: ICD-10-CM

## 2023-01-23 DIAGNOSIS — Z13.6 SCREENING FOR CARDIOVASCULAR CONDITION: ICD-10-CM

## 2023-01-23 DIAGNOSIS — R73.9 HYPERGLYCEMIA: ICD-10-CM

## 2023-01-23 PROCEDURE — 90686 FLU VACCINE (QUAD) GREATER THAN OR EQUAL TO 3YO PRESERVATIVE FREE IM: ICD-10-PCS | Mod: S$GLB,,, | Performed by: STUDENT IN AN ORGANIZED HEALTH CARE EDUCATION/TRAINING PROGRAM

## 2023-01-23 PROCEDURE — 1160F RVW MEDS BY RX/DR IN RCRD: CPT | Mod: CPTII,S$GLB,, | Performed by: STUDENT IN AN ORGANIZED HEALTH CARE EDUCATION/TRAINING PROGRAM

## 2023-01-23 PROCEDURE — 90750 HZV VACC RECOMBINANT IM: CPT | Mod: S$GLB,,, | Performed by: STUDENT IN AN ORGANIZED HEALTH CARE EDUCATION/TRAINING PROGRAM

## 2023-01-23 PROCEDURE — 1159F PR MEDICATION LIST DOCUMENTED IN MEDICAL RECORD: ICD-10-PCS | Mod: CPTII,S$GLB,, | Performed by: STUDENT IN AN ORGANIZED HEALTH CARE EDUCATION/TRAINING PROGRAM

## 2023-01-23 PROCEDURE — 3008F PR BODY MASS INDEX (BMI) DOCUMENTED: ICD-10-PCS | Mod: CPTII,S$GLB,, | Performed by: STUDENT IN AN ORGANIZED HEALTH CARE EDUCATION/TRAINING PROGRAM

## 2023-01-23 PROCEDURE — 3078F DIAST BP <80 MM HG: CPT | Mod: CPTII,S$GLB,, | Performed by: STUDENT IN AN ORGANIZED HEALTH CARE EDUCATION/TRAINING PROGRAM

## 2023-01-23 PROCEDURE — 1159F MED LIST DOCD IN RCRD: CPT | Mod: CPTII,S$GLB,, | Performed by: STUDENT IN AN ORGANIZED HEALTH CARE EDUCATION/TRAINING PROGRAM

## 2023-01-23 PROCEDURE — 3074F SYST BP LT 130 MM HG: CPT | Mod: CPTII,S$GLB,, | Performed by: STUDENT IN AN ORGANIZED HEALTH CARE EDUCATION/TRAINING PROGRAM

## 2023-01-23 PROCEDURE — 3074F PR MOST RECENT SYSTOLIC BLOOD PRESSURE < 130 MM HG: ICD-10-PCS | Mod: CPTII,S$GLB,, | Performed by: STUDENT IN AN ORGANIZED HEALTH CARE EDUCATION/TRAINING PROGRAM

## 2023-01-23 PROCEDURE — 90750 ZOSTER RECOMBINANT VACCINE: ICD-10-PCS | Mod: S$GLB,,, | Performed by: STUDENT IN AN ORGANIZED HEALTH CARE EDUCATION/TRAINING PROGRAM

## 2023-01-23 PROCEDURE — 3008F BODY MASS INDEX DOCD: CPT | Mod: CPTII,S$GLB,, | Performed by: STUDENT IN AN ORGANIZED HEALTH CARE EDUCATION/TRAINING PROGRAM

## 2023-01-23 PROCEDURE — 99396 PR PREVENTIVE VISIT,EST,40-64: ICD-10-PCS | Mod: 25,S$GLB,, | Performed by: STUDENT IN AN ORGANIZED HEALTH CARE EDUCATION/TRAINING PROGRAM

## 2023-01-23 PROCEDURE — 3078F PR MOST RECENT DIASTOLIC BLOOD PRESSURE < 80 MM HG: ICD-10-PCS | Mod: CPTII,S$GLB,, | Performed by: STUDENT IN AN ORGANIZED HEALTH CARE EDUCATION/TRAINING PROGRAM

## 2023-01-23 PROCEDURE — 90471 IMMUNIZATION ADMIN: CPT | Mod: S$GLB,,, | Performed by: STUDENT IN AN ORGANIZED HEALTH CARE EDUCATION/TRAINING PROGRAM

## 2023-01-23 PROCEDURE — 1160F PR REVIEW ALL MEDS BY PRESCRIBER/CLIN PHARMACIST DOCUMENTED: ICD-10-PCS | Mod: CPTII,S$GLB,, | Performed by: STUDENT IN AN ORGANIZED HEALTH CARE EDUCATION/TRAINING PROGRAM

## 2023-01-23 PROCEDURE — 90471 FLU VACCINE (QUAD) GREATER THAN OR EQUAL TO 3YO PRESERVATIVE FREE IM: ICD-10-PCS | Mod: S$GLB,,, | Performed by: STUDENT IN AN ORGANIZED HEALTH CARE EDUCATION/TRAINING PROGRAM

## 2023-01-23 PROCEDURE — 90472 IMMUNIZATION ADMIN EACH ADD: CPT | Mod: S$GLB,,, | Performed by: STUDENT IN AN ORGANIZED HEALTH CARE EDUCATION/TRAINING PROGRAM

## 2023-01-23 PROCEDURE — 90686 IIV4 VACC NO PRSV 0.5 ML IM: CPT | Mod: S$GLB,,, | Performed by: STUDENT IN AN ORGANIZED HEALTH CARE EDUCATION/TRAINING PROGRAM

## 2023-01-23 PROCEDURE — 90472 ZOSTER RECOMBINANT VACCINE: ICD-10-PCS | Mod: S$GLB,,, | Performed by: STUDENT IN AN ORGANIZED HEALTH CARE EDUCATION/TRAINING PROGRAM

## 2023-01-23 PROCEDURE — 99396 PREV VISIT EST AGE 40-64: CPT | Mod: 25,S$GLB,, | Performed by: STUDENT IN AN ORGANIZED HEALTH CARE EDUCATION/TRAINING PROGRAM

## 2023-01-30 NOTE — PROGRESS NOTES
Patient ID: Anna Oakley is a 52 y.o. female.     Chief Complaint: Follow-up    Follow-up  Pertinent negatives include no chest pain, coughing, fever, headaches, myalgias, nausea, neck pain, rash, vomiting or weakness.    Patient here for wellness exam. She has no complaints today. She denies chest pain and shortness of breath. She denies fevers and chills. She needs refills of medications.     Review of Systems  Review of Systems   Constitutional:  Negative for fever.   HENT:  Negative for ear pain, hearing loss and sinus pain.    Eyes:  Negative for discharge.   Respiratory:  Negative for cough, shortness of breath and wheezing.    Cardiovascular:  Negative for chest pain, palpitations and leg swelling.   Gastrointestinal:  Negative for blood in stool, constipation, diarrhea, nausea and vomiting.   Genitourinary:  Negative for dysuria, hematuria and urgency.   Musculoskeletal:  Negative for myalgias and neck pain.   Skin:  Negative for rash.   Neurological:  Negative for weakness and headaches.   Endo/Heme/Allergies:  Negative for polydipsia.   Psychiatric/Behavioral:  Negative for depression.    All other systems reviewed and are negative.    Currently Medications  Current Outpatient Medications on File Prior to Visit   Medication Sig Dispense Refill    levothyroxine (SYNTHROID) 88 MCG tablet Take 1 tablet (88 mcg total) by mouth once daily. 90 tablet 3    losartan (COZAAR) 100 MG tablet Take 1 tablet (100 mg total) by mouth once daily. 90 tablet 3    medroxyPROGESTERone (DEPO-PROVERA) 150 mg/mL Syrg INJECT 1ML INTRAMUSCULARLY EVERY 90 DAYS. 1 mL 3    naproxen (NAPROSYN) 500 MG tablet Take 1 tablet (500 mg total) by mouth 2 (two) times daily. 60 tablet 1    cyclobenzaprine (FLEXERIL) 5 MG tablet Take 1-2 tablets (5-10 mg total) by mouth 3 (three) times daily as needed for Muscle spasms. 180 tablet 0     No current facility-administered medications on file prior to visit.       Physical  Exam  Vitals:     "01/23/23 1523   BP: 124/72   BP Location: Right arm   Patient Position: Sitting   Pulse: 91   Temp: 98.2 °F (36.8 °C)   SpO2: 96%   Weight: 87.5 kg (192 lb 14.4 oz)   Height: 5' 6" (1.676 m)      Body mass index is 31.14 kg/m².    Physical Exam  Vitals and nursing note reviewed.   Constitutional:       General: She is not in acute distress.     Appearance: She is not ill-appearing.   HENT:      Head: Normocephalic and atraumatic.      Right Ear: External ear normal.      Left Ear: External ear normal.      Nose: Nose normal.      Mouth/Throat:      Mouth: Mucous membranes are moist.   Eyes:      Extraocular Movements: Extraocular movements intact.      Conjunctiva/sclera: Conjunctivae normal.   Cardiovascular:      Rate and Rhythm: Normal rate and regular rhythm.      Pulses: Normal pulses.      Heart sounds: No murmur heard.  Pulmonary:      Effort: Pulmonary effort is normal. No respiratory distress.      Breath sounds: No wheezing.   Abdominal:      General: There is no distension.      Palpations: Abdomen is soft. There is no mass.      Tenderness: There is no abdominal tenderness.   Musculoskeletal:         General: No swelling.      Cervical back: Normal range of motion.   Skin:     Coloration: Skin is not jaundiced.      Findings: No rash.   Neurological:      General: No focal deficit present.      Mental Status: She is alert and oriented to person, place, and time.   Psychiatric:         Mood and Affect: Mood normal.         Thought Content: Thought content normal.       Labs:    Complete Blood Count  Lab Results   Component Value Date    RBC 4.42 11/22/2021    HGB 13.3 11/22/2021    HCT 40.3 11/22/2021    MCV 91 11/22/2021    MCH 30.1 11/22/2021    MCHC 33.0 11/22/2021    RDW 12.4 11/22/2021     11/22/2021    MPV 12.3 11/22/2021    GRAN 2.9 11/22/2021    GRAN 53.8 11/22/2021    LYMPH 1.9 11/22/2021    LYMPH 36.5 11/22/2021    MONO 0.4 11/22/2021    MONO 6.8 11/22/2021    EOS 0.1 11/22/2021    BASO " 0.03 11/22/2021    EOSINOPHIL 2.1 11/22/2021    BASOPHIL 0.6 11/22/2021    DIFFMETHOD Automated 11/22/2021       Comprehensive Metabolic Panel  No results found for: GLU, BUN, CREATININE, NA, K, CL, PROT, ALBUMIN, BILITOT, AST, ALKPHOS, CO2, ALT, ANIONGAP, EGFRNONAA, ESTGFRAFRICA    TSH  No results found for: TSH    Imaging:  DXA Bone Density Spine And Hip  Narrative: EXAMINATION:  DEXA BONE DENSITY SPINE HIP    CLINICAL HISTORY:  Encounter for screening for osteoporosisosteoporosis screening;    TECHNIQUE:  Bone Mineral Density performed using [employer] (S/N [id number]) reveals good positioning of lumbar spine and hip.    COMPARISON:  none    FINDINGS:  A quantitative bone mineral density was obtained using the DEXA technique.  The bone mineral density measured from L1 through L4 is 1.318 g/cm2.  This corresponds to a T score of 1.0 and a Z score of 0.1.    The bone mineral density within the left femoral neck measures 1.088 g/cm2.  This corresponds to a T score of 0.4 and a Z score of -0.2.    The bone mineral density within the right femoral neck measures 1.058 g/cm2.  This corresponds to a T score of 0.1 and a Z score of -0.4.  Impression: See above    RECOMMENDATIONS:    1) Adequate calcium and Vitamin D therapy    2) Appropriate exercise    3) Consider repeat BMD in 1 year.    EXPLANATION OF RESULTS:    The t-score compares this results to the bone density of a 25 year old of the same gender. The z-score compares this result to the average bone density to people of the same age and gender. The amounts indicate the number of standard deviations above or    below the mean.    * Osteoporosis is generally defined as having a t-score between less than -2.5.    * Osteopenia is generally defined as having a t-score between -1 and -2.5.    Electronically signed by: Rafael Thomason MD  Date:    08/22/2022  Time:    08:32      Assessment/Plan:    1. Essential hypertension  -     CBC Auto Differential; Future  -      Comprehensive metabolic panel; Future; Expected date: 01/23/2023  -     TSH; Future; Expected date: 01/23/2023    2. Hyperglycemia  -     HEMOGLOBIN A1C; Future; Expected date: 01/23/2023    3. Screening for cardiovascular condition  -     LIPID PANEL; Future; Expected date: 01/23/2023    4. Need for shingles vaccine  -     (In Office Administered) Zoster Recombinant Vaccine    5. Need for influenza vaccination  -     Influenza - Quadrivalent *Preferred* (6 months+) (PF)    6. Wellness examination    Other orders  -     Cancel: Influenza (FLUAD) - Quadrivalent (Adjuvanted) *Preferred* (65+) (PF)         Discussed how to stay healthy including: diet, exercise, refraining from smoking and discussed screening exams / tests needed for age, sex and family Hx.        Bharat Mcpherson MD    Answers submitted by the patient for this visit:  Review of Systems Questionnaire (Submitted on 1/20/2023)  activity change: No  unexpected weight change: No  rhinorrhea: No  trouble swallowing: No  visual disturbance: No  chest tightness: No  polyuria: No  difficulty urinating: No  menstrual problem: No  joint swelling: No  arthralgias: No  confusion: No  dysphoric mood: No

## 2023-02-08 ENCOUNTER — LAB VISIT (OUTPATIENT)
Dept: LAB | Facility: HOSPITAL | Age: 53
End: 2023-02-08
Attending: STUDENT IN AN ORGANIZED HEALTH CARE EDUCATION/TRAINING PROGRAM
Payer: COMMERCIAL

## 2023-02-08 DIAGNOSIS — I10 ESSENTIAL HYPERTENSION: ICD-10-CM

## 2023-02-08 DIAGNOSIS — R73.9 HYPERGLYCEMIA: ICD-10-CM

## 2023-02-08 DIAGNOSIS — Z13.6 SCREENING FOR CARDIOVASCULAR CONDITION: ICD-10-CM

## 2023-02-08 LAB
ALBUMIN SERPL BCP-MCNC: 4.3 G/DL (ref 3.5–5.2)
ALBUMIN SERPL BCP-MCNC: 4.3 G/DL (ref 3.5–5.2)
ALP SERPL-CCNC: 72 U/L (ref 38–126)
ALP SERPL-CCNC: 72 U/L (ref 38–126)
ALT SERPL W/O P-5'-P-CCNC: 30 U/L (ref 10–44)
ALT SERPL W/O P-5'-P-CCNC: 30 U/L (ref 10–44)
ANION GAP SERPL CALC-SCNC: 4 MMOL/L (ref 8–16)
ANION GAP SERPL CALC-SCNC: 4 MMOL/L (ref 8–16)
AST SERPL-CCNC: 25 U/L (ref 15–46)
AST SERPL-CCNC: 25 U/L (ref 15–46)
BASOPHILS # BLD AUTO: 0.05 K/UL (ref 0–0.2)
BASOPHILS NFR BLD: 0.8 % (ref 0–1.9)
BILIRUB SERPL-MCNC: 0.4 MG/DL (ref 0.1–1)
BILIRUB SERPL-MCNC: 0.4 MG/DL (ref 0.1–1)
CALCIUM SERPL-MCNC: 9 MG/DL (ref 8.7–10.5)
CALCIUM SERPL-MCNC: 9 MG/DL (ref 8.7–10.5)
CHLORIDE SERPL-SCNC: 105 MMOL/L (ref 95–110)
CHLORIDE SERPL-SCNC: 105 MMOL/L (ref 95–110)
CHOLEST SERPL-MCNC: 173 MG/DL (ref 120–199)
CHOLEST/HDLC SERPL: 3.7 {RATIO} (ref 2–5)
CO2 SERPL-SCNC: 28 MMOL/L (ref 23–29)
CO2 SERPL-SCNC: 28 MMOL/L (ref 23–29)
CREAT SERPL-MCNC: 0.78 MG/DL (ref 0.5–1.4)
CREAT SERPL-MCNC: 0.78 MG/DL (ref 0.5–1.4)
DIFFERENTIAL METHOD: NORMAL
EOSINOPHIL # BLD AUTO: 0.1 K/UL (ref 0–0.5)
EOSINOPHIL NFR BLD: 1.4 % (ref 0–8)
ERYTHROCYTE [DISTWIDTH] IN BLOOD BY AUTOMATED COUNT: 12.7 % (ref 11.5–14.5)
EST. GFR  (NO RACE VARIABLE): >60 ML/MIN/1.73 M^2
EST. GFR  (NO RACE VARIABLE): >60 ML/MIN/1.73 M^2
ESTIMATED AVG GLUCOSE: 111 MG/DL (ref 68–131)
GLUCOSE SERPL-MCNC: 96 MG/DL (ref 70–110)
GLUCOSE SERPL-MCNC: 96 MG/DL (ref 70–110)
HBA1C MFR BLD: 5.5 % (ref 4–5.6)
HCT VFR BLD AUTO: 39.7 % (ref 37–48.5)
HDLC SERPL-MCNC: 47 MG/DL (ref 40–75)
HDLC SERPL: 27.2 % (ref 20–50)
HGB BLD-MCNC: 13 G/DL (ref 12–16)
IMM GRANULOCYTES # BLD AUTO: 0.02 K/UL (ref 0–0.04)
IMM GRANULOCYTES NFR BLD AUTO: 0.3 % (ref 0–0.5)
LDLC SERPL CALC-MCNC: 113.2 MG/DL (ref 63–159)
LYMPHOCYTES # BLD AUTO: 2.1 K/UL (ref 1–4.8)
LYMPHOCYTES NFR BLD: 32.7 % (ref 18–48)
MCH RBC QN AUTO: 29.6 PG (ref 27–31)
MCHC RBC AUTO-ENTMCNC: 32.7 G/DL (ref 32–36)
MCV RBC AUTO: 90 FL (ref 82–98)
MONOCYTES # BLD AUTO: 0.5 K/UL (ref 0.3–1)
MONOCYTES NFR BLD: 7.8 % (ref 4–15)
NEUTROPHILS # BLD AUTO: 3.6 K/UL (ref 1.8–7.7)
NEUTROPHILS NFR BLD: 57 % (ref 38–73)
NONHDLC SERPL-MCNC: 126 MG/DL
NRBC BLD-RTO: 0 /100 WBC
PLATELET # BLD AUTO: 391 K/UL (ref 150–450)
PMV BLD AUTO: 10.8 FL (ref 9.2–12.9)
POTASSIUM SERPL-SCNC: 4.3 MMOL/L (ref 3.5–5.1)
POTASSIUM SERPL-SCNC: 4.3 MMOL/L (ref 3.5–5.1)
PROT SERPL-MCNC: 7.2 G/DL (ref 6–8.4)
PROT SERPL-MCNC: 7.2 G/DL (ref 6–8.4)
RBC # BLD AUTO: 4.39 M/UL (ref 4–5.4)
SODIUM SERPL-SCNC: 137 MMOL/L (ref 136–145)
SODIUM SERPL-SCNC: 137 MMOL/L (ref 136–145)
TRIGL SERPL-MCNC: 64 MG/DL (ref 30–150)
TSH SERPL DL<=0.005 MIU/L-ACNC: 1.12 UIU/ML (ref 0.4–4)
UUN UR-MCNC: 14 MG/DL (ref 7–17)
UUN UR-MCNC: 14 MG/DL (ref 7–17)
WBC # BLD AUTO: 6.37 K/UL (ref 3.9–12.7)

## 2023-02-08 PROCEDURE — 80053 COMPREHEN METABOLIC PANEL: CPT | Mod: PO | Performed by: STUDENT IN AN ORGANIZED HEALTH CARE EDUCATION/TRAINING PROGRAM

## 2023-02-08 PROCEDURE — 84443 ASSAY THYROID STIM HORMONE: CPT | Mod: PO | Performed by: STUDENT IN AN ORGANIZED HEALTH CARE EDUCATION/TRAINING PROGRAM

## 2023-02-08 PROCEDURE — 80061 LIPID PANEL: CPT | Performed by: STUDENT IN AN ORGANIZED HEALTH CARE EDUCATION/TRAINING PROGRAM

## 2023-02-08 PROCEDURE — 85025 COMPLETE CBC W/AUTO DIFF WBC: CPT | Mod: PO | Performed by: STUDENT IN AN ORGANIZED HEALTH CARE EDUCATION/TRAINING PROGRAM

## 2023-02-08 PROCEDURE — 36415 COLL VENOUS BLD VENIPUNCTURE: CPT | Mod: PO | Performed by: STUDENT IN AN ORGANIZED HEALTH CARE EDUCATION/TRAINING PROGRAM

## 2023-02-08 PROCEDURE — 83036 HEMOGLOBIN GLYCOSYLATED A1C: CPT | Mod: PO | Performed by: STUDENT IN AN ORGANIZED HEALTH CARE EDUCATION/TRAINING PROGRAM

## 2023-03-21 ENCOUNTER — OFFICE VISIT (OUTPATIENT)
Dept: URGENT CARE | Facility: URGENT CARE | Age: 53
End: 2023-03-21
Payer: COMMERCIAL

## 2023-03-21 VITALS
DIASTOLIC BLOOD PRESSURE: 70 MMHG | WEIGHT: 230 LBS | OXYGEN SATURATION: 97 % | HEART RATE: 86 BPM | RESPIRATION RATE: 16 BRPM | HEIGHT: 69 IN | SYSTOLIC BLOOD PRESSURE: 110 MMHG | BODY MASS INDEX: 34.07 KG/M2 | TEMPERATURE: 98.7 F

## 2023-03-21 DIAGNOSIS — J01.40 ACUTE NON-RECURRENT PANSINUSITIS: Primary | ICD-10-CM

## 2023-03-21 DIAGNOSIS — H66.003 NON-RECURRENT ACUTE SUPPURATIVE OTITIS MEDIA OF BOTH EARS WITHOUT SPONTANEOUS RUPTURE OF TYMPANIC MEMBRANES: ICD-10-CM

## 2023-03-21 PROBLEM — E78.00 HYPERCHOLESTEROLEMIA: Status: ACTIVE | Noted: 2023-01-26

## 2023-03-21 PROBLEM — F43.22 ADJUSTMENT DISORDER WITH ANXIETY: Status: ACTIVE | Noted: 2021-06-16

## 2023-03-21 PROCEDURE — 99213 OFFICE O/P EST LOW 20 MIN: CPT | Performed by: NURSE PRACTITIONER

## 2023-03-21 NOTE — PATIENT INSTRUCTIONS
Augmentin for sinusitis and double ear infection  Flonase (fluticasone) 2 sprays in each nostril daily until symptoms resolve, then continue 1 spray in each nostril for at least 5 more days.  Take Tylenol or an NSAID such as ibuprofen or naproxen as needed for pain.  May use netti pot with bottled or distilled water and saline packets to flush sinuses.  Afrin (oxymetazoline) nasal spray twice daily for 3 days. Stop after 3 days.  Mucinex (guiafenesin) thins mucus and may help it to loosen more quickly  Saline drops or nasal sprays may loosen mucus.  Sit in the bathroom with the door closed and hot shower running to loosen mucus.  Contact primary care clinic if you do not have any relief from your symptoms after 10 days.  Present to emergency room for significantly increasing pain, persistent high fever >102F, swelling/redness around your eyes, changes in your vision or ability to move your eyes, altered mental status or a severe headache.

## 2023-03-21 NOTE — LETTER
[facility] [place]  [address]  [address]  [place] [address]  Phone: [phone]  Fax: [phone]        3/21/2023    Esmer Smith  [address] [place] [id number] [phone] (home) [phone] (work)    :     1970      To Whom it May Concern:    This patient was seen in clinic today for acute illness. Please excuse medical related absences. May return to work Friday if fever free.    Please contact me for questions or concerns.    Sincerely,    Keiry Caruso, NP

## 2023-04-08 DIAGNOSIS — I10 ESSENTIAL HYPERTENSION: ICD-10-CM

## 2023-04-10 ENCOUNTER — HOSPITAL ENCOUNTER (OUTPATIENT)
Dept: RADIOLOGY | Facility: HOSPITAL | Age: 53
Discharge: HOME | End: 2023-04-10
Attending: NURSE PRACTITIONER
Payer: COMMERCIAL

## 2023-04-10 DIAGNOSIS — Z12.31 ENCOUNTER FOR SCREENING MAMMOGRAM FOR BREAST CANCER: ICD-10-CM

## 2023-04-10 PROCEDURE — 77063 MAMMO DIGITAL SCREENING BILAT WITH TOMO: ICD-10-PCS | Mod: 26,,, | Performed by: RADIOLOGY

## 2023-04-10 PROCEDURE — 77067 SCR MAMMO BI INCL CAD: CPT | Mod: 26,,, | Performed by: RADIOLOGY

## 2023-04-10 PROCEDURE — 77063 BREAST TOMOSYNTHESIS BI: CPT | Mod: 26,,, | Performed by: RADIOLOGY

## 2023-04-10 PROCEDURE — 77067 SCR MAMMO BI INCL CAD: CPT | Mod: TC

## 2023-04-10 PROCEDURE — 77067 MAMMO DIGITAL SCREENING BILAT WITH TOMO: ICD-10-PCS | Mod: 26,,, | Performed by: RADIOLOGY

## 2023-04-10 RX ORDER — POTASSIUM CHLORIDE 1500 MG/1
TABLET, EXTENDED RELEASE ORAL
Qty: 90 TABLET | Refills: 0 | OUTPATIENT
Start: 2023-04-10

## 2023-05-29 DIAGNOSIS — E03.9 HYPOTHYROIDISM, UNSPECIFIED TYPE: ICD-10-CM

## 2023-05-29 RX ORDER — LEVOTHYROXINE SODIUM 88 UG/1
TABLET ORAL
Qty: 90 TABLET | Refills: 2 | Status: SHIPPED | OUTPATIENT
Start: 2023-05-29 | End: 2024-03-04

## 2023-05-29 NOTE — TELEPHONE ENCOUNTER
Refill Decision Note   Anna Oakley  is requesting a refill authorization.  Brief Assessment and Rationale for Refill:  Approve     Medication Therapy Plan:  TSH-WNL      Comments:     Note composed:8:57 AM 05/29/2023             Appointments     Last Visit   1/23/2023 Bharat Mcpherson MD   Next Visit   Visit date not found Bharat Mcpherson MD

## 2023-05-29 NOTE — TELEPHONE ENCOUNTER
No care due was identified.  [facility] Embedded Care Due Messages. Reference number: [id number].   5/29/2023 7:31:01 AM CDT

## 2023-06-10 DIAGNOSIS — I10 ESSENTIAL HYPERTENSION: ICD-10-CM

## 2023-06-12 RX ORDER — POTASSIUM CHLORIDE 1500 MG/1
TABLET, EXTENDED RELEASE ORAL
Qty: 90 TABLET | Refills: 0 | OUTPATIENT
Start: 2023-06-12

## 2023-06-20 ENCOUNTER — TELEPHONE (OUTPATIENT)
Dept: CARDIOLOGY | Facility: CLINIC | Age: 53
End: 2023-06-20
Payer: COMMERCIAL

## 2023-06-20 DIAGNOSIS — I10 ESSENTIAL HYPERTENSION: ICD-10-CM

## 2023-06-20 DIAGNOSIS — E78.00 HYPERCHOLESTEROLEMIA: ICD-10-CM

## 2023-06-20 DIAGNOSIS — I10 HYPERTENSION: Primary | ICD-10-CM

## 2023-06-20 RX ORDER — POTASSIUM CHLORIDE 1500 MG/1
20 TABLET, EXTENDED RELEASE ORAL DAILY
Qty: 90 TABLET | Refills: 0 | Status: SHIPPED | OUTPATIENT
Start: 2023-06-20

## 2023-06-20 RX ORDER — HYDROCHLOROTHIAZIDE 12.5 MG/1
12.5 TABLET ORAL DAILY
Qty: 90 TABLET | Refills: 0 | Status: SHIPPED | OUTPATIENT
Start: 2023-06-20

## 2023-06-20 RX ORDER — AMLODIPINE BESYLATE 10 MG/1
10 TABLET ORAL DAILY
Qty: 90 TABLET | Refills: 0 | Status: SHIPPED | OUTPATIENT
Start: 2023-06-20

## 2023-06-20 NOTE — TELEPHONE ENCOUNTER
[facility] Call Center    Phone Message    May a detailed message be left on voicemail: yes     Reason for Call: Medication Refill Request    Has the patient contacted the pharmacy for the refill? Yes   Name of medication being requested: amLODIPine (NORVASC) 10 MG tablet  hydrochlorothiazide (HYDRODIURIL) 12.5 MG tablet  hydrochlorothiazide (MICROZIDE) 12.5 MG capsule  Provider who prescribed the medication: Dr. Cooper  Pharmacy: [facility]/PH[facility]ACY #[id number] - [place], [place] - [address]    Date medication is needed: ASAP. Pt would like a call to speak to a nurse regarding medication refills. Please return call to discuss.        Action Taken: Other: Cardiology    Travel Screening: Not Applicable     Thank you!  [facility]

## 2023-06-20 NOTE — TELEPHONE ENCOUNTER
Pt last seen 12/2/21, rec 6 month follow-up.  Follow-up order placed.  Return call to pt - pt states she just got done with school and has more free time.    Offered to fill prescriptions if she makes an appointment, otherwise she will need to request from her PCP.  Pt verbalized understanding, call transferred to scheduling.  -cleo

## 2023-06-22 RX ORDER — POTASSIUM CHLORIDE 1500 MG/1
TABLET, EXTENDED RELEASE ORAL
Qty: 90 TABLET | Refills: 0 | Status: SHIPPED | OUTPATIENT
Start: 2023-06-22

## 2023-06-26 ENCOUNTER — CLINICAL SUPPORT (OUTPATIENT)
Dept: FAMILY MEDICINE | Facility: CLINIC | Age: 53
End: 2023-06-26
Payer: COMMERCIAL

## 2023-06-26 DIAGNOSIS — Z23 NEED FOR SHINGLES VACCINE: Primary | ICD-10-CM

## 2023-06-26 PROCEDURE — 90471 ZOSTER RECOMBINANT VACCINE: ICD-10-PCS | Mod: S$GLB,,, | Performed by: STUDENT IN AN ORGANIZED HEALTH CARE EDUCATION/TRAINING PROGRAM

## 2023-06-26 PROCEDURE — 90750 HZV VACC RECOMBINANT IM: CPT | Mod: S$GLB,,, | Performed by: STUDENT IN AN ORGANIZED HEALTH CARE EDUCATION/TRAINING PROGRAM

## 2023-06-26 PROCEDURE — 90750 ZOSTER RECOMBINANT VACCINE: ICD-10-PCS | Mod: S$GLB,,, | Performed by: STUDENT IN AN ORGANIZED HEALTH CARE EDUCATION/TRAINING PROGRAM

## 2023-06-26 PROCEDURE — 90471 IMMUNIZATION ADMIN: CPT | Mod: S$GLB,,, | Performed by: STUDENT IN AN ORGANIZED HEALTH CARE EDUCATION/TRAINING PROGRAM

## 2023-07-03 ENCOUNTER — OFFICE VISIT (OUTPATIENT)
Dept: OBSTETRICS AND GYNECOLOGY | Facility: CLINIC | Age: 53
End: 2023-07-03
Payer: COMMERCIAL

## 2023-07-03 VITALS
WEIGHT: 186.31 LBS | SYSTOLIC BLOOD PRESSURE: 120 MMHG | DIASTOLIC BLOOD PRESSURE: 84 MMHG | HEIGHT: 66 IN | BODY MASS INDEX: 29.94 KG/M2

## 2023-07-03 DIAGNOSIS — Z30.9 ENCOUNTER FOR CONTRACEPTIVE MANAGEMENT, UNSPECIFIED TYPE: ICD-10-CM

## 2023-07-03 DIAGNOSIS — Z12.4 ENCOUNTER FOR PAPANICOLAOU SMEAR FOR CERVICAL CANCER SCREENING: ICD-10-CM

## 2023-07-03 DIAGNOSIS — Z11.51 SCREENING FOR HPV (HUMAN PAPILLOMAVIRUS): ICD-10-CM

## 2023-07-03 DIAGNOSIS — Z12.31 ENCOUNTER FOR SCREENING MAMMOGRAM FOR BREAST CANCER: ICD-10-CM

## 2023-07-03 DIAGNOSIS — Z01.419 WOMEN'S ANNUAL ROUTINE GYNECOLOGICAL EXAMINATION: Primary | ICD-10-CM

## 2023-07-03 PROCEDURE — 88175 CYTOPATH C/V AUTO FLUID REDO: CPT | Performed by: NURSE PRACTITIONER

## 2023-07-03 PROCEDURE — 3008F BODY MASS INDEX DOCD: CPT | Mod: CPTII,S$GLB,, | Performed by: NURSE PRACTITIONER

## 2023-07-03 PROCEDURE — 99396 PREV VISIT EST AGE 40-64: CPT | Mod: S$GLB,,, | Performed by: NURSE PRACTITIONER

## 2023-07-03 PROCEDURE — 99999 PR PBB SHADOW E&M-EST. PATIENT-LVL III: CPT | Mod: PBBFAC,,, | Performed by: NURSE PRACTITIONER

## 2023-07-03 PROCEDURE — 1159F MED LIST DOCD IN RCRD: CPT | Mod: CPTII,S$GLB,, | Performed by: NURSE PRACTITIONER

## 2023-07-03 PROCEDURE — 87624 HPV HI-RISK TYP POOLED RSLT: CPT | Performed by: NURSE PRACTITIONER

## 2023-07-03 PROCEDURE — 99999 PR PBB SHADOW E&M-EST. PATIENT-LVL III: ICD-10-PCS | Mod: PBBFAC,,, | Performed by: NURSE PRACTITIONER

## 2023-07-03 PROCEDURE — 99396 PR PREVENTIVE VISIT,EST,40-64: ICD-10-PCS | Mod: S$GLB,,, | Performed by: NURSE PRACTITIONER

## 2023-07-03 PROCEDURE — 3044F HG A1C LEVEL LT 7.0%: CPT | Mod: CPTII,S$GLB,, | Performed by: NURSE PRACTITIONER

## 2023-07-03 PROCEDURE — 3074F SYST BP LT 130 MM HG: CPT | Mod: CPTII,S$GLB,, | Performed by: NURSE PRACTITIONER

## 2023-07-03 PROCEDURE — 4010F PR ACE/ARB THEARPY RXD/TAKEN: ICD-10-PCS | Mod: CPTII,S$GLB,, | Performed by: NURSE PRACTITIONER

## 2023-07-03 PROCEDURE — 3008F PR BODY MASS INDEX (BMI) DOCUMENTED: ICD-10-PCS | Mod: CPTII,S$GLB,, | Performed by: NURSE PRACTITIONER

## 2023-07-03 PROCEDURE — 1159F PR MEDICATION LIST DOCUMENTED IN MEDICAL RECORD: ICD-10-PCS | Mod: CPTII,S$GLB,, | Performed by: NURSE PRACTITIONER

## 2023-07-03 PROCEDURE — 3074F PR MOST RECENT SYSTOLIC BLOOD PRESSURE < 130 MM HG: ICD-10-PCS | Mod: CPTII,S$GLB,, | Performed by: NURSE PRACTITIONER

## 2023-07-03 PROCEDURE — 3079F DIAST BP 80-89 MM HG: CPT | Mod: CPTII,S$GLB,, | Performed by: NURSE PRACTITIONER

## 2023-07-03 PROCEDURE — 3044F PR MOST RECENT HEMOGLOBIN A1C LEVEL <7.0%: ICD-10-PCS | Mod: CPTII,S$GLB,, | Performed by: NURSE PRACTITIONER

## 2023-07-03 PROCEDURE — 3079F PR MOST RECENT DIASTOLIC BLOOD PRESSURE 80-89 MM HG: ICD-10-PCS | Mod: CPTII,S$GLB,, | Performed by: NURSE PRACTITIONER

## 2023-07-03 PROCEDURE — 4010F ACE/ARB THERAPY RXD/TAKEN: CPT | Mod: CPTII,S$GLB,, | Performed by: NURSE PRACTITIONER

## 2023-07-03 RX ORDER — MEDROXYPROGESTERONE ACETATE 150 MG/ML
INJECTION, SUSPENSION INTRAMUSCULAR
Qty: 1 ML | Refills: 3 | Status: SHIPPED | OUTPATIENT
Start: 2023-07-03

## 2023-07-03 NOTE — PROGRESS NOTES
CC: Annual  HPI: Pt is a 52 y.o.  female who presents for routine annual exam. She uses Depo  for contraception. She does not want STD screening.  Denies any GYN complaints.  The patient participates in regular exercise: needs more.  The patient does not smoke.  The patient wears seatbelts.   Pt denies any domestic violence. Colonoscopy is UTD - due in 2033. She is in need of a screening MMG.  DEXA in 8/22 NW.           ROS:  GENERAL: Feeling well overall. Denies fever or chills.   SKIN: Denies rash or lesions.   HEAD: Denies head injury or headache.   NODES: Denies enlarged lymph nodes.   CHEST: Denies chest pain or shortness of breath.   CARDIOVASCULAR: Denies palpitations or left sided chest pain.   ABDOMEN: No abdominal pain, constipation, diarrhea, nausea, vomiting or rectal bleeding.   URINARY: No dysuria, hematuria, or burning on urination.  REPRODUCTIVE: See HPI.   BREASTS: Denies pain, lumps, or nipple discharge.   HEMATOLOGIC: No easy bruisability or excessive bleeding.   MUSCULOSKELETAL: Denies joint pain or swelling.   NEUROLOGIC: Denies syncope or weakness.   PSYCHIATRIC: Denies depression, anxiety or mood swings.    PE:   APPEARANCE: Well nourished, well developed, Black or  female in no acute distress.  NODES: no cervical, supraclavicular, or inguinal lymphadenopathy  BREASTS: Symmetrical, no skin changes or visible lesions. No palpable masses, nipple discharge or adenopathy bilaterally.  ABDOMEN: Soft. No tenderness or masses. No distention. No hernias palpated. No CVA tenderness.  VULVA: No lesions. Normal external female genitalia.  URETHRAL MEATUS: Normal size and location, no lesions, no prolapse.  URETHRA: No masses, tenderness, or prolapse.  VAGINA: Moist. No lesions or lacerations noted. No abnormal discharge present. No odor present.   CERVIX: No lesions or discharge. No cervical motion tenderness.   UTERUS: Normal size, regular shape, mobile, non-tender.  ADNEXA: No  tenderness. No fullness or masses palpated in the adnexal regions.   ANUS PERINEUM: Normal.      Diagnosis:  1. Women's annual routine gynecological examination    2. Encounter for Papanicolaou smear for cervical cancer screening    3. Screening for HPV (human papillomavirus)    4. Encounter for screening mammogram for breast cancer    5. Encounter for contraceptive management, unspecified type        Plan:   Pap/ HPV  MMG   Depo refilled  Will plan to DC in 1 yr   Recommend a daily multivitamin along with vitamin D of at least 800 IU daily to support bone health.       Orders Placed This Encounter    HPV High Risk Genotypes, PCR    Mammo Digital Screening Bilat w/ [facility]    Liquid-Based Pap Smear, Screening    medroxyPROGESTERone (DEPO-PROVERA) 150 mg/mL Syrg       Patient was counseled today on the new ACS guidelines for cervical cytology screening as well as the current recommendations for breast cancer screening. She was counseled to follow up with her PCP for other routine health maintenance. Counseling session lasted approximately 10 minutes, and all her questions were answered.    Follow-up with me in 1 year for routine exam    RENÉE Tucker

## 2023-07-10 ENCOUNTER — PATIENT MESSAGE (OUTPATIENT)
Dept: OBSTETRICS AND GYNECOLOGY | Facility: CLINIC | Age: 53
End: 2023-07-10
Payer: COMMERCIAL

## 2023-07-10 DIAGNOSIS — N76.0 BV (BACTERIAL VAGINOSIS): Primary | ICD-10-CM

## 2023-07-10 DIAGNOSIS — B96.89 BV (BACTERIAL VAGINOSIS): Primary | ICD-10-CM

## 2023-07-10 LAB
FINAL PATHOLOGIC DIAGNOSIS: NORMAL
HPV HR 12 DNA SPEC QL NAA+PROBE: POSITIVE
HPV16 AG SPEC QL: NEGATIVE
HPV18 DNA SPEC QL NAA+PROBE: NEGATIVE
Lab: NORMAL

## 2023-07-10 RX ORDER — METRONIDAZOLE 500 MG/1
500 TABLET ORAL 2 TIMES DAILY
Qty: 14 TABLET | Refills: 0 | Status: SHIPPED | OUTPATIENT
Start: 2023-07-10 | End: 2023-07-17

## 2023-11-16 ENCOUNTER — PATIENT MESSAGE (OUTPATIENT)
Dept: ADMINISTRATIVE | Facility: HOSPITAL | Age: 53
End: 2023-11-16
Payer: COMMERCIAL

## 2023-11-17 ENCOUNTER — PATIENT OUTREACH (OUTPATIENT)
Dept: ADMINISTRATIVE | Facility: HOSPITAL | Age: 53
End: 2023-11-17
Payer: COMMERCIAL

## 2023-11-17 ENCOUNTER — PATIENT MESSAGE (OUTPATIENT)
Dept: ADMINISTRATIVE | Facility: HOSPITAL | Age: 53
End: 2023-11-17
Payer: COMMERCIAL

## 2023-11-20 ENCOUNTER — OFFICE VISIT (OUTPATIENT)
Dept: PAIN MEDICINE | Facility: CLINIC | Age: 53
End: 2023-11-20
Payer: COMMERCIAL

## 2023-11-20 DIAGNOSIS — G89.29 CHRONIC LOW BACK PAIN WITHOUT SCIATICA, UNSPECIFIED BACK PAIN LATERALITY: ICD-10-CM

## 2023-11-20 DIAGNOSIS — M47.816 LUMBAR SPONDYLOSIS: ICD-10-CM

## 2023-11-20 DIAGNOSIS — M54.41 CHRONIC RIGHT-SIDED LOW BACK PAIN WITH RIGHT-SIDED SCIATICA: ICD-10-CM

## 2023-11-20 DIAGNOSIS — M54.16 LUMBAR RADICULOPATHY, CHRONIC: ICD-10-CM

## 2023-11-20 DIAGNOSIS — M54.16 LUMBAR RADICULOPATHY: ICD-10-CM

## 2023-11-20 DIAGNOSIS — G89.29 CHRONIC RIGHT-SIDED LOW BACK PAIN WITH RIGHT-SIDED SCIATICA: ICD-10-CM

## 2023-11-20 DIAGNOSIS — M54.50 CHRONIC LOW BACK PAIN WITHOUT SCIATICA, UNSPECIFIED BACK PAIN LATERALITY: ICD-10-CM

## 2023-11-20 DIAGNOSIS — M51.36 DDD (DEGENERATIVE DISC DISEASE), LUMBAR: ICD-10-CM

## 2023-11-20 DIAGNOSIS — M54.17 LUMBOSACRAL RADICULOPATHY: Primary | ICD-10-CM

## 2023-11-20 DIAGNOSIS — M53.3 SACROILIAC JOINT DYSFUNCTION: ICD-10-CM

## 2023-11-20 DIAGNOSIS — M47.819 ARTHROPATHY OF FACET JOINTS AT MULTIPLE LEVELS: ICD-10-CM

## 2023-11-20 PROCEDURE — 1160F RVW MEDS BY RX/DR IN RCRD: CPT | Mod: CPTII,95,, | Performed by: NURSE PRACTITIONER

## 2023-11-20 PROCEDURE — 4010F ACE/ARB THERAPY RXD/TAKEN: CPT | Mod: CPTII,95,, | Performed by: NURSE PRACTITIONER

## 2023-11-20 PROCEDURE — 3044F HG A1C LEVEL LT 7.0%: CPT | Mod: CPTII,95,, | Performed by: NURSE PRACTITIONER

## 2023-11-20 PROCEDURE — 1160F PR REVIEW ALL MEDS BY PRESCRIBER/CLIN PHARMACIST DOCUMENTED: ICD-10-PCS | Mod: CPTII,95,, | Performed by: NURSE PRACTITIONER

## 2023-11-20 PROCEDURE — 3044F PR MOST RECENT HEMOGLOBIN A1C LEVEL <7.0%: ICD-10-PCS | Mod: CPTII,95,, | Performed by: NURSE PRACTITIONER

## 2023-11-20 PROCEDURE — 1159F MED LIST DOCD IN RCRD: CPT | Mod: CPTII,95,, | Performed by: NURSE PRACTITIONER

## 2023-11-20 PROCEDURE — 4010F PR ACE/ARB THEARPY RXD/TAKEN: ICD-10-PCS | Mod: CPTII,95,, | Performed by: NURSE PRACTITIONER

## 2023-11-20 PROCEDURE — 99214 PR OFFICE/OUTPT VISIT, EST, LEVL IV, 30-39 MIN: ICD-10-PCS | Mod: 95,,, | Performed by: NURSE PRACTITIONER

## 2023-11-20 PROCEDURE — 1159F PR MEDICATION LIST DOCUMENTED IN MEDICAL RECORD: ICD-10-PCS | Mod: CPTII,95,, | Performed by: NURSE PRACTITIONER

## 2023-11-20 PROCEDURE — 99214 OFFICE O/P EST MOD 30 MIN: CPT | Mod: 95,,, | Performed by: NURSE PRACTITIONER

## 2023-11-20 RX ORDER — NAPROXEN 500 MG/1
500 TABLET ORAL 2 TIMES DAILY
Qty: 60 TABLET | Refills: 1 | OUTPATIENT
Start: 2023-11-20

## 2023-11-20 RX ORDER — NAPROXEN 500 MG/1
500 TABLET ORAL 2 TIMES DAILY
Qty: 60 TABLET | Refills: 1 | Status: SHIPPED | OUTPATIENT
Start: 2023-11-20 | End: 2024-01-29

## 2023-11-20 NOTE — PROGRESS NOTES
[facility]  Established Clinic Visit   telemedicine Encounter    Telemedicine Bundle:  This visit was completed during the Coronavirus Crisis to enhance patient safety.  The patient location is: patient's home  The chief complaint leading to consultation is: Low-back Pain and Leg Pain (Right )  Visit type: Virtual visit with synchronous audio and video  Total time spent with patient: 15 minutes   Each patient to whom he or she provides medical services by telemedicine is:    (1) informed of the relationship between the physician and patient and the respective role of any other health care provider with respect to management of the patient  (2) notified that he or she may decline to receive medical services by telemedicine and may withdraw from such care at any time.      Chief Complaint:   Chief Complaint   Patient presents with    Low-back Pain    Leg Pain     Right          History of Present Illness: Anna Oakley is a 53 y.o. female here for low back pain and right leg pain. She presents with a history of low back pain for about 10 years without history of trauma or inciting event. She pain is located in lower lumbar region and raditating pain to right thigh/leg; anterolateral thigh and back of right calf. Her pain has been slowly progressive and has worsened in the past 2 years. She states her pain is most exacerbated when extending spine from a flexed position.     Onset: years  Location: low back  Radiation: right leg  Timing: intermittent  Quality: Aching, Burning, Tingling, Numb, Sharp and Shooting  Exacerbating Factors: sitting  Alleviating Factors: medications  Associated Symptoms: denies urinary incontinence, bowel incontinence, significant weight loss, significant motor weakness and loss of sensations    Severity: Currently: 8/10   Typical Range: 4-10/10     Exacerbation: 10/10     Interval History 11/20/2023:  53-year-old presents virtually, she is complaining of low back and  radiating symptoms in her legs.  She is not been seen for approximately 1 year she was provided a caudal COLBY on 07/20/2022 that provided her 100% relief.  She is reports a pain slowly has returned over the last few weeks she would like to be considered for a repeat injection.  She denies any recent incident or trauma she denies any profound weakness denies any saddle anesthesia at this time.    Interval History 8/10/2022- Ms Oakley presents virtually, she is s/p a Caudal COLBY on 7/20/22 she is reporting 100% relief of her left sided pain however her right side has started to hurt. Her right sided pian is in the right LB that radiates into her right hip.     Interval history 06/22/2022):  Anna Oakley returns today for follow up.  At the last clinic visit,  She was s/f a Lumbar ILESI targeting L5-S1 that  provided 70% relief with improved functionaliy especially in her legs, however she is still complaning of axial low back pain that  Presents in a bandlike distribution.   Currently, the LBP continues leg pain is much better following Lumbar COLBY      No significant interval events or traumas. No change in bowel or bladder function, & no new weakness or numbness is reported. No new musculoskeletal pain complaints.    Current Pain Scales:  Current: 0/10              Typical Range: 0-0/10      Previous Interventions:  -07/20/2022 Caudal epidural steroid injection 100% improvement of her left-sided pain  -05/25/2022  L5-S1 Interlaminar Epidural Steroid Injection under   - 04/20/2022-Bilateral L5  0% relief    Previous Therapies:  PT/OT: no   Relevant Surgery: no   Previous Medications:   - NSAIDS:   - Muscle Relaxants: Flexeril  - TCAs:  - SNRIs:   - Topicals:   - Anticonvulsants:  - Opioids: Hydrocodone    Current Pain Medications:  Naproxen p.r.n.    Blood Thinners: None    Full Medication List:    Current Outpatient Medications:     cyclobenzaprine (FLEXERIL) 5 MG tablet, Take 1-2 tablets (5-10 mg total) by  mouth 3 (three) times daily as needed for Muscle spasms., Disp: 180 tablet, Rfl: 0    levothyroxine (SYNTHROID) 88 MCG tablet, TAKE 1 TABLET BY MOUTH EVERY DAY, Disp: 90 tablet, Rfl: 2    losartan (COZAAR) 100 MG tablet, TAKE 1 TABLET BY MOUTH EVERY DAY, Disp: 90 tablet, Rfl: 3    medroxyPROGESTERone (DEPO-PROVERA) 150 mg/mL Syrg, INJECT 1ML INTRAMUSCULARLY EVERY 90 DAYS., Disp: 1 mL, Rfl: 3    naproxen (NAPROSYN) 500 MG tablet, Take 1 tablet (500 mg total) by mouth 2 (two) times daily., Disp: 60 tablet, Rfl: 1     Review of Systems:  Review of Systems   Constitutional:  Negative for chills, fever, malaise/fatigue and weight loss.   HENT:  Negative for ear pain, nosebleeds and sore throat.    Eyes:  Negative for blurred vision, pain and redness.   Respiratory:  Negative for cough, shortness of breath and wheezing.    Cardiovascular:  Negative for chest pain, palpitations and leg swelling.   Gastrointestinal:  Negative for abdominal pain, constipation, diarrhea, heartburn, nausea and vomiting.   Genitourinary:  Negative for dysuria, flank pain, frequency and urgency.   Musculoskeletal:  Positive for back pain. Negative for falls, joint pain, myalgias and neck pain.   Skin:  Negative for itching and rash.   Neurological:  Positive for tingling. Negative for dizziness, focal weakness, seizures, weakness and headaches.   Endo/Heme/Allergies:  Does not bruise/bleed easily.   Psychiatric/Behavioral:  Negative for depression and memory loss. The patient is not nervous/anxious and does not have insomnia.        Allergies:  Patient has no known allergies.     Medical History:   has a past medical history of Hypertension.    Surgical History:   has a past surgical history that includes Tubal ligation; Ablation; Transforaminal epidural injection of steroid (Right, 4/20/2022); Epidural steroid injection into lumbar spine (N/A, 5/25/2022); Injection of anesthetic agent around medial branch nerves innervating lumbar facet joint  (Bilateral, 7/13/2022); Caudal epidural steroid injection (N/A, 7/20/2022); and Colonoscopy (N/A, 11/2/2022).  2010 fusion of cervical spine    Family History:  family history includes Breast cancer in her mother. Mother had a cervical fusion in 1995    Social History:   reports that she has never smoked. She does not have any smokeless tobacco history on file. She reports that she does not currently use alcohol. She reports that she does not use drugs.    Physical Exam:  There were no vitals taken for this visit.  GEN: No acute distress. Calm, comfortable  HENT: Normocephalic, atraumatic, moist mucous membranes  EYE: Anicteric sclera, non-injected.   CV: Non-diaphoretic.   RESP: Breathing comfortably. Chest expansion symmetric.  PSYCH: Pleasant mood and appropriate affect. Recent and remote memory intact.       Imaging:  - X-ray lumbar spine 10/14/21:  The alignment of the lumbar spine is normal.  The vertebral body heights are well maintained.  Disc space narrowing L5-S1.  No fracture, no osseous lesions.  The sacroiliac joints appear symmetrical on the AP view.  The soft tissues appear normal.    Labs:  BMP  Lab Results   Component Value Date     02/08/2023     02/08/2023    K 4.3 02/08/2023    K 4.3 02/08/2023     02/08/2023     02/08/2023    CO2 28 02/08/2023    CO2 28 02/08/2023    BUN 14 02/08/2023    BUN 14 02/08/2023    CREATININE 0.78 02/08/2023    CREATININE 0.78 02/08/2023    CALCIUM 9.0 02/08/2023    CALCIUM 9.0 02/08/2023    ANIONGAP 4 (L) 02/08/2023    ANIONGAP 4 (L) 02/08/2023    ESTGFRAFRICA >60.0 11/22/2021    EGFRNONAA >60.0 11/22/2021     Lab Results   Component Value Date    ALT 30 02/08/2023    ALT 30 02/08/2023    AST 25 02/08/2023    AST 25 02/08/2023    ALKPHOS 72 02/08/2023    ALKPHOS 72 02/08/2023    BILITOT 0.4 02/08/2023    BILITOT 0.4 02/08/2023     Lab Results   Component Value Date     02/08/2023       Assessment:  Anna Oakley is a 53 y.o.  female with the following diagnoses based on history, exam, and imaging:    Problem List Items Addressed This Visit          Neuro    Lumbar radiculopathy    Relevant Medications    naproxen (NAPROSYN) 500 MG tablet    Lumbar spondylosis       Orthopedic    Sacroiliac joint dysfunction    Relevant Medications    naproxen (NAPROSYN) 500 MG tablet    Chronic right-sided low back pain with right-sided sciatica    Relevant Medications    naproxen (NAPROSYN) 500 MG tablet     Other Visit Diagnoses       Lumbosacral radiculopathy    -  Primary    Chronic low back pain without sciatica, unspecified back pain laterality        DDD (degenerative disc disease), lumbar        Arthropathy of facet joints at multiple levels        Lumbar radiculopathy, chronic                  This is a pleasant 53 y.o. lady presenting with:     - Chronic low back pain with radiating symptoms into RLE. No weakness on exam. History and exam support her primary pain generator to be her right SI joint. She also has component of right GTB and possible lumbar radiculopathy.  - Comorbidities: HypoTH.     4/7/2022- 52 y/o with Hx of chronic low back, bilateral hip with radiating symptoms in her right leg. Upon Hx  And exam today I do believe she may have some right SI joint involvement. He primary issue could be NF or CS. I will order an Lumbar MRI today to rule out pathology. No weakness upon exam, + Patricks more on the right.     05/11/20223917-64-erhs-old female with a history of chronic low back pain with radicular symptoms into her anterior thigh that sometimes radiates into her lower extremities.  She is status post a bilateral TF COLBY targeting L5-S1 that provided 0% relief.  Upon history and exam I have ruled out that her pain could possibly be bilateral SI joint inflammation, recommended that we attempt a lumbar interlaminar COLBY targeting L5-S1.  Her lumbar MRI shows at L5-S1 moderate to severe degenerative disc disease with high-grade disc space  narrowing.  Type 1 endplate marrow signal changes.  She has no significant central canal or neural foraminal stenosis at this level.    6/22/2022- 50 y/o female with a hx of CLBP with right radicular symptoms presents today following a Lumbar COLBY targeting L5-S1. She reported improved rihgt leg pain, but her low back pain continues.  Her MRI is significant for multilevel facet arthritis beginning at L2 to S1. Her pain continues in the area of L4/5 and L5/S1 across her low back.     [birth year]-89-ywoe-old female with history of chronic low back pain with radicular symptoms into her anterior thighs.  She is status post SI that improved her left-sided low back pain, however she is now having right-sided low back pain that radiates into her right hip.  Upon review of her recent injection it appears that Dr. Johnson may have but more the medicine the left-sided she was complaining about this during her clinic visit with him.  She is now having mainly right-sided low back pain I discussed with patient I will consult Dr. Johnson to see if he would like to schedule her for additional pain intervention to help with right-sided low back pain.  Patient verbalized understanding and agreed.    11/20/2023-53 year female with history of chronic low back with radiculopathy that radiates into her anterior thighs.  She is known to our clinic and was previously provided a caudal COLBY that appear to provide her with 12-16 months of relief.  Based on her history her pain is related to moderate to severe degenerative disc disease with high-grade disc space narrowing.  Type 1 endplate marrow signal changes.  Mild disc bulging osteophyte with mild displacement of the ventral epidural fat.  Minor approximation of the ventral thecal sac.   Recommended repeat a caudal COLBY patient verbalized understanding and agreed.    Treatment Plan:   - PT/OT/HEP: Continue PT.  Discussed benefits of exercise for   pain.   - Procedures:  Scheduled for a Caudal  COLBY with a right paramedial approach to help with now right sided LBP.                             Consider Right SI joint inj   - Medications:  Continue and refill Naproxen 500mg BID PRN if helping if not discontinue. Educated patient about potential GI, cardiovascular, and renal risks and stressed importance of PRN usage.                Continue Flexeril 5-10 daily PRN   - Imaging:  Reviewed  - Labs: Reviewed.  Medications are appropriately dosed for current hepatorenal function.    Follow Up: virtually or in clinic     HERIBERTO JessicaC  Inter[facility] Pain Management      Disclaimer: This note was partly generated using dictation software which may occasionally result in transcription errors.

## 2023-11-21 ENCOUNTER — PATIENT MESSAGE (OUTPATIENT)
Dept: PAIN MEDICINE | Facility: CLINIC | Age: 53
End: 2023-11-21
Payer: COMMERCIAL

## 2023-11-21 ENCOUNTER — PATIENT OUTREACH (OUTPATIENT)
Dept: ADMINISTRATIVE | Facility: HOSPITAL | Age: 53
End: 2023-11-21
Payer: COMMERCIAL

## 2023-11-21 DIAGNOSIS — Z78.0 MENOPAUSE: Primary | ICD-10-CM

## 2024-01-27 DIAGNOSIS — M54.16 LUMBAR RADICULOPATHY: ICD-10-CM

## 2024-01-27 DIAGNOSIS — G89.29 CHRONIC RIGHT-SIDED LOW BACK PAIN WITH RIGHT-SIDED SCIATICA: ICD-10-CM

## 2024-01-27 DIAGNOSIS — M54.41 CHRONIC RIGHT-SIDED LOW BACK PAIN WITH RIGHT-SIDED SCIATICA: ICD-10-CM

## 2024-01-27 DIAGNOSIS — M53.3 SACROILIAC JOINT DYSFUNCTION: ICD-10-CM

## 2024-01-29 RX ORDER — NAPROXEN 500 MG/1
500 TABLET ORAL 2 TIMES DAILY
Qty: 60 TABLET | Refills: 1 | Status: SHIPPED | OUTPATIENT
Start: 2024-01-29

## 2024-02-14 DIAGNOSIS — I10 ESSENTIAL HYPERTENSION: ICD-10-CM

## 2024-02-15 RX ORDER — AMLODIPINE BESYLATE 10 MG/1
10 TABLET ORAL DAILY
Qty: 90 TABLET | Refills: 0 | OUTPATIENT
Start: 2024-02-15

## 2024-02-15 RX ORDER — POTASSIUM CHLORIDE 1500 MG/1
20 TABLET, EXTENDED RELEASE ORAL DAILY
Qty: 90 TABLET | Refills: 0 | OUTPATIENT
Start: 2024-02-15

## 2024-02-15 NOTE — TELEPHONE ENCOUNTER
Refill requests refused - pt has not been seen since 12/02/21.    Pt was previously instructed to make appt for refills, and she did, but then either no showed, or cancelled appts.  Provider no longer with this group and pt will need to establish with new cardiologist.

## 2024-02-19 ENCOUNTER — TELEPHONE (OUTPATIENT)
Facility: CLINIC | Age: 54
End: 2024-02-19
Payer: COMMERCIAL

## 2024-02-19 NOTE — TELEPHONE ENCOUNTER
[facility] Call Center    Phone Message    May a detailed message be left on voicemail: yes     Reason for Call: Medication Refill Request    Has the patient contacted the pharmacy for the refill? Yes   Name of medication being requested: amlodipine   Provider who prescribed the medication: Allison  Pharmacy: [id number] - [place], [place] - [address]    Date medication is needed: asap       Action Taken: Other: cardio    Travel Screening: Not Applicable

## 2024-02-19 NOTE — TELEPHONE ENCOUNTER
Refill requests refused - pt has not been seen since 12/02/21.     Pt was previously instructed to make appt for refills, and she did, but then either no showed, or cancelled appts.  Provider no longer with this group and pt will need to establish with new cardiologist.    Discussed with pt.  Offered to make appt - pt refused, said she will just have managed through her PCP now.  -ral

## 2024-03-04 DIAGNOSIS — E03.9 HYPOTHYROIDISM, UNSPECIFIED TYPE: ICD-10-CM

## 2024-03-04 RX ORDER — LEVOTHYROXINE SODIUM 88 UG/1
TABLET ORAL
Qty: 90 TABLET | Refills: 0 | Status: SHIPPED | OUTPATIENT
Start: 2024-03-04 | End: 2024-06-03

## 2024-03-04 NOTE — TELEPHONE ENCOUNTER
Care Due:                  Date            Visit Type   Department     Provider  --------------------------------------------------------------------------------                                MYCHART                              FOLLOWUP/OF  [facility]  Last Visit: 01-      FICE VISIT   MEDICINE       Bharat Mcpherson  Next Visit: None Scheduled  None         None Found                                                            Last  Test          Frequency    Reason                     Performed    Due Date  --------------------------------------------------------------------------------    Office Visit  15 months..  losartan.................  01- 04-    CMP.........  12 months..  losartan.................  02- 02-    [employer] Embedded Care Due Messages. Reference number: [id number].   3/04/2024 8:11:21 AM CST

## 2024-03-04 NOTE — TELEPHONE ENCOUNTER
Refill Routing Note   Medication(s) are not appropriate for processing by [facility] for the following reason(s):        Required labs outdated    [facility] action(s):  Defer   Requires appointment : Yes     Requires labs : Yes             Appointments  past 12m or future 3m with PCP    Date Provider   Last Visit   1/23/2023 Bharat Mcpherson MD   Next Visit   Visit date not found Bharat Mcpherson MD   ED visits in past 90 days: 0        Note composed:2:31 PM 03/04/2024

## 2024-03-27 DIAGNOSIS — I10 ESSENTIAL HYPERTENSION: ICD-10-CM

## 2024-03-27 DIAGNOSIS — I51.7 LVH (LEFT VENTRICULAR HYPERTROPHY): ICD-10-CM

## 2024-03-27 NOTE — TELEPHONE ENCOUNTER
No care due was identified.  [facility] Embedded Care Due Messages. Reference number: [id number].   3/27/2024 1:16:30 PM CDT

## 2024-03-28 ENCOUNTER — PATIENT MESSAGE (OUTPATIENT)
Dept: FAMILY MEDICINE | Facility: CLINIC | Age: 54
End: 2024-03-28
Payer: COMMERCIAL

## 2024-03-28 RX ORDER — LOSARTAN POTASSIUM 100 MG/1
TABLET ORAL
Qty: 90 TABLET | Refills: 3 | Status: SHIPPED | OUTPATIENT
Start: 2024-03-28 | End: 2024-06-12

## 2024-03-28 NOTE — TELEPHONE ENCOUNTER
Refill Routing Note   Medication(s) are not appropriate for processing by [facility] for the following reason(s):        Required labs outdated    [facility] action(s):  Defer               Appointments  past 12m or future 3m with PCP    Date Provider   Last Visit   1/23/2023 Bharat Mcpherson MD   Next Visit   Visit date not found Bharat Mcpherson MD   ED visits in past 90 days: 0        Note composed:3:24 AM 03/28/2024

## 2024-04-03 DIAGNOSIS — I10 ESSENTIAL HYPERTENSION: ICD-10-CM

## 2024-04-23 ENCOUNTER — HOSPITAL ENCOUNTER (OUTPATIENT)
Dept: RADIOLOGY | Facility: HOSPITAL | Age: 54
Discharge: HOME | End: 2024-04-23
Attending: NURSE PRACTITIONER
Payer: COMMERCIAL

## 2024-04-23 VITALS — HEIGHT: 66 IN | WEIGHT: 186 LBS | BODY MASS INDEX: 29.89 KG/M2

## 2024-04-23 DIAGNOSIS — Z12.31 ENCOUNTER FOR SCREENING MAMMOGRAM FOR BREAST CANCER: ICD-10-CM

## 2024-04-23 PROCEDURE — 77063 BREAST TOMOSYNTHESIS BI: CPT | Mod: TC,PN

## 2024-04-23 PROCEDURE — 77063 BREAST TOMOSYNTHESIS BI: CPT | Mod: 26,,, | Performed by: RADIOLOGY

## 2024-04-23 PROCEDURE — 77067 SCR MAMMO BI INCL CAD: CPT | Mod: 26,,, | Performed by: RADIOLOGY

## 2024-05-03 ENCOUNTER — LAB VISIT (OUTPATIENT)
Dept: LAB | Facility: HOSPITAL | Age: 54
End: 2024-05-03
Attending: STUDENT IN AN ORGANIZED HEALTH CARE EDUCATION/TRAINING PROGRAM
Payer: COMMERCIAL

## 2024-05-03 ENCOUNTER — OFFICE VISIT (OUTPATIENT)
Dept: FAMILY MEDICINE | Facility: CLINIC | Age: 54
End: 2024-05-03
Payer: COMMERCIAL

## 2024-05-03 VITALS
DIASTOLIC BLOOD PRESSURE: 86 MMHG | SYSTOLIC BLOOD PRESSURE: 122 MMHG | HEIGHT: 65 IN | BODY MASS INDEX: 31.05 KG/M2 | WEIGHT: 186.38 LBS

## 2024-05-03 DIAGNOSIS — L65.9 HAIR LOSS: ICD-10-CM

## 2024-05-03 DIAGNOSIS — E03.9 HYPOTHYROIDISM, UNSPECIFIED TYPE: Primary | ICD-10-CM

## 2024-05-03 DIAGNOSIS — Z13.6 SCREENING FOR CARDIOVASCULAR CONDITION: ICD-10-CM

## 2024-05-03 DIAGNOSIS — R73.9 HYPERGLYCEMIA: ICD-10-CM

## 2024-05-03 DIAGNOSIS — I10 ESSENTIAL HYPERTENSION: ICD-10-CM

## 2024-05-03 DIAGNOSIS — E03.9 HYPOTHYROIDISM, UNSPECIFIED TYPE: ICD-10-CM

## 2024-05-03 LAB
ALBUMIN SERPL BCP-MCNC: 5 G/DL (ref 3.5–5.2)
ALP SERPL-CCNC: 80 U/L (ref 38–126)
ALT SERPL W/O P-5'-P-CCNC: 18 U/L (ref 10–44)
ANION GAP SERPL CALC-SCNC: 13 MMOL/L (ref 8–16)
AST SERPL-CCNC: 22 U/L (ref 15–46)
BASOPHILS # BLD AUTO: 0.05 K/UL (ref 0–0.2)
BASOPHILS NFR BLD: 0.7 % (ref 0–1.9)
BILIRUB SERPL-MCNC: 0.8 MG/DL (ref 0.1–1)
CALCIUM SERPL-MCNC: 9.9 MG/DL (ref 8.7–10.5)
CHLORIDE SERPL-SCNC: 104 MMOL/L (ref 95–110)
CHOLEST SERPL-MCNC: 200 MG/DL (ref 120–199)
CHOLEST/HDLC SERPL: 4.4 {RATIO} (ref 2–5)
CO2 SERPL-SCNC: 22 MMOL/L (ref 23–29)
CREAT SERPL-MCNC: 0.77 MG/DL (ref 0.5–1.4)
CRP SERPL-MCNC: 0.07 MG/DL (ref 0–1)
DIFFERENTIAL METHOD BLD: NORMAL
EOSINOPHIL # BLD AUTO: 0.1 K/UL (ref 0–0.5)
EOSINOPHIL NFR BLD: 1.1 % (ref 0–8)
ERYTHROCYTE [DISTWIDTH] IN BLOOD BY AUTOMATED COUNT: 12.6 % (ref 11.5–14.5)
ERYTHROCYTE [SEDIMENTATION RATE] IN BLOOD BY PHOTOMETRIC METHOD: 17 MM/HR (ref 0–36)
EST. GFR  (NO RACE VARIABLE): >60 ML/MIN/1.73 M^2
ESTIMATED AVG GLUCOSE: 100 MG/DL (ref 68–131)
GLUCOSE SERPL-MCNC: 89 MG/DL (ref 70–110)
HBA1C MFR BLD: 5.1 % (ref 4–5.6)
HCT VFR BLD AUTO: 42.1 % (ref 37–48.5)
HCV AB SERPL QL IA: NORMAL
HDLC SERPL-MCNC: 45 MG/DL (ref 40–75)
HDLC SERPL: 22.5 % (ref 20–50)
HGB BLD-MCNC: 14.3 G/DL (ref 12–16)
IMM GRANULOCYTES # BLD AUTO: 0.02 K/UL (ref 0–0.04)
IMM GRANULOCYTES NFR BLD AUTO: 0.3 % (ref 0–0.5)
LDLC SERPL CALC-MCNC: 140.4 MG/DL (ref 63–159)
LYMPHOCYTES # BLD AUTO: 2.7 K/UL (ref 1–4.8)
LYMPHOCYTES NFR BLD: 38.9 % (ref 18–48)
MCH RBC QN AUTO: 30.7 PG (ref 27–31)
MCHC RBC AUTO-ENTMCNC: 34 G/DL (ref 32–36)
MCV RBC AUTO: 90 FL (ref 82–98)
MONOCYTES # BLD AUTO: 0.5 K/UL (ref 0.3–1)
MONOCYTES NFR BLD: 7.2 % (ref 4–15)
NEUTROPHILS # BLD AUTO: 3.6 K/UL (ref 1.8–7.7)
NEUTROPHILS NFR BLD: 51.8 % (ref 38–73)
NONHDLC SERPL-MCNC: 155 MG/DL
NRBC BLD-RTO: 0 /100 WBC
PLATELET # BLD AUTO: 261 K/UL (ref 150–450)
PMV BLD AUTO: 11.5 FL (ref 9.2–12.9)
POTASSIUM SERPL-SCNC: 3.9 MMOL/L (ref 3.5–5.1)
PROT SERPL-MCNC: 8.7 G/DL (ref 6–8.4)
RBC # BLD AUTO: 4.66 M/UL (ref 4–5.4)
RHEUMATOID FACT SERPL-ACNC: <13 IU/ML (ref 0–15)
SODIUM SERPL-SCNC: 139 MMOL/L (ref 136–145)
THYROPEROXIDASE IGG SERPL-ACNC: <6 IU/ML
TRIGL SERPL-MCNC: 73 MG/DL (ref 30–150)
TSH SERPL DL<=0.005 MIU/L-ACNC: 1.29 UIU/ML (ref 0.4–4)
UUN UR-MCNC: 11 MG/DL (ref 7–17)
WBC # BLD AUTO: 6.99 K/UL (ref 3.9–12.7)

## 2024-05-03 PROCEDURE — 36415 COLL VENOUS BLD VENIPUNCTURE: CPT | Mod: PN | Performed by: STUDENT IN AN ORGANIZED HEALTH CARE EDUCATION/TRAINING PROGRAM

## 2024-05-03 PROCEDURE — 80061 LIPID PANEL: CPT | Performed by: STUDENT IN AN ORGANIZED HEALTH CARE EDUCATION/TRAINING PROGRAM

## 2024-05-03 PROCEDURE — 99396 PREV VISIT EST AGE 40-64: CPT | Mod: S$GLB,,, | Performed by: STUDENT IN AN ORGANIZED HEALTH CARE EDUCATION/TRAINING PROGRAM

## 2024-05-03 PROCEDURE — 1160F RVW MEDS BY RX/DR IN RCRD: CPT | Mod: CPTII,S$GLB,, | Performed by: STUDENT IN AN ORGANIZED HEALTH CARE EDUCATION/TRAINING PROGRAM

## 2024-05-03 PROCEDURE — 1159F MED LIST DOCD IN RCRD: CPT | Mod: CPTII,S$GLB,, | Performed by: STUDENT IN AN ORGANIZED HEALTH CARE EDUCATION/TRAINING PROGRAM

## 2024-05-03 PROCEDURE — 86803 HEPATITIS C AB TEST: CPT | Mod: PN | Performed by: STUDENT IN AN ORGANIZED HEALTH CARE EDUCATION/TRAINING PROGRAM

## 2024-05-03 PROCEDURE — 80053 COMPREHEN METABOLIC PANEL: CPT | Mod: PN | Performed by: STUDENT IN AN ORGANIZED HEALTH CARE EDUCATION/TRAINING PROGRAM

## 2024-05-03 PROCEDURE — 86038 ANTINUCLEAR ANTIBODIES: CPT | Mod: PN | Performed by: STUDENT IN AN ORGANIZED HEALTH CARE EDUCATION/TRAINING PROGRAM

## 2024-05-03 PROCEDURE — 83036 HEMOGLOBIN GLYCOSYLATED A1C: CPT | Performed by: STUDENT IN AN ORGANIZED HEALTH CARE EDUCATION/TRAINING PROGRAM

## 2024-05-03 PROCEDURE — 3044F HG A1C LEVEL LT 7.0%: CPT | Mod: CPTII,S$GLB,, | Performed by: STUDENT IN AN ORGANIZED HEALTH CARE EDUCATION/TRAINING PROGRAM

## 2024-05-03 PROCEDURE — 84432 ASSAY OF THYROGLOBULIN: CPT | Mod: PN | Performed by: STUDENT IN AN ORGANIZED HEALTH CARE EDUCATION/TRAINING PROGRAM

## 2024-05-03 PROCEDURE — 86235 NUCLEAR ANTIGEN ANTIBODY: CPT | Mod: PN | Performed by: STUDENT IN AN ORGANIZED HEALTH CARE EDUCATION/TRAINING PROGRAM

## 2024-05-03 PROCEDURE — 85025 COMPLETE CBC W/AUTO DIFF WBC: CPT | Mod: PN | Performed by: STUDENT IN AN ORGANIZED HEALTH CARE EDUCATION/TRAINING PROGRAM

## 2024-05-03 PROCEDURE — 86376 MICROSOMAL ANTIBODY EACH: CPT | Mod: PN | Performed by: STUDENT IN AN ORGANIZED HEALTH CARE EDUCATION/TRAINING PROGRAM

## 2024-05-03 PROCEDURE — 86431 RHEUMATOID FACTOR QUANT: CPT | Mod: PN | Performed by: STUDENT IN AN ORGANIZED HEALTH CARE EDUCATION/TRAINING PROGRAM

## 2024-05-03 PROCEDURE — 3008F BODY MASS INDEX DOCD: CPT | Mod: CPTII,S$GLB,, | Performed by: STUDENT IN AN ORGANIZED HEALTH CARE EDUCATION/TRAINING PROGRAM

## 2024-05-03 PROCEDURE — 3074F SYST BP LT 130 MM HG: CPT | Mod: CPTII,S$GLB,, | Performed by: STUDENT IN AN ORGANIZED HEALTH CARE EDUCATION/TRAINING PROGRAM

## 2024-05-03 PROCEDURE — 85652 RBC SED RATE AUTOMATED: CPT | Mod: PN | Performed by: STUDENT IN AN ORGANIZED HEALTH CARE EDUCATION/TRAINING PROGRAM

## 2024-05-03 PROCEDURE — 84443 ASSAY THYROID STIM HORMONE: CPT | Mod: PN | Performed by: STUDENT IN AN ORGANIZED HEALTH CARE EDUCATION/TRAINING PROGRAM

## 2024-05-03 PROCEDURE — 86200 CCP ANTIBODY: CPT | Mod: PN | Performed by: STUDENT IN AN ORGANIZED HEALTH CARE EDUCATION/TRAINING PROGRAM

## 2024-05-03 PROCEDURE — 4010F ACE/ARB THERAPY RXD/TAKEN: CPT | Mod: CPTII,S$GLB,, | Performed by: STUDENT IN AN ORGANIZED HEALTH CARE EDUCATION/TRAINING PROGRAM

## 2024-05-03 PROCEDURE — 86140 C-REACTIVE PROTEIN: CPT | Mod: PN | Performed by: STUDENT IN AN ORGANIZED HEALTH CARE EDUCATION/TRAINING PROGRAM

## 2024-05-03 PROCEDURE — 3079F DIAST BP 80-89 MM HG: CPT | Mod: CPTII,S$GLB,, | Performed by: STUDENT IN AN ORGANIZED HEALTH CARE EDUCATION/TRAINING PROGRAM

## 2024-05-04 LAB — CCP AB SER IA-ACNC: <0.5 U/ML

## 2024-05-04 NOTE — PROGRESS NOTES
" Patient ID: Anna Oakley is a 53 y.o. female.     Chief Complaint: Annual Exam    HPI   Patient here for annual wellness exam. She complains of circular lesions of hair loss. This is a new problem. She has seen dermatology and recently had a biopsy of the lesions. Otherwise, she has been feeling well. She denies recent chest pain and shortness of breath. She denies fevers and chills.     Review of Systems  Review of Systems   Constitutional:  Negative for fever.   HENT:  Negative for ear pain and sinus pain.    Eyes:  Negative for discharge.   Respiratory:  Negative for cough and shortness of breath.    Cardiovascular:  Negative for chest pain and leg swelling.   Gastrointestinal:  Negative for diarrhea, nausea and vomiting.   Genitourinary:  Negative for urgency.   Musculoskeletal:  Negative for myalgias.   Skin:  Negative for rash.   Neurological:  Negative for weakness and headaches.   Psychiatric/Behavioral:  Negative for depression.    All other systems reviewed and are negative.      Currently Medications  Current Outpatient Medications on File Prior to Visit   Medication Sig Dispense Refill    levothyroxine (SYNTHROID) 88 MCG tablet TAKE 1 TABLET BY MOUTH EVERY DAY 90 tablet 0    losartan (COZAAR) 100 MG tablet TAKE 1 TABLET BY MOUTH EVERY DAY 90 tablet 3    medroxyPROGESTERone (DEPO-PROVERA) 150 mg/mL Syrg INJECT 1ML INTRAMUSCULARLY EVERY 90 DAYS. 1 mL 3    naproxen (NAPROSYN) 500 MG tablet TAKE 1 TABLET BY MOUTH TWICE A DAY 60 tablet 1    cyclobenzaprine (FLEXERIL) 5 MG tablet Take 1-2 tablets (5-10 mg total) by mouth 3 (three) times daily as needed for Muscle spasms. 180 tablet 0     No current facility-administered medications on file prior to visit.       Physical  Exam  Vitals:    05/03/24 1422   BP: 122/86   BP Location: Right arm   Patient Position: Sitting   Weight: 84.5 kg (186 lb 6.4 oz)   Height: 5' 5" (1.651 m)      Body mass index is 31.02 kg/m².  Wt Readings from Last 3 Encounters: "   05/03/24 84.5 kg (186 lb 6.4 oz)   04/23/24 84.4 kg (186 lb)   07/03/23 84.5 kg (186 lb 4.6 oz)       Physical Exam  Vitals and nursing note reviewed.   Constitutional:       General: She is not in acute distress.     Appearance: She is not ill-appearing.   HENT:      Head: Normocephalic and atraumatic.      Right Ear: External ear normal.      Left Ear: External ear normal.      Nose: Nose normal.      Mouth/Throat:      Mouth: Mucous membranes are moist.   Eyes:      Extraocular Movements: Extraocular movements intact.      Conjunctiva/sclera: Conjunctivae normal.   Cardiovascular:      Rate and Rhythm: Normal rate and regular rhythm.      Pulses: Normal pulses.      Heart sounds: No murmur heard.  Pulmonary:      Effort: Pulmonary effort is normal. No respiratory distress.      Breath sounds: No wheezing.   Abdominal:      General: There is no distension.      Palpations: Abdomen is soft. There is no mass.      Tenderness: There is no abdominal tenderness.   Musculoskeletal:         General: No swelling.      Cervical back: Normal range of motion.   Skin:     Coloration: Skin is not jaundiced.      Findings: No rash.   Neurological:      General: No focal deficit present.      Mental Status: She is alert and oriented to person, place, and time.   Psychiatric:         Mood and Affect: Mood normal.         Thought Content: Thought content normal.         Labs:    Complete Blood Count  Lab Results   Component Value Date    RBC 4.66 05/03/2024    HGB 14.3 05/03/2024    HCT 42.1 05/03/2024    MCV 90 05/03/2024    MCH 30.7 05/03/2024    MCHC 34.0 05/03/2024    RDW 12.6 05/03/2024     05/03/2024    MPV 11.5 05/03/2024    GRAN 3.6 05/03/2024    GRAN 51.8 05/03/2024    LYMPH 2.7 05/03/2024    LYMPH 38.9 05/03/2024    MONO 0.5 05/03/2024    MONO 7.2 05/03/2024    EOS 0.1 05/03/2024    BASO 0.05 05/03/2024    EOSINOPHIL 1.1 05/03/2024    BASOPHIL 0.7 05/03/2024    DIFFMETHOD Automated 05/03/2024       Comprehensive  Metabolic Panel  Lab Results   Component Value Date    GLU 89 05/03/2024    BUN 11 05/03/2024    CREATININE 0.77 05/03/2024     05/03/2024    K 3.9 05/03/2024     05/03/2024    PROT 8.7 (H) 05/03/2024    ALBUMIN 5.0 05/03/2024    BILITOT 0.8 05/03/2024    AST 22 05/03/2024    ALKPHOS 80 05/03/2024    CO2 22 (L) 05/03/2024    ALT 18 05/03/2024    ANIONGAP 13 05/03/2024       TSH  Lab Results   Component Value Date    TSH 1.290 05/03/2024       Imaging:  Mammo Digital Screening Bilat w/ Thomas  Narrative: Result:  Mammo Digital Screening Bilat w/ Thomas    History:  Patient is 53 y.o. and is seen for a screening mammogram.    Films Compared:  Compared to: 04/10/2023 Mammo Digital Screening Bilat w/ Thomas, 04/07/2022   Mammo Digital Screening Bilat w/ Thomas, and 03/01/2021 Mammo Digital   Screening Bilat w/ Thomas     Findings:  This procedure was performed using tomosynthesis.   Computer-aided detection was utilized in the interpretation of this   examination.    The breasts are heterogeneously dense, which may obscure small masses.   There is no evidence of suspicious masses, microcalcifications or   architectural distortion.  Impression:    No mammographic evidence of malignancy.    BI-RADS Category 1: Negative    Recommendation:  Routine screening mammogram in 1 year is recommended.    Your estimated lifetime risk of breast cancer (to age 85) based on   Tyrer-Cuzick risk assessment model is 19.12%.  According to the American   Cancer Society, patients with a lifetime breast cancer risk of 20% or   higher might benefit from supplemental screening tests, such as screening   breast MRI.      Assessment/Plan:    1. Hypothyroidism, unspecified type  -     TSH; Future; Expected date: 05/03/2024  -     THYROGLOBULIN; Future; Expected date: 05/03/2024    2. Essential hypertension  -     CBC Auto Differential; Future  -     Comprehensive metabolic panel; Future; Expected date: 05/03/2024    3. Hyperglycemia  -      HEMOGLOBIN A1C; Future; Expected date: 05/03/2024    4. Screening for cardiovascular condition  -     LIPID PANEL; Future; Expected date: 05/03/2024    5. Hair loss  -     Sedimentation rate; Future; Expected date: 05/03/2024  -     C-reactive protein; Future; Expected date: 05/03/2024  -     NEVAEH; Future; Expected date: 05/03/2024  -     Rheumatoid factor; Future; Expected date: 05/03/2024  -     Cyclic citrul peptide antibody, IgG; Future; Expected date: 05/03/2024  -     Sjogrens syndrome-A extractable nuclear antibody; Future; Expected date: 05/03/2024  -     Hepatitis C antibody; Future; Expected date: 05/03/2024  -     THYROID PEROXIDASE ANTIBODY; Future; Expected date: 05/03/2024         Discussed how to stay healthy including: diet, exercise, refraining from smoking and discussed screening exams / tests needed for age, sex and family Hx.        Bharat Mcpherson MD

## 2024-05-06 LAB
ANA SER QL IF: NORMAL
ANTI-SSA ANTIBODY: 0.1 RATIO (ref 0–0.99)
ANTI-SSA INTERPRETATION: NEGATIVE
THRYOGLOBULIN INTERPRETATION: ABNORMAL
THYROGLOB AB SERPL-ACNC: <1.8 IU/ML
THYROGLOB SERPL-MCNC: 38 NG/ML

## 2024-06-03 DIAGNOSIS — E03.9 HYPOTHYROIDISM, UNSPECIFIED TYPE: ICD-10-CM

## 2024-06-03 RX ORDER — LEVOTHYROXINE SODIUM 88 UG/1
TABLET ORAL
Qty: 90 TABLET | Refills: 3 | Status: SHIPPED | OUTPATIENT
Start: 2024-06-03

## 2024-06-03 NOTE — TELEPHONE ENCOUNTER
No care due was identified.  [facility] Embedded Care Due Messages. Reference number: [id number].   6/03/2024 12:17:17 AM CDT

## 2024-06-03 NOTE — TELEPHONE ENCOUNTER
Refill Decision Note   Anna Oakley  is requesting a refill authorization.  Brief Assessment and Rationale for Refill:  Approve     Medication Therapy Plan:  MRM resolved      Comments:     Note composed:6:55 AM 06/03/2024

## 2024-06-12 ENCOUNTER — PATIENT MESSAGE (OUTPATIENT)
Dept: FAMILY MEDICINE | Facility: CLINIC | Age: 54
End: 2024-06-12
Payer: COMMERCIAL

## 2024-06-12 DIAGNOSIS — I51.7 LVH (LEFT VENTRICULAR HYPERTROPHY): ICD-10-CM

## 2024-06-12 DIAGNOSIS — I10 ESSENTIAL HYPERTENSION: ICD-10-CM

## 2024-06-12 RX ORDER — LOSARTAN POTASSIUM 50 MG/1
50 TABLET ORAL DAILY
Qty: 90 TABLET | Refills: 3 | Status: SHIPPED | OUTPATIENT
Start: 2024-06-12

## 2024-07-03 ENCOUNTER — OFFICE VISIT (OUTPATIENT)
Dept: OBSTETRICS AND GYNECOLOGY | Facility: CLINIC | Age: 54
End: 2024-07-03
Payer: COMMERCIAL

## 2024-07-03 VITALS
BODY MASS INDEX: 31.07 KG/M2 | DIASTOLIC BLOOD PRESSURE: 72 MMHG | HEIGHT: 65 IN | WEIGHT: 186.5 LBS | SYSTOLIC BLOOD PRESSURE: 122 MMHG

## 2024-07-03 DIAGNOSIS — Z11.51 SCREENING FOR HPV (HUMAN PAPILLOMAVIRUS): ICD-10-CM

## 2024-07-03 DIAGNOSIS — Z12.4 ENCOUNTER FOR PAPANICOLAOU SMEAR FOR CERVICAL CANCER SCREENING: ICD-10-CM

## 2024-07-03 DIAGNOSIS — Z30.9 ENCOUNTER FOR CONTRACEPTIVE MANAGEMENT, UNSPECIFIED TYPE: ICD-10-CM

## 2024-07-03 DIAGNOSIS — Z01.419 WOMEN'S ANNUAL ROUTINE GYNECOLOGICAL EXAMINATION: Primary | ICD-10-CM

## 2024-07-03 DIAGNOSIS — Z12.31 ENCOUNTER FOR SCREENING MAMMOGRAM FOR BREAST CANCER: ICD-10-CM

## 2024-07-03 PROCEDURE — 99999 PR PBB SHADOW E&M-EST. PATIENT-LVL III: CPT | Mod: PBBFAC,,, | Performed by: NURSE PRACTITIONER

## 2024-07-03 PROCEDURE — 99396 PREV VISIT EST AGE 40-64: CPT | Mod: S$GLB,,, | Performed by: NURSE PRACTITIONER

## 2024-07-03 PROCEDURE — 3008F BODY MASS INDEX DOCD: CPT | Mod: CPTII,S$GLB,, | Performed by: NURSE PRACTITIONER

## 2024-07-03 PROCEDURE — 4010F ACE/ARB THERAPY RXD/TAKEN: CPT | Mod: CPTII,S$GLB,, | Performed by: NURSE PRACTITIONER

## 2024-07-03 PROCEDURE — 3078F DIAST BP <80 MM HG: CPT | Mod: CPTII,S$GLB,, | Performed by: NURSE PRACTITIONER

## 2024-07-03 PROCEDURE — 3074F SYST BP LT 130 MM HG: CPT | Mod: CPTII,S$GLB,, | Performed by: NURSE PRACTITIONER

## 2024-07-03 PROCEDURE — 1159F MED LIST DOCD IN RCRD: CPT | Mod: CPTII,S$GLB,, | Performed by: NURSE PRACTITIONER

## 2024-07-03 PROCEDURE — 3044F HG A1C LEVEL LT 7.0%: CPT | Mod: CPTII,S$GLB,, | Performed by: NURSE PRACTITIONER

## 2024-07-03 RX ORDER — MEDROXYPROGESTERONE ACETATE 150 MG/ML
150 INJECTION, SUSPENSION INTRAMUSCULAR
Qty: 1 ML | Refills: 3 | Status: SHIPPED | OUTPATIENT
Start: 2024-07-03 | End: 2024-07-07
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
## 2025-03-19 ENCOUNTER — OFFICE VISIT (OUTPATIENT)
Dept: FAMILY MEDICINE | Facility: CLINIC | Age: 55
End: 2025-03-19
Payer: COMMERCIAL

## 2025-03-19 VITALS
BODY MASS INDEX: 31.75 KG/M2 | OXYGEN SATURATION: 97 % | HEIGHT: 65 IN | SYSTOLIC BLOOD PRESSURE: 132 MMHG | HEART RATE: 80 BPM | WEIGHT: 190.56 LBS | TEMPERATURE: 98 F | DIASTOLIC BLOOD PRESSURE: 84 MMHG

## 2025-03-19 DIAGNOSIS — E03.9 HYPOTHYROIDISM, UNSPECIFIED TYPE: ICD-10-CM

## 2025-03-19 DIAGNOSIS — R73.9 HYPERGLYCEMIA: ICD-10-CM

## 2025-03-19 DIAGNOSIS — Z13.6 SCREENING FOR CARDIOVASCULAR CONDITION: ICD-10-CM

## 2025-03-19 DIAGNOSIS — Z00.00 WELLNESS EXAMINATION: Primary | ICD-10-CM

## 2025-03-19 PROCEDURE — 3079F DIAST BP 80-89 MM HG: CPT | Mod: CPTII,S$GLB,, | Performed by: STUDENT IN AN ORGANIZED HEALTH CARE EDUCATION/TRAINING PROGRAM

## 2025-03-19 PROCEDURE — 99396 PREV VISIT EST AGE 40-64: CPT | Mod: S$GLB,,, | Performed by: STUDENT IN AN ORGANIZED HEALTH CARE EDUCATION/TRAINING PROGRAM

## 2025-03-19 PROCEDURE — 1160F RVW MEDS BY RX/DR IN RCRD: CPT | Mod: CPTII,S$GLB,, | Performed by: STUDENT IN AN ORGANIZED HEALTH CARE EDUCATION/TRAINING PROGRAM

## 2025-03-19 PROCEDURE — 3008F BODY MASS INDEX DOCD: CPT | Mod: CPTII,S$GLB,, | Performed by: STUDENT IN AN ORGANIZED HEALTH CARE EDUCATION/TRAINING PROGRAM

## 2025-03-19 PROCEDURE — 1159F MED LIST DOCD IN RCRD: CPT | Mod: CPTII,S$GLB,, | Performed by: STUDENT IN AN ORGANIZED HEALTH CARE EDUCATION/TRAINING PROGRAM

## 2025-03-19 PROCEDURE — 4010F ACE/ARB THERAPY RXD/TAKEN: CPT | Mod: CPTII,S$GLB,, | Performed by: STUDENT IN AN ORGANIZED HEALTH CARE EDUCATION/TRAINING PROGRAM

## 2025-03-19 PROCEDURE — 3075F SYST BP GE 130 - 139MM HG: CPT | Mod: CPTII,S$GLB,, | Performed by: STUDENT IN AN ORGANIZED HEALTH CARE EDUCATION/TRAINING PROGRAM

## 2025-03-19 RX ORDER — LEVOTHYROXINE SODIUM 88 UG/1
88 TABLET ORAL DAILY
Qty: 90 TABLET | Refills: 3 | Status: SHIPPED | OUTPATIENT
Start: 2025-03-19

## 2025-03-19 NOTE — PROGRESS NOTES
" Patient ID: Anna Oakley is a 54 y.o. female.     Chief Complaint: Annual Exam    HPI  History of Present Illness    Anna presents today for evaluation of an itchy mole    She reports a bothersome mole with persistent pruritus and discomfort due to friction with clothing.    She is currently taking Synthroid (levothyroxine) and needs a refill. Her prescription was sent to [facility] pharmacy.    Her last labs was performed approximately one year ago.       She denies recent chest pain, shortness of breath, abdominal pain, fevers, and chills. She reports normal bowel movements.     Review of Systems  Review of Systems   Constitutional:  Negative for fever.   HENT:  Negative for ear pain, hearing loss and sinus pain.    Eyes:  Negative for discharge.   Respiratory:  Negative for cough, shortness of breath and wheezing.    Cardiovascular:  Negative for chest pain, palpitations and leg swelling.   Gastrointestinal:  Negative for blood in stool, constipation, diarrhea, nausea and vomiting.   Genitourinary:  Negative for dysuria, hematuria and urgency.   Musculoskeletal:  Negative for myalgias and neck pain.   Skin:  Negative for rash.   Neurological:  Negative for weakness and headaches.   Endo/Heme/Allergies:  Negative for polydipsia.   Psychiatric/Behavioral:  Negative for depression.    All other systems reviewed and are negative.      Currently Medications  Medications Ordered Prior to Encounter[1]    Physical  Exam  Vitals:    03/19/25 1602   BP: 132/84   BP Location: Left arm   Patient Position: Sitting   Pulse: 80   Temp: 98 °F (36.7 °C)   TempSrc: Oral   SpO2: 97%   Weight: 86.4 kg (190 lb 9.4 oz)   Height: 5' 5" (1.651 m)      Body mass index is 31.72 kg/m².  Wt Readings from Last 3 Encounters:   03/19/25 86.4 kg (190 lb 9.4 oz)   07/03/24 84.6 kg (186 lb 8.2 oz)   05/03/24 84.5 kg (186 lb 6.4 oz)       Physical Exam  Vitals and nursing note reviewed.   Constitutional:       General: She is not in acute " "distress.     Appearance: She is not ill-appearing.   HENT:      Head: Normocephalic and atraumatic.      Right Ear: External ear normal.      Left Ear: External ear normal.      Nose: Nose normal.      Mouth/Throat:      Mouth: Mucous membranes are moist.   Eyes:      Extraocular Movements: Extraocular movements intact.      Conjunctiva/sclera: Conjunctivae normal.   Cardiovascular:      Rate and Rhythm: Normal rate and regular rhythm.      Pulses: Normal pulses.      Heart sounds: No murmur heard.  Pulmonary:      Effort: Pulmonary effort is normal. No respiratory distress.      Breath sounds: No wheezing.   Abdominal:      General: There is no distension.      Palpations: Abdomen is soft. There is no mass.      Tenderness: There is no abdominal tenderness.   Musculoskeletal:         General: No swelling.      Cervical back: Normal range of motion.   Skin:     Coloration: Skin is not jaundiced.      Findings: No rash.   Neurological:      General: No focal deficit present.      Mental Status: She is alert and oriented to person, place, and time.   Psychiatric:         Mood and Affect: Mood normal.         Thought Content: Thought content normal.         Labs:    Complete Blood Count  Lab Results   Component Value Date    RBC 4.66 05/03/2024    HGB 14.3 05/03/2024    HCT 42.1 05/03/2024    MCV 90 05/03/2024    MCH 30.7 05/03/2024    MCHC 34.0 05/03/2024    RDW 12.6 05/03/2024     05/03/2024    MPV 11.5 05/03/2024    GRAN 3.6 05/03/2024    GRAN 51.8 05/03/2024    LYMPH 2.7 05/03/2024    LYMPH 38.9 05/03/2024    MONO 0.5 05/03/2024    MONO 7.2 05/03/2024    EOS 0.1 05/03/2024    BASO 0.05 05/03/2024    EOSINOPHIL 1.1 05/03/2024    BASOPHIL 0.7 05/03/2024    DIFFMETHOD Automated 05/03/2024       Comprehensive Metabolic Panel  No results found for: "GLU", "BUN", "CREATININE", "NA", "K", "CL", "PROT", "ALBUMIN", "BILITOT", "AST", "ALKPHOS", "CO2", "ALT", "ANIONGAP", "EGFRNONAA", "ESTGFRAFRICA"    TSH  No results " "found for: "TSH"    Imaging:  Mammo Digital Screening Bilat w/ Thomas  Narrative: Result:  Mammo Digital Screening Bilat w/ Thomas    History:  Patient is 53 y.o. and is seen for a screening mammogram.    Films Compared:  Compared to: 04/10/2023 Mammo Digital Screening Bilat w/ Thomas, 04/07/2022   Mammo Digital Screening Bilat w/ Thomas, and 03/01/2021 Mammo Digital   Screening Bilat w/ Thomas     Findings:  This procedure was performed using tomosynthesis.   Computer-aided detection was utilized in the interpretation of this   examination.    The breasts are heterogeneously dense, which may obscure small masses.   There is no evidence of suspicious masses, microcalcifications or   architectural distortion.  Impression:    No mammographic evidence of malignancy.    BI-RADS Category 1: Negative    Recommendation:  Routine screening mammogram in 1 year is recommended.    Your estimated lifetime risk of breast cancer (to age 85) based on   Tyrer-Cuzick risk assessment model is 19.12%.  According to the American   Cancer Society, patients with a lifetime breast cancer risk of 20% or   higher might benefit from supplemental screening tests, such as screening   breast MRI.      Assessment/Plan:  Assessment & Plan    Assessed mole on chest causing irritation and itching.  Recommend cryotherapy for mole removal. Explained potential side effects, including possible scabbing and incomplete removal. Informed patient about the need for repeat treatment if mole does not completely come off after initial cryotherapy.  Noted patient is due for annual thyroid function test and mammogram.  Performed brief physical exam.    MELANOCYTIC NEVI:  - Examined the patient's mole that is causing irritation and itching.  - Determined the mole can be treated with cryotherapy.  - Discussed the cryotherapy procedure and potential outcomes with the patient.  - Added the patient to a call-back list for cryotherapy treatment when supplies are " restocked.    PRURITUS:  - Acknowledged the patient's report of itching associated with the mole.  - Planned to address the pruritus by removing the source through cryotherapy of the mole.    HYPOTHYROIDISM:  - Refilled Levothyroxine (Synthroid) prescription and sent it to [facility] pharmacy.  - Ordered thyroid function test as part of annual routine labs.  - Informed the patient about the availability of walk-in labs.  - Noted that the patient's last labs was performed nearly 1 year ago.    GENERAL CARE PLAN:  - Ordered mammogram, due April 23rd.  - Contact the office to complete lab orders, which are valid for a year and can be completed at patient's convenience without an appointment.         1. Wellness examination    2. Hypothyroidism, unspecified type  -     CBC Auto Differential; Future  -     Comprehensive Metabolic Panel; Future; Expected date: 03/19/2025  -     TSH; Future; Expected date: 03/19/2025    3. Hyperglycemia  -     Hemoglobin A1C; Future; Expected date: 03/19/2025    4. Screening for cardiovascular condition  -     Lipid Panel; Future; Expected date: 03/19/2025    Other orders  -     levothyroxine (SYNTHROID) 88 MCG tablet; Take 1 tablet (88 mcg total) by mouth once daily.  Dispense: 90 tablet; Refill: 3         Discussed how to stay healthy including: diet, exercise, refraining from smoking and discussed screening exams / tests needed for age, sex and family Hx.        Bharat Mcpherson MD    This note was generated with the assistance of ambient listening technology. Verbal consent was obtained by the patient and accompanying visitor(s) for the recording of patient appointment to facilitate this note. I attest to having reviewed and edited the generated note for accuracy, though some syntax or spelling errors may persist. Please contact the author of this note for any clarification.      Answers submitted by the patient for this visit:  Review of Systems Questionnaire (Submitted on 3/19/2025)  activity  change: No  unexpected weight change: No  rhinorrhea: No  trouble swallowing: No  visual disturbance: No  chest tightness: No  polyuria: No  difficulty urinating: No  menstrual problem: No  joint swelling: No  arthralgias: No  confusion: No  dysphoric mood: No         [1]   Current Outpatient Medications on File Prior to Visit   Medication Sig Dispense Refill    losartan (COZAAR) 50 MG tablet Take 1 tablet (50 mg total) by mouth once daily. 90 tablet 3    medroxyPROGESTERone (DEPO-PROVERA) 150 mg/mL Syrg INJECT 1ML INTRAMUSCULARLY EVERY 90 DAYS. 1 mL 3    [DISCONTINUED] levothyroxine (SYNTHROID) 88 MCG tablet TAKE 1 TABLET BY MOUTH EVERY DAY 90 tablet 3    cyclobenzaprine (FLEXERIL) 5 MG tablet Take 1-2 tablets (5-10 mg total) by mouth 3 (three) times daily as needed for Muscle spasms. (Patient not taking: Reported on 3/19/2025) 180 tablet 0    naproxen (NAPROSYN) 500 MG tablet TAKE 1 TABLET BY MOUTH TWICE A DAY (Patient not taking: Reported on 3/19/2025) 60 tablet 1     No current facility-administered medications on file prior to visit.

## 2025-03-20 ENCOUNTER — TELEPHONE (OUTPATIENT)
Dept: FAMILY MEDICINE | Facility: CLINIC | Age: 55
End: 2025-03-20
Payer: COMMERCIAL

## 2025-03-20 NOTE — TELEPHONE ENCOUNTER
Please contact pt once Liquid Nitrogen is back in stock.    Schedule pt to see Dr. Mcpherson once back in stock.   Per Dr. Mcpherson

## 2025-03-25 ENCOUNTER — LAB VISIT (OUTPATIENT)
Dept: LAB | Facility: HOSPITAL | Age: 55
End: 2025-03-25
Attending: STUDENT IN AN ORGANIZED HEALTH CARE EDUCATION/TRAINING PROGRAM
Payer: COMMERCIAL

## 2025-03-25 DIAGNOSIS — Z13.6 SCREENING FOR CARDIOVASCULAR CONDITION: ICD-10-CM

## 2025-03-25 DIAGNOSIS — E03.9 HYPOTHYROIDISM, UNSPECIFIED TYPE: ICD-10-CM

## 2025-03-25 DIAGNOSIS — R73.9 HYPERGLYCEMIA: ICD-10-CM

## 2025-03-25 LAB
ABSOLUTE EOSINOPHIL (OHS): 0.05 K/UL
ABSOLUTE MONOCYTE (OHS): 0.33 K/UL (ref 0.3–1)
ABSOLUTE NEUTROPHIL COUNT (OHS): 2.66 K/UL (ref 1.8–7.7)
ALBUMIN SERPL BCP-MCNC: 4.6 G/DL (ref 3.5–5.2)
ALP SERPL-CCNC: 77 UNIT/L (ref 38–126)
ALT SERPL W/O P-5'-P-CCNC: 19 UNIT/L (ref 10–44)
ANION GAP (OHS): 12 MMOL/L (ref 8–16)
AST SERPL-CCNC: 28 UNIT/L (ref 15–46)
BASOPHILS # BLD AUTO: 0.03 K/UL
BASOPHILS NFR BLD AUTO: 0.6 %
BILIRUB SERPL-MCNC: 1.1 MG/DL (ref 0.1–1)
BUN SERPL-MCNC: 8 MG/DL (ref 7–17)
CALCIUM SERPL-MCNC: 9.8 MG/DL (ref 8.7–10.5)
CHLORIDE SERPL-SCNC: 101 MMOL/L (ref 95–110)
CO2 SERPL-SCNC: 24 MMOL/L (ref 23–29)
CREAT SERPL-MCNC: 0.7 MG/DL (ref 0.5–1.4)
ERYTHROCYTE [DISTWIDTH] IN BLOOD BY AUTOMATED COUNT: 12.7 % (ref 11.5–14.5)
GFR SERPLBLD CREATININE-BSD FMLA CKD-EPI: >60 ML/MIN/1.73/M2
GLUCOSE SERPL-MCNC: 83 MG/DL (ref 70–110)
HCT VFR BLD AUTO: 39.2 % (ref 37–48.5)
HGB BLD-MCNC: 13.3 GM/DL (ref 12–16)
IMM GRANULOCYTES # BLD AUTO: 0.01 K/UL (ref 0–0.04)
IMM GRANULOCYTES NFR BLD AUTO: 0.2 % (ref 0–0.5)
LYMPHOCYTES # BLD AUTO: 2.22 K/UL (ref 1–4.8)
MCH RBC QN AUTO: 30.5 PG (ref 27–50)
MCHC RBC AUTO-ENTMCNC: 33.9 G/DL (ref 32–36)
MCV RBC AUTO: 90 FL (ref 82–98)
NUCLEATED RBC (/100WBC) (OHS): 0 /100 WBC
PLATELET # BLD AUTO: 386 K/UL (ref 150–450)
PMV BLD AUTO: 11.4 FL (ref 9.2–12.9)
POTASSIUM SERPL-SCNC: 4.1 MMOL/L (ref 3.5–5.1)
PROT SERPL-MCNC: 7.6 GM/DL (ref 6–8.4)
RBC # BLD AUTO: 4.36 M/UL (ref 4–5.4)
RELATIVE EOSINOPHIL (OHS): 0.9 %
RELATIVE LYMPHOCYTE (OHS): 41.9 % (ref 18–48)
RELATIVE MONOCYTE (OHS): 6.2 % (ref 4–15)
RELATIVE NEUTROPHIL (OHS): 50.2 % (ref 38–73)
SODIUM SERPL-SCNC: 137 MMOL/L (ref 136–145)
TSH SERPL-ACNC: 0.56 UIU/ML (ref 0.4–4)
WBC # BLD AUTO: 5.3 K/UL (ref 3.9–12.7)

## 2025-03-25 PROCEDURE — 82040 ASSAY OF SERUM ALBUMIN: CPT | Mod: PN

## 2025-03-25 PROCEDURE — 85025 COMPLETE CBC W/AUTO DIFF WBC: CPT | Mod: PN

## 2025-03-25 PROCEDURE — 83036 HEMOGLOBIN GLYCOSYLATED A1C: CPT | Mod: PN

## 2025-03-25 PROCEDURE — 84443 ASSAY THYROID STIM HORMONE: CPT | Mod: PN

## 2025-03-25 PROCEDURE — 36415 COLL VENOUS BLD VENIPUNCTURE: CPT | Mod: PN

## 2025-03-25 PROCEDURE — 80061 LIPID PANEL: CPT | Mod: PN

## 2025-03-27 LAB
CHOLEST SERPL-MCNC: 191 MG/DL (ref 120–199)
CHOLEST/HDLC SERPL: 3.6 {RATIO} (ref 2–5)
EAG (OHS): 111 MG/DL (ref 68–131)
HBA1C MFR BLD: 5.5 % (ref 4–5.6)
HDLC SERPL-MCNC: 53 MG/DL (ref 40–75)
HDLC SERPL: 27.7 % (ref 20–50)
LDLC SERPL CALC-MCNC: 126.6 MG/DL (ref 63–159)
NONHDLC SERPL-MCNC: 138 MG/DL
TRIGL SERPL-MCNC: 57 MG/DL (ref 30–150)

## 2025-03-31 ENCOUNTER — TELEPHONE (OUTPATIENT)
Dept: FAMILY MEDICINE | Facility: CLINIC | Age: 55
End: 2025-03-31
Payer: COMMERCIAL

## 2025-03-31 ENCOUNTER — RESULTS FOLLOW-UP (OUTPATIENT)
Dept: FAMILY MEDICINE | Facility: CLINIC | Age: 55
End: 2025-03-31

## 2025-03-31 NOTE — TELEPHONE ENCOUNTER
----- Message from Aby sent at 3/31/2025  9:40 AM CDT -----  Type:  Needs Medical AdviceWho Called: Pt Symptoms (please be specific): status of form for wellness  Would the patient rather a call back or a response via [facility]? Call back Best Call Back Number: [phone] Information: Please be advised, pt states that she has been calling in since last week stating that she needed document to be sent over on portal before 03/31 which is the deadline to turn it in, pt would like a call back as soon as possible

## 2025-03-31 NOTE — TELEPHONE ENCOUNTER
----- Message from Patricia sent at 3/31/2025 10:13 AM CDT -----  Type:  Patient  CallWho Called:Pt Would the patient rather a call back or a response via [facility]? Call back Best Call Back Number: [phone] Information: did not receive her fax ...please refax to  [phone]... please call when faxed   or send to her portal

## 2025-04-02 ENCOUNTER — OFFICE VISIT (OUTPATIENT)
Dept: URGENT CARE | Facility: URGENT CARE | Age: 55
End: 2025-04-02
Payer: COMMERCIAL

## 2025-04-02 VITALS
BODY MASS INDEX: 37.89 KG/M2 | RESPIRATION RATE: 18 BRPM | HEART RATE: 71 BPM | WEIGHT: 250 LBS | HEIGHT: 68 IN | OXYGEN SATURATION: 97 % | TEMPERATURE: 97.2 F | DIASTOLIC BLOOD PRESSURE: 100 MMHG | SYSTOLIC BLOOD PRESSURE: 156 MMHG

## 2025-04-02 DIAGNOSIS — I10 HYPERTENSION, UNSPECIFIED TYPE: ICD-10-CM

## 2025-04-02 DIAGNOSIS — R51.9 NONINTRACTABLE HEADACHE, UNSPECIFIED CHRONICITY PATTERN, UNSPECIFIED HEADACHE TYPE: Primary | ICD-10-CM

## 2025-04-02 PROCEDURE — 3077F SYST BP >= 140 MM HG: CPT | Performed by: PHYSICIAN ASSISTANT

## 2025-04-02 PROCEDURE — 3080F DIAST BP >= 90 MM HG: CPT | Performed by: PHYSICIAN ASSISTANT

## 2025-04-02 PROCEDURE — 99204 OFFICE O/P NEW MOD 45 MIN: CPT | Performed by: PHYSICIAN ASSISTANT

## 2025-04-02 RX ORDER — OLMESARTAN MEDOXOMIL 20 MG/1
TABLET ORAL DAILY
COMMUNITY

## 2025-04-02 NOTE — PROGRESS NOTES
Urgent Care Clinic Visit    Chief Complaint   Patient presents with    Urgent Care     BP at home 195/105, 167/101. Also having headache. Switched to new bp med last night and hadn't taken her bp meds in a week.               4/2/2025     3:09 PM   Additional Questions   Roomed by Jodie Scott MA

## 2025-04-02 NOTE — PATIENT INSTRUCTIONS
Please go to the ER for further evaluation and management of your severe HA with elevated BP which is outside scope of urgent care evaluation.

## 2025-04-02 NOTE — PROGRESS NOTES
Assessment & Plan     Nonintractable headache, unspecified chronicity pattern, unspecified headache type  Pt seen for HA and htn, has been off medication for 1 week.  BP here is 156/100 which I explained is not worrisome to patient however she has had BP in to 190s SBP at home.  Given severity of HA, I recommend she be evaluate in the ED to exclude hypertensive urgency / emergency as well as treatment of her HA as we have limited options in urgent care especially in light of not recent renal function and elevated BP .      Hypertension, unspecified type  As above            FARIBA Bello [facility] [place]    Amelia Lyman is a 54 year old female who presents to clinic today for the following health issues:  Chief Complaint   Patient presents with    Urgent Care     BP at home 195/105, 167/101. Also having headache. Switched to new bp med last night and hadn't taken her bp meds in a week.          4/2/2025     3:09 PM   Additional Questions   Roomed by Jodie Scott MA     HPI  Pt is a 54 year old female with hx of htn, dyslipidemia who presents to urgent care with concern re : significant HA.    Off medication for BP x 1 week.    Was seen at a medical clinic for back yesterday.    Stopped hydrochlorothiazide and potassium.    Started amlodipine and  B vitamin.    Small HA this morning.    Olmesartan started yesterday.    ([facility] clinic).    Planned physical for follow up.    PCP : Joel Smith at .    Headache:  not worst HA of life but moderately severe  8/10 currently.    Not thunder clap but mounted over 30-45 min          Review of Systems  Constitutional, HEENT, cardiovascular, pulmonary, gi and gu systems are negative, except as otherwise noted.    Patient Active Problem List   Diagnosis    Atypical Chest Pain    Hypertension    Obesity (BMI 30.0-34.9)    Hypertension    Uterine fibroid    S/P hysterectomy    Adjustment disorder with anxiety  "   Hypercholesterolemia    Hypokalemia    Postoperative intra-abdominal abscess (H)    Sepsis (H)           Objective    BP (!) 156/100   Pulse 71   Temp 97.2  F (36.2  C) (Temporal)   Resp 18   Ht 1.727 m (5' 8\")   Wt 113.4 kg (250 lb)   SpO2 97%   BMI 38.01 kg/m    Physical Exam   Nad appears well but uncomfortable holding head.    Lungs CTA  Heart RRR  No results found for any visits on 04/02/25.          "

## 2025-04-07 ENCOUNTER — OFFICE VISIT (OUTPATIENT)
Dept: FAMILY MEDICINE | Facility: CLINIC | Age: 55
End: 2025-04-07
Payer: COMMERCIAL

## 2025-04-07 VITALS
OXYGEN SATURATION: 95 % | TEMPERATURE: 98 F | HEART RATE: 71 BPM | BODY MASS INDEX: 31.86 KG/M2 | HEIGHT: 65 IN | SYSTOLIC BLOOD PRESSURE: 132 MMHG | DIASTOLIC BLOOD PRESSURE: 70 MMHG | WEIGHT: 191.25 LBS

## 2025-04-07 DIAGNOSIS — Z12.31 ENCOUNTER FOR SCREENING MAMMOGRAM FOR BREAST CANCER: ICD-10-CM

## 2025-04-07 DIAGNOSIS — L91.8 SKIN TAG: Primary | ICD-10-CM

## 2025-04-07 PROCEDURE — 3078F DIAST BP <80 MM HG: CPT | Mod: CPTII,S$GLB,, | Performed by: STUDENT IN AN ORGANIZED HEALTH CARE EDUCATION/TRAINING PROGRAM

## 2025-04-07 PROCEDURE — 17110 DESTRUCTION B9 LES UP TO 14: CPT | Mod: S$GLB,,, | Performed by: STUDENT IN AN ORGANIZED HEALTH CARE EDUCATION/TRAINING PROGRAM

## 2025-04-07 PROCEDURE — 1160F RVW MEDS BY RX/DR IN RCRD: CPT | Mod: CPTII,S$GLB,, | Performed by: STUDENT IN AN ORGANIZED HEALTH CARE EDUCATION/TRAINING PROGRAM

## 2025-04-07 PROCEDURE — 99214 OFFICE O/P EST MOD 30 MIN: CPT | Mod: 25,S$GLB,, | Performed by: STUDENT IN AN ORGANIZED HEALTH CARE EDUCATION/TRAINING PROGRAM

## 2025-04-07 PROCEDURE — 1159F MED LIST DOCD IN RCRD: CPT | Mod: CPTII,S$GLB,, | Performed by: STUDENT IN AN ORGANIZED HEALTH CARE EDUCATION/TRAINING PROGRAM

## 2025-04-07 PROCEDURE — 3008F BODY MASS INDEX DOCD: CPT | Mod: CPTII,S$GLB,, | Performed by: STUDENT IN AN ORGANIZED HEALTH CARE EDUCATION/TRAINING PROGRAM

## 2025-04-07 PROCEDURE — 3044F HG A1C LEVEL LT 7.0%: CPT | Mod: CPTII,S$GLB,, | Performed by: STUDENT IN AN ORGANIZED HEALTH CARE EDUCATION/TRAINING PROGRAM

## 2025-04-07 PROCEDURE — 3075F SYST BP GE 130 - 139MM HG: CPT | Mod: CPTII,S$GLB,, | Performed by: STUDENT IN AN ORGANIZED HEALTH CARE EDUCATION/TRAINING PROGRAM

## 2025-04-07 PROCEDURE — 4010F ACE/ARB THERAPY RXD/TAKEN: CPT | Mod: CPTII,S$GLB,, | Performed by: STUDENT IN AN ORGANIZED HEALTH CARE EDUCATION/TRAINING PROGRAM

## 2025-04-07 NOTE — PROCEDURES
Destruction, Benign Lesion    Date/Time: 4/7/2025 3:40 PM    Performed by: Bharat Mcpherson MD  Authorized by: Bahrat Mcpherson MD    Consent Done?:  Yes (Verbal)  Pre-Procedure:     Timeout: prior to procedure the correct patient, procedure, and site was verified      Local anesthesia used?: No    Indications:     Destruction Indications: skin tag.  Location:     Trunk location: left axilla.  Prep:     Preparation: Patient was prepped and draped in usual sterile fashion    Procedure Details:     Cosmetic?: No      Number of lesions:  1    Destruction method:  Cryotherapy    Bleeding:  None     Patient tolerated the procedure well with no immediate complications.

## 2025-04-08 NOTE — PROGRESS NOTES
" Patient ID: Anna Oakley is a 54 y.o. female.     Chief Complaint: mole removal    HPI  Patient here for removal of mole in right axilla. She reports the mole is itching and bothersome. She denies changes to the mole recently.     Review of Systems  Review of Systems   Constitutional:  Negative for fever.   HENT:  Negative for ear pain and sinus pain.    Eyes:  Negative for discharge.   Respiratory:  Negative for cough and shortness of breath.    Cardiovascular:  Negative for chest pain and leg swelling.   Gastrointestinal:  Negative for diarrhea, nausea and vomiting.   Genitourinary:  Negative for urgency.   Musculoskeletal:  Negative for myalgias.   Skin:  Negative for rash.   Neurological:  Negative for weakness and headaches.   Psychiatric/Behavioral:  Negative for depression.    All other systems reviewed and are negative.      Currently Medications  Medications Ordered Prior to Encounter[1]    Physical  Exam  Vitals:    04/07/25 1622   BP: 132/70   BP Location: Right arm   Patient Position: Sitting   Pulse: 71   Temp: 98.1 °F (36.7 °C)   TempSrc: Oral   SpO2: 95%   Weight: 86.7 kg (191 lb 4 oz)   Height: 5' 5" (1.651 m)      Body mass index is 31.83 kg/m².  Wt Readings from Last 3 Encounters:   04/07/25 86.7 kg (191 lb 4 oz)   03/19/25 86.4 kg (190 lb 9.4 oz)   07/03/24 84.6 kg (186 lb 8.2 oz)       Physical Exam  Vitals and nursing note reviewed.   Constitutional:       General: She is not in acute distress.     Appearance: She is not ill-appearing.   HENT:      Head: Normocephalic and atraumatic.      Right Ear: External ear normal.      Left Ear: External ear normal.      Nose: Nose normal.      Mouth/Throat:      Mouth: Mucous membranes are moist.   Eyes:      Extraocular Movements: Extraocular movements intact.      Conjunctiva/sclera: Conjunctivae normal.   Cardiovascular:      Rate and Rhythm: Normal rate and regular rhythm.      Pulses: Normal pulses.      Heart sounds: No murmur " heard.  Pulmonary:      Effort: Pulmonary effort is normal. No respiratory distress.      Breath sounds: No wheezing.   Abdominal:      General: There is no distension.      Palpations: Abdomen is soft. There is no mass.      Tenderness: There is no abdominal tenderness.   Musculoskeletal:         General: No swelling.      Cervical back: Normal range of motion.   Skin:     Coloration: Skin is not jaundiced.      Findings: No rash.   Neurological:      General: No focal deficit present.      Mental Status: She is alert and oriented to person, place, and time.   Psychiatric:         Mood and Affect: Mood normal.         Thought Content: Thought content normal.         Labs:    Complete Blood Count  Lab Results   Component Value Date    RBC 4.36 03/25/2025    HGB 13.3 03/25/2025    HCT 39.2 03/25/2025    MCV 90 03/25/2025    MCH 30.5 03/25/2025    MCHC 33.9 03/25/2025    RDW 12.7 03/25/2025     03/25/2025    MPV 11.4 03/25/2025    GRAN 3.6 05/03/2024    GRAN 51.8 05/03/2024    LYMPH 41.9 03/25/2025    LYMPH 2.22 03/25/2025    MONO 6.2 03/25/2025    MONO 0.33 03/25/2025    EOS 0.9 03/25/2025    EOS 0.05 03/25/2025    BASO 0.05 05/03/2024    EOSINOPHIL 1.1 05/03/2024    BASOPHIL 0.6 03/25/2025    BASOPHIL 0.03 03/25/2025    DIFFMETHOD Automated 05/03/2024       Comprehensive Metabolic Panel  Lab Results   Component Value Date    BUN 8 03/25/2025    CREATININE 0.7 03/25/2025     03/25/2025    K 4.1 03/25/2025     03/25/2025    ALBUMIN 4.6 03/25/2025    BILITOT 1.1 (H) 03/25/2025    AST 28 03/25/2025    ALKPHOS 77 03/25/2025    CO2 24 03/25/2025    ALT 19 03/25/2025    ANIONGAP 12 03/25/2025       TSH  Lab Results   Component Value Date    TSH 0.560 03/25/2025       Imaging:  Mammo Digital Screening Bilat w/ [facility]  Narrative: Result:  Mammo Digital Screening Bilat w/ [facility]    History:  Patient is 53 y.o. and is seen for a screening mammogram.    Films Compared:  Compared to: 04/10/2023 Mammo Digital  Screening Bilat w/ [facility], 04/07/2022   Mammo Digital Screening Bilat w/ [facility], and 03/01/2021 Mammo Digital   Screening Bilat w/ [facility]     Findings:  This procedure was performed using tomosynthesis.   Computer-aided detection was utilized in the interpretation of this   examination.    The breasts are heterogeneously dense, which may obscure small masses.   There is no evidence of suspicious masses, microcalcifications or   architectural distortion.  Impression:    No mammographic evidence of malignancy.    BI-RADS Category 1: Negative    Recommendation:  Routine screening mammogram in 1 year is recommended.    Your estimated lifetime risk of breast cancer (to age 85) based on   Tyrer-Cuzick risk assessment model is 19.12%.  According to the [employer]   [employer] Society, patients with a lifetime breast cancer risk of 20% or   higher might benefit from supplemental screening tests, such as screening   breast MRI.      Assessment/Plan:    1. Skin tag  -     Destruction, Benign Lesion    2. Encounter for screening mammogram for breast cancer  -     Mammo Digital Screening Bilat w/ [facility] (XPD); Future; Expected date: 04/07/2026         Bharat Mcpherson MD           [1]   Current Outpatient Medications on File Prior to Visit   Medication Sig Dispense Refill    levothyroxine (SYNTHROID) 88 MCG tablet Take 1 tablet (88 mcg total) by mouth once daily. 90 tablet 3    losartan (COZAAR) 50 MG tablet Take 1 tablet (50 mg total) by mouth once daily. 90 tablet 3    medroxyPROGESTERone (DEPO-PROVERA) 150 mg/mL Syrg INJECT 1ML INTRAMUSCULARLY EVERY 90 DAYS. 1 mL 3    naproxen (NAPROSYN) 500 MG tablet TAKE 1 TABLET BY MOUTH TWICE A DAY (Patient not taking: Reported on 3/19/2025) 60 tablet 1    [DISCONTINUED] cyclobenzaprine (FLEXERIL) 5 MG tablet Take 1-2 tablets (5-10 mg total) by mouth 3 (three) times daily as needed for Muscle spasms. (Patient not taking: Reported on 4/7/2025) 180 tablet 0     No current facility-administered medications on  file prior to visit.

## 2025-05-06 ENCOUNTER — HOSPITAL ENCOUNTER (OUTPATIENT)
Dept: RADIOLOGY | Facility: HOSPITAL | Age: 55
Discharge: HOME | End: 2025-05-06
Attending: NURSE PRACTITIONER
Payer: COMMERCIAL

## 2025-05-06 VITALS — BODY MASS INDEX: 29.25 KG/M2 | WEIGHT: 182 LBS | HEIGHT: 66 IN

## 2025-05-06 DIAGNOSIS — Z12.31 ENCOUNTER FOR SCREENING MAMMOGRAM FOR BREAST CANCER: ICD-10-CM

## 2025-05-06 PROCEDURE — 77067 SCR MAMMO BI INCL CAD: CPT | Mod: 26,,, | Performed by: RADIOLOGY

## 2025-05-06 PROCEDURE — 77063 BREAST TOMOSYNTHESIS BI: CPT | Mod: 26,,, | Performed by: RADIOLOGY

## 2025-05-06 PROCEDURE — 77063 BREAST TOMOSYNTHESIS BI: CPT | Mod: TC,PN

## 2025-05-07 ENCOUNTER — RESULTS FOLLOW-UP (OUTPATIENT)
Dept: OBSTETRICS AND GYNECOLOGY | Facility: CLINIC | Age: 55
End: 2025-05-07

## 2025-05-29 DIAGNOSIS — I51.7 LVH (LEFT VENTRICULAR HYPERTROPHY): ICD-10-CM

## 2025-05-29 DIAGNOSIS — I10 ESSENTIAL HYPERTENSION: ICD-10-CM

## 2025-05-29 RX ORDER — LOSARTAN POTASSIUM 50 MG/1
50 TABLET ORAL
Qty: 90 TABLET | Refills: 3 | Status: SHIPPED | OUTPATIENT
Start: 2025-05-29

## 2025-05-29 NOTE — TELEPHONE ENCOUNTER
No care due was identified.  [facility] Embedded Care Due Messages. Reference number: [id number].   5/29/2025 12:03:29 AM CDT

## 2025-05-29 NOTE — TELEPHONE ENCOUNTER
Refill Decision Note   Anna Oakley  is requesting a refill authorization.  Brief Assessment and Rationale for Refill:  Approve     Medication Therapy Plan:         Comments:     Note composed:4:48 AM 05/29/2025

## 2025-06-03 ENCOUNTER — LAB VISIT (OUTPATIENT)
Dept: LAB | Facility: HOSPITAL | Age: 55
End: 2025-06-03
Attending: NURSE PRACTITIONER
Payer: COMMERCIAL

## 2025-06-03 ENCOUNTER — OFFICE VISIT (OUTPATIENT)
Dept: OBSTETRICS AND GYNECOLOGY | Facility: CLINIC | Age: 55
End: 2025-06-03
Payer: COMMERCIAL

## 2025-06-03 VITALS
WEIGHT: 187.38 LBS | SYSTOLIC BLOOD PRESSURE: 114 MMHG | DIASTOLIC BLOOD PRESSURE: 74 MMHG | BODY MASS INDEX: 30.11 KG/M2 | HEIGHT: 66 IN

## 2025-06-03 DIAGNOSIS — Z12.4 ENCOUNTER FOR PAPANICOLAOU SMEAR FOR CERVICAL CANCER SCREENING: ICD-10-CM

## 2025-06-03 DIAGNOSIS — N91.5 OLIGOMENORRHEA, UNSPECIFIED TYPE: ICD-10-CM

## 2025-06-03 DIAGNOSIS — Z01.419 WOMEN'S ANNUAL ROUTINE GYNECOLOGICAL EXAMINATION: Primary | ICD-10-CM

## 2025-06-03 DIAGNOSIS — Z11.51 SCREENING FOR HPV (HUMAN PAPILLOMAVIRUS): ICD-10-CM

## 2025-06-03 LAB — FSH SERPL-ACNC: 9.92 MIU/ML

## 2025-06-03 PROCEDURE — 99999 PR PBB SHADOW E&M-EST. PATIENT-LVL III: CPT | Mod: PBBFAC,,, | Performed by: NURSE PRACTITIONER

## 2025-06-03 PROCEDURE — 83001 ASSAY OF GONADOTROPIN (FSH): CPT

## 2025-06-03 PROCEDURE — 36415 COLL VENOUS BLD VENIPUNCTURE: CPT | Mod: PN

## 2025-06-03 PROCEDURE — 88175 CYTOPATH C/V AUTO FLUID REDO: CPT | Mod: TC | Performed by: NURSE PRACTITIONER

## 2025-06-04 ENCOUNTER — PATIENT MESSAGE (OUTPATIENT)
Dept: OBSTETRICS AND GYNECOLOGY | Facility: CLINIC | Age: 55
End: 2025-06-04
Payer: COMMERCIAL

## 2025-06-04 DIAGNOSIS — N91.5 OLIGOMENORRHEA, UNSPECIFIED TYPE: Primary | ICD-10-CM

## 2025-06-06 LAB
INSULIN SERPL-ACNC: NORMAL U[IU]/ML
LAB AP BETHESDA CATEGORY: NORMAL
LAB AP CLINICAL FINDINGS: NORMAL
LAB AP CONTRACEPTIVES: NORMAL
LAB AP OCHS PAP SPECIMEN ADEQUACY: NORMAL
LAB AP OHS PAP INTERPRETATION: NORMAL
LAB AP PAP DISCLAIMER COMMENTS: NORMAL
LAB AP PAP ESTROGEN REPLACEMENT THERAPY: NORMAL
LAB AP PAP PMP: NORMAL
LAB AP PAP PREVIOUS BX: NORMAL
LAB AP PAP PRIOR TREATMENT: NORMAL
LAB AP PERFORMING LOCATION(S): NORMAL

## 2025-06-09 ENCOUNTER — RESULTS FOLLOW-UP (OUTPATIENT)
Dept: OBSTETRICS AND GYNECOLOGY | Facility: CLINIC | Age: 55
End: 2025-06-09

## 2025-06-10 ENCOUNTER — PATIENT MESSAGE (OUTPATIENT)
Dept: OBSTETRICS AND GYNECOLOGY | Facility: CLINIC | Age: 55
End: 2025-06-10
Payer: COMMERCIAL

## (undated) DEVICE — DRESSING LEUKOPLAST FLEX 1X3IN